# Patient Record
Sex: MALE | Race: WHITE | NOT HISPANIC OR LATINO | Employment: OTHER | URBAN - NONMETROPOLITAN AREA
[De-identification: names, ages, dates, MRNs, and addresses within clinical notes are randomized per-mention and may not be internally consistent; named-entity substitution may affect disease eponyms.]

---

## 2017-02-06 ENCOUNTER — COMMUNICATION - GICH (OUTPATIENT)
Dept: FAMILY MEDICINE | Facility: OTHER | Age: 67
End: 2017-02-06

## 2017-02-06 DIAGNOSIS — R32 URINARY INCONTINENCE: ICD-10-CM

## 2017-02-06 DIAGNOSIS — I10 ESSENTIAL (PRIMARY) HYPERTENSION: ICD-10-CM

## 2017-02-20 ENCOUNTER — AMBULATORY - GICH (OUTPATIENT)
Dept: LAB | Facility: OTHER | Age: 67
End: 2017-02-20

## 2017-02-20 ENCOUNTER — HISTORY (OUTPATIENT)
Dept: FAMILY MEDICINE | Facility: OTHER | Age: 67
End: 2017-02-20

## 2017-02-20 ENCOUNTER — OFFICE VISIT - GICH (OUTPATIENT)
Dept: FAMILY MEDICINE | Facility: OTHER | Age: 67
End: 2017-02-20

## 2017-02-20 DIAGNOSIS — R32 URINARY INCONTINENCE: ICD-10-CM

## 2017-02-20 DIAGNOSIS — A09 INFECTIOUS GASTROENTERITIS AND COLITIS: ICD-10-CM

## 2017-02-20 DIAGNOSIS — I10 ESSENTIAL (PRIMARY) HYPERTENSION: ICD-10-CM

## 2017-02-21 LAB
CAMPYLOBACTER EIA - HISTORICAL: NEGATIVE
SHIGA TOXIN 1 - HISTORICAL: NEGATIVE
SHIGA TOXIN 2 - HISTORICAL: NEGATIVE

## 2017-02-22 LAB — CULTURE - HISTORICAL: NORMAL

## 2017-05-20 ENCOUNTER — OFFICE VISIT - GICH (OUTPATIENT)
Dept: FAMILY MEDICINE | Facility: OTHER | Age: 67
End: 2017-05-20

## 2017-05-20 ENCOUNTER — HISTORY (OUTPATIENT)
Dept: FAMILY MEDICINE | Facility: OTHER | Age: 67
End: 2017-05-20

## 2017-05-20 DIAGNOSIS — B97.89 OTHER VIRAL AGENTS AS THE CAUSE OF DISEASES CLASSIFIED ELSEWHERE: ICD-10-CM

## 2017-05-20 DIAGNOSIS — J06.9 ACUTE UPPER RESPIRATORY INFECTION: ICD-10-CM

## 2017-05-20 DIAGNOSIS — J02.9 ACUTE PHARYNGITIS: ICD-10-CM

## 2017-05-20 LAB — STREP A ANTIGEN - HISTORICAL: NEGATIVE

## 2017-08-29 ENCOUNTER — HISTORY (OUTPATIENT)
Dept: EMERGENCY MEDICINE | Facility: OTHER | Age: 67
End: 2017-08-29

## 2017-08-29 ENCOUNTER — COMMUNICATION - GICH (OUTPATIENT)
Dept: FAMILY MEDICINE | Facility: OTHER | Age: 67
End: 2017-08-29

## 2017-09-07 ENCOUNTER — OFFICE VISIT - GICH (OUTPATIENT)
Dept: FAMILY MEDICINE | Facility: OTHER | Age: 67
End: 2017-09-07

## 2017-09-07 ENCOUNTER — HISTORY (OUTPATIENT)
Dept: FAMILY MEDICINE | Facility: OTHER | Age: 67
End: 2017-09-07

## 2017-09-07 DIAGNOSIS — R42 DIZZINESS AND GIDDINESS: ICD-10-CM

## 2017-09-07 ASSESSMENT — PATIENT HEALTH QUESTIONNAIRE - PHQ9: SUM OF ALL RESPONSES TO PHQ QUESTIONS 1-9: 0

## 2017-11-08 ENCOUNTER — AMBULATORY - GICH (OUTPATIENT)
Dept: FAMILY MEDICINE | Facility: OTHER | Age: 67
End: 2017-11-08

## 2017-11-08 DIAGNOSIS — Z23 ENCOUNTER FOR IMMUNIZATION: ICD-10-CM

## 2017-12-27 NOTE — PROGRESS NOTES
Patient Information     Patient Name MRN Sex     Jarad Purvis [id number] Male 1950      Progress Notes by Neal Enriquez MD at 2017 10:15 AM     Author:  Neal Enriquez MD Service:  (none) Author Type:  Physician     Filed:  2017  1:26 PM Encounter Date:  2017 Status:  Signed     :  Neal Enriquez MD (Physician)            SUBJECTIVE:    Jarad Purvis is a 67 y.o. male who presents for follow-up dizziness and burping    HPI Comments: Patient arrives here for follow-up dizziness and burping. He also been having gas E chest pain. States when he gets fairly humid he has shortness of breath but that only occurs with exercise during high humidity days. Otherwise he has no other complaints. He is doing fairly well. Recently seen in the ER and he had negative troponins. Dizziness was unclear. He's had no further episodes of the dizziness. No history of palpitations. No feelings of passing out. Just a lightheadedness.      Allergies     Allergen  Reactions     Phenobarbital Hives   ,   Family History       Problem   Relation Age of Onset     Cancer-breast  Mother      Heart Disease  Father 74     MI       Hypertension  Brother      Arthritis  Brother      Rheumatoid        Diabetes  No Family History     and   Current Outpatient Prescriptions on File Prior to Visit       Medication  Sig Dispense Refill     CPAP CPAP, heated humidifier, mask, headgear, filters and tubing. For home use. Pressure: ?  Length of Need: lifetime 1 unit 0     hydroCHLOROthiazide (HCTZ) 25 mg tablet Take 1 tablet by mouth once daily. 90 tablet 3     multivitamins with minerals (OCUVITE; OPTIGEN) tablet Take 1 tablet by mouth once daily.       POTASSIUM ORAL Take  by mouth.       propranolol (INDERAL) 40 mg tablet Take 1 tablet by mouth 2 times daily. 180 tablet 3     tamsulosin (FLOMAX) 0.4 mg capsule Take 1 capsule by mouth once daily after a meal. 90 capsule 3     No current facility-administered medications  on file prior to visit.        REVIEW OF SYSTEMS:  ROS    OBJECTIVE:  /84  Pulse 56  Wt 120.2 kg (265 lb)  BMI 35.94 kg/m2    EXAM:   Physical Exam   Constitutional: He is well-developed, well-nourished, and in no distress.   Obese   HENT:   Head: Normocephalic.   Right Ear: External ear normal.   Left Ear: External ear normal.   Mouth/Throat: No oropharyngeal exudate.   Eyes: Pupils are equal, round, and reactive to light.   Cardiovascular: Normal rate, regular rhythm and normal heart sounds.    No murmur heard.  Pulmonary/Chest: Effort normal and breath sounds normal. No respiratory distress.   Abdominal: Soft.   Musculoskeletal: Normal range of motion.   Neurological: He is alert.   Skin: Skin is warm.       ASSESSMENT/PLAN:    ICD-10-CM    1. Dizziness R42         Plan:  1 episode. Negative troponins. Not consistent with cardiac in nature. Recommended observation for now. If symptoms should worsen would consider cardiac echo.

## 2017-12-28 NOTE — TELEPHONE ENCOUNTER
"Patient Information     Patient Name MRN Jarad Barriga [id number] Male 1950      Telephone Encounter by Crystal Johnson RN at 2017  1:01 PM     Author:  Crystal Johnson RN Service:  (none) Author Type:  NURS- Registered Nurse     Filed:  2017  1:19 PM Encounter Date:  2017 Status:  Signed     :  Crystal Johnson RN (NURS- Registered Nurse)            Patient presented to [facility] with complaints of dizziness, shortness of breath and \"something with my arm.\" Patient was brought back for triage by this writer. He shares that these symptoms were accompanied by left-sided numbness/weakness, and blurred vision. Symptoms occurred when he was down for a nap after lunch. He waited for the symptoms to subside and then came to the clinic.  Due to complaints of one-sided weakness and numbness that have not totally resolved, patient was taken to the ED via wheelchair by this writer and clinic LPN.    Reason for Disposition    [1] Numbness (i.e., loss of sensation) of the face, arm / hand, or leg / foot on one side of the body AND [2] sudden onset AND [3] brief (now gone)    Answer Assessment - Initial Assessment Questions  1. SYMPTOM: \"What is the main symptom you are concerned about?\" (e.g., weakness, numbness)      Left-sided weakness/numbess  2. ONSET: \"When did this start?\" (minutes, hours, days; while sleeping)      While sleeping, 1-2 hours ago  3. LAST NORMAL: \"When was the last time you were normal (no symptoms)?\"      Has been sweating a lot the past few days, but otherwise normal unitl this episode  4. PATTERN \"Does this come and go, or has it been constant since it started?\"  \"Is it present now?\"      Has only happened once, mostly resolved at this time  5. CARDIAC SYMPTOMS: \"Have you had any of the following symptoms: chest pain, difficulty breathing, palpitations?\"      Difficulty breathing  6. NEUROLOGIC SYMPTOMS: \"Have you had any of the following symptoms: " "headache, dizziness, vision loss, double vision, changes in speech, unsteady on your feet?\"      Dizzy, blurred vision  7. OTHER SYMPTOMS: \"Do you have any other symptoms?\"      no  8. PREGNANCY: \"Is there any chance you are pregnant?\" \"When was your last menstrual period?\"      N/a male    Protocols used: ADULT NEUROLOGIC DEFICIT-A-AH            "

## 2017-12-30 NOTE — NURSING NOTE
Patient Information     Patient Name MRN Sex Jarad Love [id number] Male 1950      Nursing Note by Devi Vivar at 2017 10:15 AM     Author:  Devi Vivar Service:  (none) Author Type:  (none)     Filed:  2017 10:33 AM Encounter Date:  2017 Status:  Signed     :  Devi Vivar            Patient here for ED follow up for heart. He is feeling SOB with exercise and noticing belching and gassy. Devi Vivar LPN .......................2017  10:29 AM

## 2018-01-03 NOTE — PROGRESS NOTES
Patient Information     Patient Name MRN Sex Jarad Love [id number] Male 1950      Progress Notes by Anahy Contreras at 2017  2:55 PM     Author:  Anahy Contreras Service:  (none) Author Type:  (none)     Filed:  2017  3:24 PM Encounter Date:  2017 Status:  Signed     :  Anahy Contreras            Patient called back and was notified of the below information.  Anahy Contreras LPN  2017  3:24 PM

## 2018-01-03 NOTE — TELEPHONE ENCOUNTER
Patient Information     Patient Name MRN Sex Jarad Love [id number] Male 1950      Telephone Encounter by Crystal Johnson RN at 2017  2:31 PM     Author:  Crystal Johnson RN Service:  (none) Author Type:  NURS- Registered Nurse     Filed:  2017  2:35 PM Encounter Date:  2017 Status:  Signed     :  Crystal Johnson RN (NURS- Registered Nurse)            [facility]    Office visit in the past 12 months or per provider note.    Last visit with HARRY GALLARDO was on: 2016 in [facility]  Next visit with PAULINA HARRY SPARKS is on: 2017 in [facility]  Next visit with [facility] is on: 2017 in [facility]    Max refill for 12 months from last office visit or per provider note.    Diuretics (may be prescribed for edema)    Office visit in the past 12 months or per provider note.    Lab testing requirements:  Creatinine and Potassium annually, if ordering lab, order BMP.  CREATININE (mg/dL)    Date Value   2016 0.93     POTASSIUM (mmol/L)    Date Value   2016 3.8     BP Readings from Last 4 Encounters:    16 115/80   16 118/78   16 124/68   16 120/80         Review last provider visit note.  If BP reviewed and plan is noted, can refill.  Max refill for 12 months from last office visit or per provider note.    Prophylactic Therapy for Headache/Migraine    Office visit in the past 12 months or per provider note.    Max refill for 12 months from last office visit or per provider note.    Patient is due for medication management appointment. Limited refill provided at this time and noted upcoming appointment. Prescription refilled per RN Medication Refill Policy.................... Crystal Johnson RN ....................  2017   2:32 PM

## 2018-01-03 NOTE — NURSING NOTE
Patient Information     Patient Name MRN Sex Jarad Love [id number] Male 1950      Nursing Note by Devi Vivar at 2017  9:00 AM     Author:  Devi Vivar Service:  (none) Author Type:  (none)     Filed:  2017  9:07 AM Encounter Date:  2017 Status:  Signed     :  Devi Vivar            Patient here for refills on his medications, he returned [place] on Saturday and has a stomach upset going on, loose stools and yellow liquid. Devi Vivar LPN .......................2017  9:07 AM

## 2018-01-03 NOTE — PROGRESS NOTES
Patient Information     Patient Name MRN Sex     Jarad Purvis [id number] Male 1950      Progress Notes by Neal Enriquez MD at 2017  9:00 AM     Author:  Neal Enriquez MD Service:  (none) Author Type:  Physician     Filed:  2017 10:01 AM Encounter Date:  2017 Status:  Signed     :  Neal Enriquez MD (Physician)            SUBJECTIVE:    Jarad Purvis is a 66 y.o. male who presents for meducation refil loose stools. An abdominal cramping    HPI Comments: Patient arrives here for medication refill. He is in need of Flomax and his blood pressure medication. He is tolerating it well. He also reports cramping. He and his wife recently completed a cruise down in [place]. He developed 3-4 days of red to yellow diarrhea stools. Increasing bloating increasing gas. Denies any antibiotic use. Sounds like he had does have a history of C. difficile. He reports also cramping at times. No nausea or vomiting.      Allergies     Allergen  Reactions     Phenobarbital Hives   ,   Family History       Problem   Relation Age of Onset     Heart Disease  Father 74     MI       Cancer-breast  Mother      Hypertension  Brother      Arthritis  Brother      Rheumatoid        Diabetes  No Family History    ,   Current Outpatient Prescriptions on File Prior to Visit       Medication  Sig Dispense Refill     CPAP CPAP, heated humidifier, mask, headgear, filters and tubing. For home use. Pressure: ?  Length of Need: lifetime 1 unit 0     docusate (COLACE) 100 mg capsule Take 100 mg by mouth once daily.       multivitamins with minerals (OCUVITE; OPTIGEN) tablet Take 1 tablet by mouth once daily.       POTASSIUM ORAL Take  by mouth.       No current facility-administered medications on file prior to visit.    ,   Past Surgical History       Procedure   Laterality Date     Pr lap ventral incisional herniorraphy        with mesh       Pr fusion of wrist joint        Nissen fundoplication         Cholecystectomy        Cataract removal        rt       Esophagogastroduodenoscopy        1998       Bone marrow aspiration        Colonoscopy screening        2010     ,   Social History       Substance Use Topics         Smoking status:  Former Smoker      Quit date: 10/8/1985      Smokeless tobacco:  Former User      Alcohol use  4.2 oz/week     7 Standard drinks or equivalent per week     and   Social History       Substance Use Topics         Smoking status:  Former Smoker      Quit date: 10/8/1985      Smokeless tobacco:  Former User      Alcohol use  4.2 oz/week     7 Standard drinks or equivalent per week        REVIEW OF SYSTEMS:  ROS    OBJECTIVE:  /78  Pulse 60  Wt 122.1 kg (269 lb 3.2 oz)  BMI 36.51 kg/m2    EXAM:   Physical Exam   Constitutional: He is well-developed, well-nourished, and in no distress.   HENT:   Head: Normocephalic and atraumatic.   Right Ear: External ear normal.   Left Ear: External ear normal.   Eyes: Pupils are equal, round, and reactive to light.   Cardiovascular: Normal rate, regular rhythm and normal heart sounds.    No murmur heard.  Pulmonary/Chest: Effort normal and breath sounds normal. No respiratory distress.   Abdominal: Soft. He exhibits no distension and no mass. There is no tenderness. There is no rebound and no guarding.   Increased bowel sounds   Musculoskeletal: Normal range of motion.   Neurological: He is alert.   Skin: Skin is warm.       ASSESSMENT/PLAN:    ICD-10-CM    1. Essential hypertension I10 hydroCHLOROthiazide (HCTZ) 25 mg tablet      propranolol (INDERAL) 40 mg tablet      tamsulosin (FLOMAX) 0.4 mg capsule   2. Incontinence R32 tamsulosin (FLOMAX) 0.4 mg capsule   3. Diarrhea of presumed infectious origin A09 STOOL CULTURE PANEL      CLOSTRIDIUM DIFFICILE TOXIN PCR        Plan:  Blood pressure currently under good control.  With his recent cruise one worries about infectious diarrhea. We'll proceed with a stool culture and C. difficile. Flomax  is refilled for urinary incontinence. Also reports that's working well. Discussed with the patient if he should worsen especially developing fevers and chills he needs to return for reevaluation.

## 2018-01-05 NOTE — PATIENT INSTRUCTIONS
Patient Information     Patient Name MRN Sex Jarad Love [id number] Male 1950      Patient Instructions by Stella Diaz NP at 2017  9:00 AM     Author:  Stella Diaz NP  Service:  (none) Author Type:  PHYS- Nurse Practitioner     Filed:  2017  9:54 AM  Encounter Date:  2017 Status:  Addendum     :  Stella Diaz NP (PHYS- Nurse Practitioner)        Related Notes: Original Note by Stella Diaz NP (PHYS- Nurse Practitioner) filed at 2017  9:53 AM            Symptoms likely due to virus. No antibiotic is needed at this time.       Most coughs are caused by a viral infection.   Usually coughs can last 2 to 3 weeks. Sometimes the cough becomes loose (wet) for a few days, and your child coughs up a lot of phlegm (mucus). This is usually a sign that the end of the illness is near.    Most sore throats are caused by viruses and are part of a cold. About 10% of sore throats are caused by strep bacteria.    May use Mucinex as needed for congestion    Encouraged fluids and rest.    May use symptomatic care with tylenol or ibuprofen.     Using a humidifier works well to break up the congestion.     Elevate the mattress to 15 degrees in order to help with the congestion.    Frequent swallows of cool liquid.      Oatmeal or honey coats the throat and some patients find it soothes the pain.     Salt water gargles as needed    Return to clinic with change/worsening of symptoms or concerns.

## 2018-01-05 NOTE — NURSING NOTE
Patient Information     Patient Name MRN Sex Jarad Love [id number] Male 1950      Nursing Note by Catie Jackson at 2017  9:00 AM     Author:  Catie Jackson Service:  (none) Author Type:  (none)     Filed:  2017  9:22 AM Encounter Date:  2017 Status:  Signed     :  Catie Jackson            Patient presents to clinic with cough and sore throat. He has had a productive cough of brownish/vasquez phylum.  Catie Baltazar ....................  2017   9:15 AM

## 2018-01-05 NOTE — PROGRESS NOTES
Patient Information     Patient Name MRN Sex     Jarad Purvis [id number] Male 1950      Progress Notes by Stella Diaz NP at 2017  9:00 AM     Author:  Stella Diaz NP Service:  (none) Author Type:  PHYS- Nurse Practitioner     Filed:  2017 10:10 AM Encounter Date:  2017 Status:  Signed     :  Stella Diaz NP (PHYS- Nurse Practitioner)            HPI:    Jarad Purvis is a 66 y.o. male who presents to clinic today for URI.  Sore throat and stuffy nose for a couple of days.  Throat is irritated and sore but not severe.  Intermittent cough started yesterday.  Coughing up phlegm during the night.  No chest tightness.  Feeling winded at times.  No fevers.  Feeling warm.  No chills.  Headache this morning.  Body aches and weakness initially, resolved.  Decreased energy, low, fatigued.  Appetite normal, no change.  Taking Dayquil and Nyquil for a few days.            No past medical history on file.  Past Surgical History:      Procedure  Laterality Date     BONE MARROW ASPIRATION       CATARACT REMOVAL      rt       CHOLECYSTECTOMY       COLONOSCOPY SCREENING             ESOPHAGOGASTRODUODENOSCOPY             nissen fundoplication       MA FUSION OF WRIST JOINT       MA LAP VENTRAL INCISIONAL HERNIORRAPHY      with mesh       Social History       Substance Use Topics         Smoking status:  Former Smoker      Quit date: 10/8/1985      Smokeless tobacco:  Former User      Alcohol use  4.2 oz/week     7 Standard drinks or equivalent per week      Current Outpatient Prescriptions       Medication  Sig Dispense Refill     CPAP CPAP, heated humidifier, mask, headgear, filters and tubing. For home use. Pressure: ?  Length of Need: lifetime 1 unit 0     docusate (COLACE) 100 mg capsule Take 100 mg by mouth once daily.       hydroCHLOROthiazide (HCTZ) 25 mg tablet Take 1 tablet by mouth once daily. 90 tablet 3     multivitamins with minerals (OCUVITE; OPTIGEN) tablet  Take 1 tablet by mouth once daily.       POTASSIUM ORAL Take  by mouth.       propranolol (INDERAL) 40 mg tablet Take 1 tablet by mouth 2 times daily. 180 tablet 3     tamsulosin (FLOMAX) 0.4 mg capsule Take 1 capsule by mouth once daily after a meal. 90 capsule 3     No current facility-administered medications for this visit.      Medications have been reviewed by me and are current to the best of my knowledge and ability.    Allergies     Allergen  Reactions     Phenobarbital Hives       Past medical history, past surgical history, current medications and allergies reviewed and accurate to the best of my knowledge.        ROS:  Refer to HPI    /78  Pulse 80  Temp 97.5  F (36.4  C) (Tympanic)   Resp 20  Ht 1.829 m (6')  Wt 123 kg (271 lb 3.2 oz)  BMI 36.78 kg/m2    EXAM:  General Appearance: Well appearing adult male, appropriate appearance for age. No acute distress  Head: normocephalic, atraumatic  Ears: Left TM with bony landmarks appreciated, no erythema, no effusion, no bulging, no purulence.  Right TM with bony landmarks appreciated, no erythema, no effusion, no bulging, no purulence.   Left auditory canal clear.  Right auditory canal clear.  Normal external ears, non tender.  Eyes: conjunctivae normal, no drainage  Orophayrnx: moist mucous membranes, posterior pharynx with bright erythema, tonsils with 1-2+ hypertrophy, bright erythema, no exudates or petechiae, no post nasal drip seen.    Nose:  Bilateral nares with erythema and mild congestion   Neck: supple without adenopathy  Respiratory: normal chest wall and respirations.  Normal effort.  Clear to auscultation bilaterally, no wheezing, crackles or rhonchi.  No increased work of breathing.  Occasional mild cough appreciated  Cardiac: RRR with no murmurs  Psychological: normal affect, alert and pleasant      Labs:  Results for orders placed or performed in visit on 05/20/17      RAPID STREP WITH REFLEX CULTURE      Result  Value Ref Range     STREP A ANTIGEN           Negative Negative           ASSESSMENT/PLAN:    ICD-10-CM    1. Sore throat J02.9 RAPID STREP WITH REFLEX CULTURE      RAPID STREP WITH REFLEX CULTURE   2. Viral pharyngitis J02.9    3. Viral URI with cough J06.9      B97.89          Negative rapid strep test  Symptoms and exam consistent with viral illness.  No antibiotics indicated at this time.  Mucinex PRN.  Avoid cold medications.    Encouraged fluids  Symptomatic treatment - salt water gargles, honey, elevation, humidifier, sinus rinse/netti pot, lozenges, etc   Tylenol or ibuprofen PRN  Follow up if symptoms persist or worsen or concerns        Patient Instructions   Symptoms likely due to virus. No antibiotic is needed at this time.       Most coughs are caused by a viral infection.   Usually coughs can last 2 to 3 weeks. Sometimes the cough becomes loose (wet) for a few days, and your child coughs up a lot of phlegm (mucus). This is usually a sign that the end of the illness is near.    Most sore throats are caused by viruses and are part of a cold. About 10% of sore throats are caused by strep bacteria.    May use Mucinex as needed for congestion    Encouraged fluids and rest.    May use symptomatic care with tylenol or ibuprofen.     Using a humidifier works well to break up the congestion.     Elevate the mattress to 15 degrees in order to help with the congestion.    Frequent swallows of cool liquid.      Oatmeal or honey coats the throat and some patients find it soothes the pain.     Salt water gargles as needed    Return to clinic with change/worsening of symptoms or concerns.

## 2018-01-25 ENCOUNTER — DOCUMENTATION ONLY (OUTPATIENT)
Dept: FAMILY MEDICINE | Facility: OTHER | Age: 68
End: 2018-01-25

## 2018-01-25 PROBLEM — I10 HYPERTENSION: Status: ACTIVE | Noted: 2018-01-25

## 2018-01-25 PROBLEM — D69.49 PRIMARY THROMBOCYTOPENIA (H): Status: ACTIVE | Noted: 2018-01-25

## 2018-01-25 PROBLEM — G47.30 SLEEP APNEA: Status: ACTIVE | Noted: 2018-01-25

## 2018-01-25 PROBLEM — G80.9 CEREBRAL PALSY (H): Status: ACTIVE | Noted: 2018-01-25

## 2018-01-25 PROBLEM — K44.9 HIATAL HERNIA: Status: ACTIVE | Noted: 2018-01-25

## 2018-01-25 PROBLEM — K57.30 DIVERTICULAR DISEASE OF COLON: Status: ACTIVE | Noted: 2018-01-25

## 2018-01-25 PROBLEM — R56.9 CONVULSIONS (H): Status: ACTIVE | Noted: 2018-01-25

## 2018-01-25 RX ORDER — HYDROCHLOROTHIAZIDE 25 MG/1
25 TABLET ORAL DAILY
COMMUNITY
Start: 2017-02-20 | End: 2018-03-19

## 2018-01-25 RX ORDER — PROPRANOLOL HYDROCHLORIDE 40 MG/1
40 TABLET ORAL 2 TIMES DAILY
COMMUNITY
Start: 2017-02-20 | End: 2018-03-19

## 2018-01-25 RX ORDER — TAMSULOSIN HYDROCHLORIDE 0.4 MG/1
0.4 CAPSULE ORAL DAILY
COMMUNITY
Start: 2017-02-20 | End: 2018-03-19

## 2018-01-25 RX ORDER — MULTIVIT-MIN/IRON FUM/FOLIC AC 7.5 MG-4
1 TABLET ORAL DAILY
COMMUNITY
Start: 2016-05-19

## 2018-01-27 VITALS
DIASTOLIC BLOOD PRESSURE: 78 MMHG | RESPIRATION RATE: 20 BRPM | WEIGHT: 271.2 LBS | SYSTOLIC BLOOD PRESSURE: 128 MMHG | HEART RATE: 80 BPM | HEIGHT: 72 IN | BODY MASS INDEX: 36.73 KG/M2 | TEMPERATURE: 97.5 F

## 2018-01-27 VITALS
SYSTOLIC BLOOD PRESSURE: 124 MMHG | DIASTOLIC BLOOD PRESSURE: 78 MMHG | WEIGHT: 269.2 LBS | HEART RATE: 60 BPM | BODY MASS INDEX: 36.51 KG/M2

## 2018-01-27 VITALS
DIASTOLIC BLOOD PRESSURE: 84 MMHG | HEART RATE: 56 BPM | BODY MASS INDEX: 35.94 KG/M2 | WEIGHT: 265 LBS | SYSTOLIC BLOOD PRESSURE: 122 MMHG

## 2018-02-02 ASSESSMENT — PATIENT HEALTH QUESTIONNAIRE - PHQ9: SUM OF ALL RESPONSES TO PHQ QUESTIONS 1-9: 0

## 2018-03-19 ENCOUNTER — OFFICE VISIT (OUTPATIENT)
Dept: FAMILY MEDICINE | Facility: OTHER | Age: 68
End: 2018-03-19
Attending: FAMILY MEDICINE
Payer: COMMERCIAL

## 2018-03-19 VITALS
WEIGHT: 262.6 LBS | SYSTOLIC BLOOD PRESSURE: 134 MMHG | BODY MASS INDEX: 35.61 KG/M2 | HEART RATE: 52 BPM | DIASTOLIC BLOOD PRESSURE: 82 MMHG

## 2018-03-19 DIAGNOSIS — I10 ESSENTIAL HYPERTENSION: Primary | ICD-10-CM

## 2018-03-19 DIAGNOSIS — R73.9 ELEVATED BLOOD SUGAR: ICD-10-CM

## 2018-03-19 DIAGNOSIS — N39.490 OVERFLOW INCONTINENCE OF URINE: ICD-10-CM

## 2018-03-19 PROCEDURE — 99213 OFFICE O/P EST LOW 20 MIN: CPT | Performed by: FAMILY MEDICINE

## 2018-03-19 PROCEDURE — G0463 HOSPITAL OUTPT CLINIC VISIT: HCPCS

## 2018-03-19 RX ORDER — HYDROCHLOROTHIAZIDE 25 MG/1
25 TABLET ORAL DAILY
Qty: 90 TABLET | Refills: 3 | Status: SHIPPED | OUTPATIENT
Start: 2018-03-19 | End: 2019-03-08

## 2018-03-19 RX ORDER — TAMSULOSIN HYDROCHLORIDE 0.4 MG/1
0.4 CAPSULE ORAL DAILY
Qty: 90 CAPSULE | Refills: 3 | Status: SHIPPED | OUTPATIENT
Start: 2018-03-19 | End: 2019-03-08

## 2018-03-19 RX ORDER — RIBOFLAVIN (VITAMIN B2) 100 MG
1 TABLET ORAL DAILY
COMMUNITY
End: 2022-04-12

## 2018-03-19 RX ORDER — PROPRANOLOL HYDROCHLORIDE 40 MG/1
40 TABLET ORAL 2 TIMES DAILY
Qty: 180 TABLET | Refills: 3 | Status: SHIPPED | OUTPATIENT
Start: 2018-03-19 | End: 2019-03-08

## 2018-03-19 ASSESSMENT — ANXIETY QUESTIONNAIRES
3. WORRYING TOO MUCH ABOUT DIFFERENT THINGS: NOT AT ALL
5. BEING SO RESTLESS THAT IT IS HARD TO SIT STILL: NOT AT ALL
6. BECOMING EASILY ANNOYED OR IRRITABLE: NOT AT ALL
2. NOT BEING ABLE TO STOP OR CONTROL WORRYING: NOT AT ALL
1. FEELING NERVOUS, ANXIOUS, OR ON EDGE: NOT AT ALL
7. FEELING AFRAID AS IF SOMETHING AWFUL MIGHT HAPPEN: NOT AT ALL
GAD7 TOTAL SCORE: 0

## 2018-03-19 ASSESSMENT — PAIN SCALES - GENERAL: PAINLEVEL: NO PAIN (0)

## 2018-03-19 ASSESSMENT — PATIENT HEALTH QUESTIONNAIRE - PHQ9: 5. POOR APPETITE OR OVEREATING: NOT AT ALL

## 2018-03-19 NOTE — NURSING NOTE
Patient here for medication refills and he has concerns with bloating, gas and belching. Devi Vivar LPN .......................3/19/2018  10:22 AM

## 2018-03-19 NOTE — MR AVS SNAPSHOT
"              After Visit Summary   3/19/2018    Jarad Purvis    MRN: [id number]           Patient Information     Date Of Birth          1950        Visit Information        Provider Department      3/19/2018 10:30 AM Neal Erniquez MD [facility]        Today's Diagnoses     Essential hypertension    -  1    Elevated blood sugar        Overflow incontinence of urine           Follow-ups after your visit        Who to contact     If you have questions or need follow up information about today's clinic visit or your schedule please contact [facility] directly at [phone].  Normal or non-critical lab and imaging results will be communicated to you by Timelinerhart, letter or phone within 4 business days after the clinic has received the results. If you do not hear from us within 7 days, please contact the clinic through [facility] or phone. If you have a critical or abnormal lab result, we will notify you by phone as soon as possible.  Submit refill requests through [employer] or call your pharmacy and they will forward the refill request to us. Please allow 3 business days for your refill to be completed.          Additional Information About Your Visit        MyChart Information     [employer] lets you send messages to your doctor, view your test results, renew your prescriptions, schedule appointments and more. To sign up, go to [email or web address] . Click on \"Log in\" on the left side of the screen, which will take you to the Welcome page. Then click on \"Sign up Now\" on the right side of the page.     You will be asked to enter the access code listed below, as well as some personal information. Please follow the directions to create your username and password.     Your access code is: [email or web address]  Expires: 2018 11:02 AM     Your access code will  in 90 days. If you need help or a new code, please call your [facility] clinic or [phone].        Care " EveryWhere ID     This is your Care EveryWhere ID. This could be used by other organizations to access your [facility] medical records  [id number]        Your Vitals Were     Pulse BMI (Body Mass Index)                52 35.61 kg/m2           Blood Pressure from Last 3 Encounters:   03/19/18 134/82   09/07/17 122/84   05/20/17 128/78    Weight from Last 3 Encounters:   03/19/18 262 lb 9.6 oz (119.1 kg)   09/07/17 265 lb (120.2 kg)   05/20/17 271 lb 3.2 oz (123 kg)              Today, you had the following     No orders found for display         Where to get your medicines      These medications were sent to [facility], - [place] - [address]  [address] [place] [address]     Phone:  [phone]     hydrochlorothiazide 25 MG tablet    propranolol 40 MG tablet    tamsulosin 0.4 MG capsule          Primary Care Provider Office Phone # Fax #    Neal Enriquez [phone]       [address] [facility] [address]        Equal Access to Services     [facility]: Hadii jasbir ku hadasho Soomaali, waaxda luqadaha, qaybta kaalmada adelatesha, luci avila . So [facility] [phone].    ATENCIÓN: Si habla español, tiene a tesfaye disposición servicios gratuitos de asistencia lingüística. [facility] [phone].    We comply with applicable federal civil rights laws and [place] laws. We do not discriminate on the basis of race, color, national origin, age, disability, sex, sexual orientation, or gender identity.            Thank you!     Thank you for choosing [facility] AND [facility]  for your care. Our goal is always to provide you with excellent care. Hearing back from our patients is one way we can continue to improve our services. Please take a few minutes to complete the written survey that you may receive in the mail after your visit with us. Thank you!             Your Updated Medication List - Protect others  around you: Learn how to safely use, store and throw away your medicines at www.disposemymeds.org.          This list is accurate as of 3/19/18 11:22 AM.  Always use your most recent med list.                   Brand Name Dispense Instructions for use Diagnosis    hydrochlorothiazide 25 MG tablet    HYDRODIURIL    90 tablet    Take 1 tablet (25 mg) by mouth daily    Essential hypertension       MULTI-VITAMIN/MINERALS Tabs      Take 1 tablet by mouth daily        potassium 99 MG Tabs           propranolol 40 MG tablet    INDERAL    180 tablet    Take 1 tablet (40 mg) by mouth 2 times daily    Essential hypertension       tamsulosin 0.4 MG capsule    FLOMAX    90 capsule    Take 1 capsule (0.4 mg) by mouth daily    Overflow incontinence of urine       Vitamin C 100 MG Tabs      Take 1 tablet by mouth daily

## 2018-03-19 NOTE — PROGRESS NOTES
SUBJECTIVE:   Jarad Purvis is a 67 year old male who presents to clinic today for the following health issues: Medication refill follow-up bloating    HPI Comments: Patient arrives here for medication refill.  He was last seen for belching and bloating.  He reports not any worse possibly a little bit improving.  He is attempting to adjust his diet.  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   Past Surgical History:   Procedure Laterality Date     CHOLECYSTECTOMY      No Comments Provided     COLONOSCOPY      2010     ESOPHAGOSCOPY, GASTROSCOPY, DUODENOSCOPY (EGD), COMBINED      1998     EXTRACAPSULAR CATARACT EXTRATION WITH INTRAOCULAR LENS IMPLANT      rt     OTHER SURGICAL HISTORY      24960.1,SD LAP VENTRAL INCISIONAL HERNIORRAPHY,with mesh     OTHER SURGICAL HISTORY      39865.0,SD FUSION OF WRIST JOINT     OTHER SURGICAL HISTORY      284821,OTHER     OTHER SURGICAL HISTORY      078720,BONE MARROW ASPIRATION        Review of Systems     OBJECTIVE:     /82 (BP Location: Left arm)  Pulse 52  Wt 262 lb 9.6 oz (119.1 kg)  BMI 35.61 kg/m2  Body mass index is 35.61 kg/(m^2).  Physical Exam   Constitutional: He appears well-developed.   Cardiovascular: Normal rate, regular rhythm and normal heart sounds.    No murmur heard.  Pulmonary/Chest: Effort normal and breath sounds normal.       none     ASSESSMENT/PLAN:         1. Elevated blood sugar  Patient recently had a chemistry panel which was satisfactory.    2. Essential hypertension  Currently under good control medications refilled.  - hydrochlorothiazide (HYDRODIURIL) 25 MG tablet; Take 1 tablet (25 mg) by mouth daily  Dispense: 90 tablet; Refill: 3  - propranolol (INDERAL) 40 MG tablet; Take 1 tablet (40 mg) by mouth 2 times daily  Dispense: 180 tablet; Refill: 3    3. Overflow incontinence of urine  Refill  - tamsulosin (FLOMAX) 0.4 MG capsule; Take 1 capsule (0.4 mg) by mouth daily  Dispense: 90 capsule; Refill: 3 also discussed his belching.    He can try simethicone or Gas-X.  Follow-up if worsening      Neal Enriquez MD  [facility] AND \[facility]\""

## 2018-03-20 ASSESSMENT — ANXIETY QUESTIONNAIRES: GAD7 TOTAL SCORE: 0

## 2018-03-20 ASSESSMENT — PATIENT HEALTH QUESTIONNAIRE - PHQ9: SUM OF ALL RESPONSES TO PHQ QUESTIONS 1-9: 0

## 2018-07-25 ENCOUNTER — TRANSFERRED RECORDS (OUTPATIENT)
Dept: HEALTH INFORMATION MANAGEMENT | Facility: OTHER | Age: 68
End: 2018-07-25

## 2018-10-09 ENCOUNTER — TRANSFERRED RECORDS (OUTPATIENT)
Dept: HEALTH INFORMATION MANAGEMENT | Facility: OTHER | Age: 68
End: 2018-10-09

## 2018-10-17 ENCOUNTER — ALLIED HEALTH/NURSE VISIT (OUTPATIENT)
Dept: FAMILY MEDICINE | Facility: OTHER | Age: 68
End: 2018-10-17
Attending: FAMILY MEDICINE
Payer: MEDICARE

## 2018-10-17 DIAGNOSIS — Z23 NEED FOR PROPHYLACTIC VACCINATION AND INOCULATION AGAINST INFLUENZA: Primary | ICD-10-CM

## 2018-10-17 PROCEDURE — 99207 ZZC NO CHARGE NURSE ONLY: CPT

## 2018-10-17 PROCEDURE — 90662 IIV NO PRSV INCREASED AG IM: CPT

## 2018-10-17 PROCEDURE — G0008 ADMIN INFLUENZA VIRUS VAC: HCPCS

## 2018-10-17 NOTE — MR AVS SNAPSHOT
After Visit Summary   10/17/2018    Jarad Purvis    MRN: [id number]           Patient Information     Date Of Birth          1950        Visit Information        Provider Department      10/17/2018 3:20 PM [facility] FLU [facility]        Today's Diagnoses     Need for prophylactic vaccination and inoculation against influenza    -  1       Follow-ups after your visit        Who to contact     If you have questions or need follow up information about today's clinic visit or your schedule please contact [facility] directly at [phone].  Normal or non-critical lab and imaging results will be communicated to you by MyChart, letter or phone within 4 business days after the clinic has received the results. If you do not hear from us within 7 days, please contact the clinic through MyChart or phone. If you have a critical or abnormal lab result, we will notify you by phone as soon as possible.  Submit refill requests through [employer] or call your pharmacy and they will forward the refill request to us. Please allow 3 business days for your refill to be completed.          Additional Information About Your Visit        Care EveryWhere ID     This is your Care EveryWhere ID. This could be used by other organizations to access your [place] medical records  [id number]         Blood Pressure from Last 3 Encounters:   03/19/18 134/82   09/07/17 122/84   05/20/17 128/78    Weight from Last 3 Encounters:   03/19/18 262 lb 9.6 oz (119.1 kg)   09/07/17 265 lb (120.2 kg)   05/20/17 271 lb 3.2 oz (123 kg)              We Performed the Following     HC FLU VACCINE, INCREASED ANTIGEN, PRESV FREE     Vaccine Administration, Initial [32670]        Primary Care Provider Office Phone # Fax #    Neal Enriquez [phone]       [address] [facility] [address]        Equal Access to Services     TULIO WISE AH: rashida Stuart  vincent osbornaishwaryamarilin maldonadoluci charlton. So [facility] [phone].    ATENCIÓN: Si sotero mike, tiene a tesfaye disposición servicios gratuitos de asistencia lingüística. Llame al [phone].    We comply with applicable federal civil rights laws and [place] laws. We do not discriminate on the basis of race, color, national origin, age, disability, sex, sexual orientation, or gender identity.            Thank you!     Thank you for choosing [facility] AND [facility]  for your care. Our goal is always to provide you with excellent care. Hearing back from our patients is one way we can continue to improve our services. Please take a few minutes to complete the written survey that you may receive in the mail after your visit with us. Thank you!             Your Updated Medication List - Protect others around you: Learn how to safely use, store and throw away your medicines at www.disposemymeds.org.          This list is accurate as of 10/17/18  3:31 PM.  Always use your most recent med list.                   Brand Name Dispense Instructions for use Diagnosis    hydrochlorothiazide 25 MG tablet    HYDRODIURIL    90 tablet    Take 1 tablet (25 mg) by mouth daily    Essential hypertension       MULTI-VITAMIN/MINERALS Tabs      Take 1 tablet by mouth daily        potassium 99 MG Tabs           propranolol 40 MG tablet    INDERAL    180 tablet    Take 1 tablet (40 mg) by mouth 2 times daily    Essential hypertension       tamsulosin 0.4 MG capsule    FLOMAX    90 capsule    Take 1 capsule (0.4 mg) by mouth daily    Overflow incontinence of urine       Vitamin C 100 MG Tabs      Take 1 tablet by mouth daily

## 2018-10-17 NOTE — PROGRESS NOTES

## 2019-02-26 ENCOUNTER — TELEPHONE (OUTPATIENT)
Dept: FAMILY MEDICINE | Facility: OTHER | Age: 69
End: 2019-02-26

## 2019-02-28 ENCOUNTER — TELEPHONE (OUTPATIENT)
Dept: FAMILY MEDICINE | Facility: OTHER | Age: 69
End: 2019-02-28

## 2019-02-28 NOTE — TELEPHONE ENCOUNTER
After verifying pts name and date of birth with pt, pt notified of Dr. Enriquez's message below. Pt was then transferred to the appointment line to make an appointment.  Joanna Alanis

## 2019-03-08 ENCOUNTER — OFFICE VISIT (OUTPATIENT)
Dept: FAMILY MEDICINE | Facility: OTHER | Age: 69
End: 2019-03-08
Attending: FAMILY MEDICINE
Payer: MEDICARE

## 2019-03-08 VITALS
HEART RATE: 64 BPM | WEIGHT: 265 LBS | DIASTOLIC BLOOD PRESSURE: 68 MMHG | BODY MASS INDEX: 35.89 KG/M2 | SYSTOLIC BLOOD PRESSURE: 116 MMHG | TEMPERATURE: 97.6 F | HEIGHT: 72 IN | RESPIRATION RATE: 20 BRPM

## 2019-03-08 DIAGNOSIS — G47.33 OBSTRUCTIVE SLEEP APNEA SYNDROME: ICD-10-CM

## 2019-03-08 DIAGNOSIS — I10 ESSENTIAL HYPERTENSION: Primary | ICD-10-CM

## 2019-03-08 DIAGNOSIS — N39.490 OVERFLOW INCONTINENCE OF URINE: ICD-10-CM

## 2019-03-08 LAB
ANION GAP SERPL CALCULATED.3IONS-SCNC: 9 MMOL/L (ref 3–14)
BUN SERPL-MCNC: 18 MG/DL (ref 7–25)
CALCIUM SERPL-MCNC: 9.5 MG/DL (ref 8.6–10.3)
CHLORIDE SERPL-SCNC: 102 MMOL/L (ref 98–107)
CO2 SERPL-SCNC: 25 MMOL/L (ref 21–31)
CREAT SERPL-MCNC: 0.81 MG/DL (ref 0.7–1.3)
GFR SERPL CREATININE-BSD FRML MDRD: >90 ML/MIN/{1.73_M2}
GLUCOSE SERPL-MCNC: 107 MG/DL (ref 70–105)
POTASSIUM SERPL-SCNC: 4 MMOL/L (ref 3.5–5.1)
SODIUM SERPL-SCNC: 136 MMOL/L (ref 134–144)

## 2019-03-08 PROCEDURE — 80048 BASIC METABOLIC PNL TOTAL CA: CPT | Performed by: FAMILY MEDICINE

## 2019-03-08 PROCEDURE — G0463 HOSPITAL OUTPT CLINIC VISIT: HCPCS

## 2019-03-08 PROCEDURE — 99214 OFFICE O/P EST MOD 30 MIN: CPT | Performed by: FAMILY MEDICINE

## 2019-03-08 PROCEDURE — 36415 COLL VENOUS BLD VENIPUNCTURE: CPT | Performed by: FAMILY MEDICINE

## 2019-03-08 RX ORDER — SODIUM FLUORIDE 6.1 MG/ML
GEL, DENTIFRICE DENTAL
Refills: 5 | COMMUNITY
Start: 2018-03-21 | End: 2019-05-20

## 2019-03-08 RX ORDER — PROPRANOLOL HYDROCHLORIDE 40 MG/1
40 TABLET ORAL 2 TIMES DAILY
Qty: 180 TABLET | Refills: 3 | Status: SHIPPED | OUTPATIENT
Start: 2019-03-08 | End: 2020-03-30

## 2019-03-08 RX ORDER — TAMSULOSIN HYDROCHLORIDE 0.4 MG/1
0.4 CAPSULE ORAL DAILY
Qty: 90 CAPSULE | Refills: 3 | Status: SHIPPED | OUTPATIENT
Start: 2019-03-08 | End: 2020-03-30

## 2019-03-08 RX ORDER — HYDROCHLOROTHIAZIDE 25 MG/1
25 TABLET ORAL DAILY
Qty: 90 TABLET | Refills: 3 | Status: SHIPPED | OUTPATIENT
Start: 2019-03-08 | End: 2020-03-30

## 2019-03-08 ASSESSMENT — PATIENT HEALTH QUESTIONNAIRE - PHQ9: SUM OF ALL RESPONSES TO PHQ QUESTIONS 1-9: 0

## 2019-03-08 ASSESSMENT — MIFFLIN-ST. JEOR: SCORE: 2000.03

## 2019-03-08 ASSESSMENT — PAIN SCALES - GENERAL: PAINLEVEL: NO PAIN (0)

## 2019-03-08 NOTE — NURSING NOTE
Patient here for medication refills and would like a written script for CPAP supplies to try with Zackary Garcia. He was going through [employer] which he says was a nightmare, patent was extremely dissatisfied. Medication Reconciliation: complete.    Devi Vivar LPN  3/8/2019 10:39 AM

## 2019-03-08 NOTE — PROGRESS NOTES
SUBJECTIVE:   Jarad Purvis is a 68 year old male who presents to clinic today for the following health issues: Med refills cpap issues patient arrives here for medication renewal.      Patient also needs a prescription for his CPAP.  States is been going through [employer] with difficulty getting holding him.  I did receive a request and part of the criteria of refilling his CPAP was that he need to be seen within 6 months.  He continues to use his CPAP on a regular basis.  Gets extremely good relief.  He is looking to change his provider and go to [place].  Requesting a written prescription.  Otherwise i is in need of medications including Flomax.  His blood pressure medications.  States last time he got any supplies from his previous provider the mask was the wrong size.          Patient Active Problem List    Diagnosis Date Noted     Cerebral palsy (H) 01/25/2018     Priority: Medium     Overview:   right arm flexion contractures       Diverticular disease of colon 01/25/2018     Priority: Medium     Hiatal hernia 01/25/2018     Priority: Medium     Hypertension 01/25/2018     Priority: Medium     Primary thrombocytopenia (H) 01/25/2018     Priority: Medium     Convulsions (H) 01/25/2018     Priority: Medium     Overview:   last seizure age 14  no longer on medications       Sleep apnea 01/25/2018     Priority: Medium     Elevated blood sugar 09/03/2015     Priority: Medium     Hemorrhoids 01/16/2015     Priority: Medium     Incontinence 09/30/2013     Priority: Medium     H/O colonoscopy 10/08/2012     Priority: Medium     Overview:   2008       Corns and callosities 12/12/2011     Priority: Medium     Personal history of other mental disorder 06/03/2010     Priority: Medium     History reviewed. No pertinent past medical history.   Past Surgical History:   Procedure Laterality Date     CHOLECYSTECTOMY      No Comments Provided     COLONOSCOPY  06/01/2010     ESOPHAGOSCOPY, GASTROSCOPY, DUODENOSCOPY (EGD),  COMBINED      1998     EXTRACAPSULAR CATARACT EXTRATION WITH INTRAOCULAR LENS IMPLANT      rt     OTHER SURGICAL HISTORY      35731.1,CA LAP VENTRAL INCISIONAL HERNIORRAPHY,with mesh     OTHER SURGICAL HISTORY      15177.0,CA FUSION OF WRIST JOINT     OTHER SURGICAL HISTORY      432135,OTHER     OTHER SURGICAL HISTORY      366560,BONE MARROW ASPIRATION     Current Outpatient Medications   Medication Sig Dispense Refill     Ascorbic Acid (VITAMIN C) 100 MG TABS Take 1 tablet by mouth daily       hydrochlorothiazide (HYDRODIURIL) 25 MG tablet Take 1 tablet (25 mg) by mouth daily 90 tablet 3     Misc Natural Products (MENS PROSTATE HEALTH FORMULA PO) Take 1 capsule by mouth daily       Multiple Vitamins-Minerals (MULTI-VITAMIN/MINERALS) TABS Take 1 tablet by mouth daily       order for DME Equipment being ordered: CPAP and supplies 1 Units 0     potassium 99 MG TABS        PREVIDENT 5000 DRY MOUTH 1.1 % GEL topical gel AS DIRECTED by dentist.  5     propranolol (INDERAL) 40 MG tablet Take 1 tablet (40 mg) by mouth 2 times daily 180 tablet 3     Sharps Container (SHARPS-A-GATOR LOCKING BRACKET) MISC CPAP, heated humidifier, mask, headgear, filters and tubing. For home use. Pressure: ?  Length of Need: lifetime       tamsulosin (FLOMAX) 0.4 MG capsule Take 1 capsule (0.4 mg) by mouth daily 90 capsule 3     Allergies   Allergen Reactions     Phenobarbital Hives       Review of Systems     OBJECTIVE:     /68 (BP Location: Left arm, Patient Position: Sitting)   Pulse 64   Temp 97.6  F (36.4  C)   Resp 20   Ht 1.829 m (6')   Wt 120.2 kg (265 lb)   BMI 35.94 kg/m    Body mass index is 35.94 kg/m .  Physical Exam   Constitutional: He appears well-developed.   HENT:   Head: Normocephalic.   Eyes: Pupils are equal, round, and reactive to light.   Pulmonary/Chest: Effort normal.   Neurological: He is alert.   Skin: Skin is warm.       Diagnostic Test Results:  Results for orders placed or performed in visit on  03/08/19 (from the past 24 hour(s))   Basic Metabolic Panel   Result Value Ref Range    Sodium 136 134 - 144 mmol/L    Potassium 4.0 3.5 - 5.1 mmol/L    Chloride 102 98 - 107 mmol/L    Carbon Dioxide 25 21 - 31 mmol/L    Anion Gap 9 3 - 14 mmol/L    Glucose 107 (H) 70 - 105 mg/dL    Urea Nitrogen 18 7 - 25 mg/dL    Creatinine 0.81 0.70 - 1.30 mg/dL    GFR Estimate >90 >60 mL/min/[1.73_m2]    GFR Estimate If Black >90 >60 mL/min/[1.73_m2]    Calcium 9.5 8.6 - 10.3 mg/dL       ASSESSMENT/PLAN:         1. Overflow incontinence of urine  Refill continues to have symptoms without the medication.   - tamsulosin (FLOMAX) 0.4 MG capsule; Take 1 capsule (0.4 mg) by mouth daily  Dispense: 90 capsule; Refill: 3    2. Essential hypertension  Currently under good control.  His blood work is satisfactory.  - hydrochlorothiazide (HYDRODIURIL) 25 MG tablet; Take 1 tablet (25 mg) by mouth daily  Dispense: 90 tablet; Refill: 3  - propranolol (INDERAL) 40 MG tablet; Take 1 tablet (40 mg) by mouth 2 times daily  Dispense: 180 tablet; Refill: 3  - Basic Metabolic Panel; Future  - Basic Metabolic Panel    3. Obstructive sleep apnea syndrome  DME is filled out.  Patient medically continues to qualify for CPAP.  - order for DME; Equipment being ordered: CPAP and supplies  Dispense: 1 Units; Refill: 0  25-30 minutes spent with the patient reviewing medical records reviewing forms that have arrived previously.    Neal Enriquez MD  [facility]

## 2019-03-25 ENCOUNTER — OFFICE VISIT (OUTPATIENT)
Dept: FAMILY MEDICINE | Facility: OTHER | Age: 69
End: 2019-03-25
Attending: FAMILY MEDICINE
Payer: COMMERCIAL

## 2019-03-25 VITALS
HEART RATE: 68 BPM | WEIGHT: 266.2 LBS | HEIGHT: 72 IN | DIASTOLIC BLOOD PRESSURE: 88 MMHG | BODY MASS INDEX: 36.06 KG/M2 | RESPIRATION RATE: 20 BRPM | TEMPERATURE: 97.1 F | SYSTOLIC BLOOD PRESSURE: 136 MMHG

## 2019-03-25 DIAGNOSIS — J06.9 VIRAL UPPER RESPIRATORY TRACT INFECTION: ICD-10-CM

## 2019-03-25 DIAGNOSIS — G47.33 OBSTRUCTIVE SLEEP APNEA SYNDROME: Primary | ICD-10-CM

## 2019-03-25 PROCEDURE — 99213 OFFICE O/P EST LOW 20 MIN: CPT | Performed by: FAMILY MEDICINE

## 2019-03-25 PROCEDURE — G0463 HOSPITAL OUTPT CLINIC VISIT: HCPCS

## 2019-03-25 ASSESSMENT — PAIN SCALES - GENERAL: PAINLEVEL: NO PAIN (0)

## 2019-03-25 ASSESSMENT — MIFFLIN-ST. JEOR: SCORE: 2005.48

## 2019-03-25 ASSESSMENT — PATIENT HEALTH QUESTIONNAIRE - PHQ9: SUM OF ALL RESPONSES TO PHQ QUESTIONS 1-9: 0

## 2019-03-25 NOTE — NURSING NOTE
Patient here for sinus problems for the past 3-4 days. He says there is an odor to drainage that is green in color. He is having trouble getting his new CPAP machine and supplies and he feels that the infection is form this. Medication Reconciliation: complete.    Devi Vivar LPN  3/25/2019 8:58 AM

## 2019-03-25 NOTE — PROGRESS NOTES
SUBJECTIVE:   Jarad Purvis is a 68 year old male who presents to clinic today for the following health issues: Possible sinus infection    Patient arrives here for possible sinus infection.  He reports greenish discharge that is very smelly.  He reports is being green in order us.  He initially had a cold 1/2 weeks ago.  But over the last 2-3 days he has developed this discharge.  He is used NyQuil and DayQuil without any improvement.  He has been also having trouble getting CPAP.  He has been advised he may need to do a sleep study again.  He will try to get his sleep study done years ago.          Patient Active Problem List    Diagnosis Date Noted     Cerebral palsy (H) 01/25/2018     Priority: Medium     Overview:   right arm flexion contractures       Diverticular disease of colon 01/25/2018     Priority: Medium     Hiatal hernia 01/25/2018     Priority: Medium     Hypertension 01/25/2018     Priority: Medium     Primary thrombocytopenia (H) 01/25/2018     Priority: Medium     Convulsions (H) 01/25/2018     Priority: Medium     Overview:   last seizure age 14  no longer on medications       Sleep apnea 01/25/2018     Priority: Medium     Elevated blood sugar 09/03/2015     Priority: Medium     Hemorrhoids 01/16/2015     Priority: Medium     Incontinence 09/30/2013     Priority: Medium     H/O colonoscopy 10/08/2012     Priority: Medium     Overview:   2008       Corns and callosities 12/12/2011     Priority: Medium     Personal history of other mental disorder 06/03/2010     Priority: Medium     History reviewed. No pertinent past medical history.     Review of Systems     OBJECTIVE:     /88 (BP Location: Left arm, Patient Position: Sitting)   Pulse 68   Temp 97.1  F (36.2  C)   Resp 20   Ht 1.829 m (6')   Wt 120.7 kg (266 lb 3.2 oz)   BMI 36.10 kg/m    Body mass index is 36.1 kg/m .  Physical Exam   Constitutional: He appears well-developed and well-nourished.   HENT:   Head: Normocephalic.    Eyes: Pupils are equal, round, and reactive to light.   Cardiovascular: Normal rate and regular rhythm.   Pulmonary/Chest: Effort normal and breath sounds normal.       none     ASSESSMENT/PLAN:         1. Obstructive sleep apnea syndrome  Sleep study if needed to continue his CPAP reassurance is given.      2. Viral upper respiratory tract infection  Trial of Flonase.  If no improvement call later in the week for for antibiotic therapy        Neal Enriquez MD  [facility] AND [facility]

## 2019-04-08 ENCOUNTER — MEDICAL CORRESPONDENCE (OUTPATIENT)
Dept: HEALTH INFORMATION MANAGEMENT | Facility: OTHER | Age: 69
End: 2019-04-08

## 2019-04-09 ENCOUNTER — TELEPHONE (OUTPATIENT)
Dept: FAMILY MEDICINE | Facility: OTHER | Age: 69
End: 2019-04-09

## 2019-04-09 DIAGNOSIS — G47.33 OBSTRUCTIVE SLEEP APNEA SYNDROME: Primary | ICD-10-CM

## 2019-04-09 NOTE — TELEPHONE ENCOUNTER
Patient states that [facility] needs him to have a sleep study. It has been 25 years since his last one. Please place referral.  Catie Baltazar ....................  4/9/2019   10:49 AM

## 2019-04-09 NOTE — TELEPHONE ENCOUNTER
MBL- Patient would like to get something set up for a sleep study.  He would like to talk to a nurse regarding this and what steps he should take to get this set up.    Lisa Justice on 4/9/2019 at 10:38 AM

## 2019-05-14 ENCOUNTER — THERAPY VISIT (OUTPATIENT)
Dept: SLEEP MEDICINE | Facility: OTHER | Age: 69
End: 2019-05-14
Attending: FAMILY MEDICINE
Payer: MEDICARE

## 2019-05-14 DIAGNOSIS — R06.83 HABITUAL SNORING: Primary | ICD-10-CM

## 2019-05-14 DIAGNOSIS — G47.33 OBSTRUCTIVE SLEEP APNEA SYNDROME: ICD-10-CM

## 2019-05-14 PROCEDURE — 95811 POLYSOM 6/>YRS CPAP 4/> PARM: CPT | Mod: TC

## 2019-05-14 PROCEDURE — 95810 POLYSOM 6/> YRS 4/> PARAM: CPT

## 2019-05-20 ENCOUNTER — OFFICE VISIT (OUTPATIENT)
Dept: PULMONOLOGY | Facility: OTHER | Age: 69
End: 2019-05-20
Attending: INTERNAL MEDICINE
Payer: MEDICARE

## 2019-05-20 VITALS
SYSTOLIC BLOOD PRESSURE: 138 MMHG | RESPIRATION RATE: 20 BRPM | HEIGHT: 72 IN | WEIGHT: 264.2 LBS | DIASTOLIC BLOOD PRESSURE: 86 MMHG | BODY MASS INDEX: 35.78 KG/M2 | HEART RATE: 64 BPM

## 2019-05-20 DIAGNOSIS — G47.33 OSA ON CPAP: Primary | ICD-10-CM

## 2019-05-20 PROCEDURE — G0463 HOSPITAL OUTPT CLINIC VISIT: HCPCS

## 2019-05-20 ASSESSMENT — MIFFLIN-ST. JEOR: SCORE: 1996.4

## 2019-05-20 NOTE — NURSING NOTE
Chief Complaint   Patient presents with     Sleep Study     follow up     Pt present to clinic today for a sleep study follow up. He needs new supplies.  Initial /86 (BP Location: Right arm, Patient Position: Sitting, Cuff Size: Adult Large)   Pulse 64   Resp 20   Ht 6' (1.829 m)   Wt 264 lb 3.2 oz (119.8 kg)   BMI 35.83 kg/m   Estimated body mass index is 35.83 kg/m  as calculated from the following:    Height as of this encounter: 6' (1.829 m).    Weight as of this encounter: 264 lb 3.2 oz (119.8 kg).  Medication Reconciliation: complete    Birgit Erwin LPN

## 2019-05-20 NOTE — PROGRESS NOTES
Sleep Medicine Note  Jarad Purvis  May 20, 2019  [id number]    Chief Complaint: sleep apnea    History of Present Illness: Jarad Purvis is a 68 year old male presenting for above complaint.  He has a history of sleep apnea with polysomnography performed 20 to 25 years ago.  That sleep study is no longer available. The study was done at the [facility].  A new sleep study was performed to document that he still has sleep apnea.  He slept poorly on the night of the sleep study for two reasons.  He slept poor without CPAP but then also slept poor during the titration study because his arthritis symptoms were flaring in his left ankle.  His sleep study confirms sleep apnea with an apnea hypopnea index of 115.4/h.  He was started on CPAP at 5 cm water pressure and still having events at 9 cm water pressure during the titration.  An effective pressure was not identified.  He has an old Respironics System one machine.  The knob is broken on it he can still use it but would like to replace it.  He also needs an order for new mask tubing and supplies.  He will be establishing with [employer] [facility].      Past medical history is significant for cerebral palsy, sleep apnea, diverticular disease of the colon, elevated blood sugars, hypertension and primary thrombocytopenia    Medications:  Current Outpatient Medications   Medication     Ascorbic Acid (VITAMIN C) 100 MG TABS     hydrochlorothiazide (HYDRODIURIL) 25 MG tablet     Misc Natural Products (MENS PROSTATE HEALTH FORMULA PO)     Multiple Vitamins-Minerals (MULTI-VITAMIN/MINERALS) TABS     order for DME     potassium 99 MG TABS     propranolol (INDERAL) 40 MG tablet     Sharps Container (SHARPS-A-GATOR LOCKING BRACKET) MISC     tamsulosin (FLOMAX) 0.4 MG capsule     No current facility-administered medications for this visit.        Physical Exam:  /86 (BP Location: Right arm, Patient Position: Sitting, Cuff Size: Adult Large)    Pulse 64   Resp 20   Ht 6' (1.829 m)   Wt 264 lb 3.2 oz (119.8 kg)   BMI 35.83 kg/m    Neck circumference 19-3/4 inches, pharynx is without erythema, dentition is intact, posterior crowding, malampatti class III.  No significant tonsillar tissue.  Lungs are clear no wheeze, rhonchi, crackles, or focal dullness.  Cardiovascular exam S1-S2, regular rhythm and rate, no murmur, rub, or gallop.    Assessment and Plan:  68 year old male presenting for severe obstructive sleep apnea.  The plan is to continue CPAP.  He did not bring his CPAP machine and were not sure what pressure he is on. I will have him use auto titrate mode 8 to 18 cm water pressure.  When he gets his new CPAP machine at his DME appointment, I told him to bring his old machine so we can download that machine to see what pressure he was on and how effective it was.  We discussed a follow-up visit after he has been on CPAP to assess efficacy, tolerance, benefit, and compliance with his new CPAP machine..    CC: Dr. Neal Enriquez  CC: [facility] [facility] [employer] [facility]

## 2019-07-08 ENCOUNTER — OFFICE VISIT (OUTPATIENT)
Dept: FAMILY MEDICINE | Facility: OTHER | Age: 69
End: 2019-07-08
Attending: FAMILY MEDICINE
Payer: COMMERCIAL

## 2019-07-08 VITALS
OXYGEN SATURATION: 97 % | TEMPERATURE: 97.9 F | WEIGHT: 260.2 LBS | BODY MASS INDEX: 35.24 KG/M2 | HEART RATE: 80 BPM | HEIGHT: 72 IN | DIASTOLIC BLOOD PRESSURE: 84 MMHG | RESPIRATION RATE: 16 BRPM | SYSTOLIC BLOOD PRESSURE: 136 MMHG

## 2019-07-08 DIAGNOSIS — G47.33 OBSTRUCTIVE SLEEP APNEA SYNDROME: Primary | ICD-10-CM

## 2019-07-08 PROCEDURE — G0463 HOSPITAL OUTPT CLINIC VISIT: HCPCS

## 2019-07-08 PROCEDURE — 99213 OFFICE O/P EST LOW 20 MIN: CPT | Performed by: FAMILY MEDICINE

## 2019-07-08 ASSESSMENT — PAIN SCALES - GENERAL: PAINLEVEL: NO PAIN (0)

## 2019-07-08 ASSESSMENT — MIFFLIN-ST. JEOR: SCORE: 1973.26

## 2019-07-08 NOTE — NURSING NOTE
Patient presents to the clinic today for follow up CPAP.  Margy Singh LPN 7/8/2019   10:23 AM    Chief Complaint   Patient presents with     RECHECK     CPAP       Initial /84 (BP Location: Left arm, Patient Position: Sitting, Cuff Size: Adult Regular)   Pulse 80   Temp 97.9  F (36.6  C) (Tympanic)   Resp 16   Ht 1.829 m (6')   Wt 118 kg (260 lb 3.2 oz)   SpO2 97%   BMI 35.29 kg/m   Estimated body mass index is 35.29 kg/m  as calculated from the following:    Height as of this encounter: 1.829 m (6').    Weight as of this encounter: 118 kg (260 lb 3.2 oz).  Medication Reconciliation: complete

## 2019-07-08 NOTE — PROGRESS NOTES
SUBJECTIVE:   Jarad Purvis is a 69 year old male who presents to clinic today for the following health issues: Follow-up CPAP    Patient arrives here for follow-up CPAP.  He recently did get a new machine and it was set at 8-18 auto titrate.  And he reports he is doing very well.  Is been using it nightly.  He was advised to follow-up here for a visit by home medical.        Patient Active Problem List    Diagnosis Date Noted     Cerebral palsy (H) 01/25/2018     Priority: Medium     Overview:   right arm flexion contractures       Diverticular disease of colon 01/25/2018     Priority: Medium     Hiatal hernia 01/25/2018     Priority: Medium     Hypertension 01/25/2018     Priority: Medium     Primary thrombocytopenia (H) 01/25/2018     Priority: Medium     Convulsions (H) 01/25/2018     Priority: Medium     Overview:   last seizure age 14  no longer on medications       Sleep apnea 01/25/2018     Priority: Medium     Elevated blood sugar 09/03/2015     Priority: Medium     Hemorrhoids 01/16/2015     Priority: Medium     Incontinence 09/30/2013     Priority: Medium     H/O colonoscopy 10/08/2012     Priority: Medium     Overview:   2008       Corns and callosities 12/12/2011     Priority: Medium     Personal history of other mental disorder 06/03/2010     Priority: Medium     History reviewed. No pertinent past medical history.   Past Surgical History:   Procedure Laterality Date     CHOLECYSTECTOMY      No Comments Provided     COLONOSCOPY  06/01/2010     ESOPHAGOSCOPY, GASTROSCOPY, DUODENOSCOPY (EGD), COMBINED      1998     EXTRACAPSULAR CATARACT EXTRATION WITH INTRAOCULAR LENS IMPLANT      rt     OTHER SURGICAL HISTORY      54984.1,VT LAP VENTRAL INCISIONAL HERNIORRAPHY,with mesh     OTHER SURGICAL HISTORY      85845.0,VT FUSION OF WRIST JOINT     OTHER SURGICAL HISTORY      798005,OTHER     OTHER SURGICAL HISTORY      797202,BONE MARROW ASPIRATION     Allergies   Allergen Reactions     Phenobarbital Hives        Review of Systems     OBJECTIVE:     /84 (BP Location: Left arm, Patient Position: Sitting, Cuff Size: Adult Regular)   Pulse 80   Temp 97.9  F (36.6  C) (Tympanic)   Resp 16   Ht 1.829 m (6')   Wt 118 kg (260 lb 3.2 oz)   SpO2 97%   BMI 35.29 kg/m    Body mass index is 35.29 kg/m .  Physical Exam   Constitutional: He appears well-developed.   Cardiovascular: Normal rate.   Neurological: He is alert.   Skin: Skin is warm.       Diagnostic Test Results:  none     ASSESSMENT/PLAN:       1. Obstructive sleep apnea syndrome  Using it nightly.  Tolerating the machine very well and is very happy with it.  He did need a repeat sleep study that confirmed so severe sleep apnea.  Sleep study clinic note reviewed.        Neal Enriquez MD  [facility] AND [facility]

## 2019-10-17 ENCOUNTER — ALLIED HEALTH/NURSE VISIT (OUTPATIENT)
Dept: FAMILY MEDICINE | Facility: OTHER | Age: 69
End: 2019-10-17
Attending: FAMILY MEDICINE
Payer: MEDICARE

## 2019-10-17 DIAGNOSIS — Z23 NEED FOR PROPHYLACTIC VACCINATION AND INOCULATION AGAINST INFLUENZA: Primary | ICD-10-CM

## 2019-10-17 PROCEDURE — G0008 ADMIN INFLUENZA VIRUS VAC: HCPCS

## 2019-10-17 PROCEDURE — 90662 IIV NO PRSV INCREASED AG IM: CPT

## 2020-03-30 DIAGNOSIS — N39.490 OVERFLOW INCONTINENCE OF URINE: ICD-10-CM

## 2020-03-30 DIAGNOSIS — I10 ESSENTIAL HYPERTENSION: ICD-10-CM

## 2020-03-30 RX ORDER — HYDROCHLOROTHIAZIDE 25 MG/1
TABLET ORAL
Qty: 90 TABLET | Refills: 0 | Status: SHIPPED | OUTPATIENT
Start: 2020-03-30 | End: 2020-07-03

## 2020-03-30 RX ORDER — TAMSULOSIN HYDROCHLORIDE 0.4 MG/1
0.4 CAPSULE ORAL DAILY
Qty: 90 CAPSULE | Refills: 1 | Status: SHIPPED | OUTPATIENT
Start: 2020-03-30 | End: 2020-07-14

## 2020-03-30 RX ORDER — PROPRANOLOL HYDROCHLORIDE 40 MG/1
TABLET ORAL
Qty: 180 TABLET | Refills: 1 | Status: SHIPPED | OUTPATIENT
Start: 2020-03-30 | End: 2020-07-14

## 2020-03-30 NOTE — TELEPHONE ENCOUNTER
"[facility] sent Rx request for the following:      tamsulosin 0.4 mg capsule   Sig: Take 1 capsule (0.4 mg) by mouth daily      Last Prescription Date:   3/8/2019  Last Fill Qty/Refills:         90, R-3    Last Office Visit:              7/8/2019   Future Office visit:           none       propranolol 40 mg tablet   Sig: TAKE 1 TABLET BY MOUTH 2 TIMES DAILY    Last Prescription Date:   3/8/2019  Last Fill Qty/Refills:         180, R-3         hydrochlorothiazide 25 mg tablet   Sig: TAKE 1 TABLET BY MOUTH ONCE DAILY     Last Prescription Date:   3/8/2019  Last Fill Qty/Refills:         90, R-3          hydrochlorothiazide (HYDRODIURIL) 25 MG tablet [Pharmacy Med Name: hydrochlorothiazide 25 mg tablet] 90 tablet 3     Sig: TAKE 1 TABLET BY MOUTH ONCE DAILY       Diuretics (Including Combos) Protocol Failed - 3/30/2020  8:29 AM        Failed - Normal serum creatinine on file in past 12 months     Recent Labs   Lab Test 03/08/19  1117   CR 0.81              Failed - Normal serum potassium on file in past 12 months     Recent Labs   Lab Test 03/08/19  1117   POTASSIUM 4.0                    Failed - Normal serum sodium on file in past 12 months     Recent Labs   Lab Test 03/08/19  1117                 Passed - Blood pressure under 140/90 in past 12 months     BP Readings from Last 3 Encounters:   07/08/19 136/84   05/20/19 138/86   03/25/19 136/88                 Passed - Recent (12 mo) or future (30 days) visit within the authorizing provider's specialty     Patient has had an office visit with the authorizing provider or a provider within the authorizing providers department within the previous 12 mos or has a future within next 30 days. See \"Patient Info\" tab in inbasket, or \"Choose Columns\" in Meds & Orders section of the refill encounter.              Passed - Medication is active on med list        Passed - Patient is age 18 or older             Unable to complete prescription refill per RN Medication Refill " Policy.................... Sathya Holguin RN ....................  3/30/2020   3:50 PM

## 2020-04-29 ENCOUNTER — DOCUMENTATION ONLY (OUTPATIENT)
Dept: OTHER | Facility: CLINIC | Age: 70
End: 2020-04-29

## 2020-07-02 DIAGNOSIS — I10 ESSENTIAL HYPERTENSION: ICD-10-CM

## 2020-07-02 NOTE — LETTER
July 3, 2020      Jarad Purvis  [address] [address]        Dear Jarad,       A refill of hydrochlorothiazide (HYDRODIURIL) 25 MG table has been requested by your pharmacy and we noticed that you are overdue for an annual exam and labs.  A limited supply of your medication was approved and sent to your pharmacy.      Your health is very important to us.  Please call the clinic at [phone] to schedule your appointment.    Thank you for choosing [facility] for your health care needs.    Sincerely,    Refill RN  [facility]

## 2020-07-02 NOTE — LETTER
July 3, 2020      Jarad Purvis  [address] [address]        Dear Jarad,     A refill of hydrochlorothiazide (HYDRODIURIL) 25 MG table has been requested by your pharmacy and we noticed that you are overdue for an annual exam.  A limited supply was filled at this time.     Your health is very important to us.  Please call the clinic at [phone] to schedule your appointment.    Thank you for choosing [facility] and [facility] for your health care needs.    Sincerely,    Refill RN  [facility]

## 2020-07-03 RX ORDER — HYDROCHLOROTHIAZIDE 25 MG/1
TABLET ORAL
Qty: 30 TABLET | Refills: 0 | Status: SHIPPED | OUTPATIENT
Start: 2020-07-03 | End: 2020-07-14

## 2020-07-03 NOTE — TELEPHONE ENCOUNTER
[facility] sent Rx request for the following:   hydrochlorothiazide (HYDRODIURIL) 25 MG table  Sig:  TAKE 1 TABLET BY MOUTH ONCE DAILY   Last Prescription Date:   3/30/2020  Last Fill Qty/Refills:         90, R-0   Last Office Visit:              7/8/2019   Future Office visit:           None      Patient is due for medication management appointment. Limited refill provided at this time. Letter sent for reminder to patient. Prescription refilled per RN Medication Refill Policy.................... Stefania Garibay RN ....................  7/3/2020   12:11 PM

## 2020-07-14 ENCOUNTER — MEDICAL CORRESPONDENCE (OUTPATIENT)
Dept: HEALTH INFORMATION MANAGEMENT | Facility: OTHER | Age: 70
End: 2020-07-14

## 2020-07-14 ENCOUNTER — OFFICE VISIT (OUTPATIENT)
Dept: FAMILY MEDICINE | Facility: OTHER | Age: 70
End: 2020-07-14
Attending: FAMILY MEDICINE
Payer: COMMERCIAL

## 2020-07-14 VITALS
RESPIRATION RATE: 16 BRPM | SYSTOLIC BLOOD PRESSURE: 124 MMHG | TEMPERATURE: 97.6 F | DIASTOLIC BLOOD PRESSURE: 72 MMHG | HEART RATE: 76 BPM | WEIGHT: 241 LBS | BODY MASS INDEX: 32.69 KG/M2

## 2020-07-14 DIAGNOSIS — N39.490 OVERFLOW INCONTINENCE OF URINE: ICD-10-CM

## 2020-07-14 DIAGNOSIS — I10 ESSENTIAL HYPERTENSION: Primary | ICD-10-CM

## 2020-07-14 DIAGNOSIS — Z12.11 SPECIAL SCREENING FOR MALIGNANT NEOPLASMS, COLON: ICD-10-CM

## 2020-07-14 DIAGNOSIS — Z87.891 HISTORY OF TOBACCO ABUSE: ICD-10-CM

## 2020-07-14 LAB
ANION GAP SERPL CALCULATED.3IONS-SCNC: 9 MMOL/L (ref 3–14)
BUN SERPL-MCNC: 20 MG/DL (ref 7–25)
CALCIUM SERPL-MCNC: 9.3 MG/DL (ref 8.6–10.3)
CHLORIDE SERPL-SCNC: 103 MMOL/L (ref 98–107)
CHOLEST SERPL-MCNC: 141 MG/DL
CO2 SERPL-SCNC: 25 MMOL/L (ref 21–31)
CREAT SERPL-MCNC: 1.07 MG/DL (ref 0.7–1.3)
GFR SERPL CREATININE-BSD FRML MDRD: 68 ML/MIN/{1.73_M2}
GLUCOSE SERPL-MCNC: 103 MG/DL (ref 70–105)
HDLC SERPL-MCNC: 42 MG/DL (ref 23–92)
LDLC SERPL CALC-MCNC: 66 MG/DL
NONHDLC SERPL-MCNC: 99 MG/DL
POTASSIUM SERPL-SCNC: 4.1 MMOL/L (ref 3.5–5.1)
SODIUM SERPL-SCNC: 137 MMOL/L (ref 134–144)
TRIGL SERPL-MCNC: 164 MG/DL

## 2020-07-14 PROCEDURE — 99214 OFFICE O/P EST MOD 30 MIN: CPT | Performed by: FAMILY MEDICINE

## 2020-07-14 PROCEDURE — 36415 COLL VENOUS BLD VENIPUNCTURE: CPT | Mod: ZL | Performed by: FAMILY MEDICINE

## 2020-07-14 PROCEDURE — 80048 BASIC METABOLIC PNL TOTAL CA: CPT | Mod: ZL | Performed by: FAMILY MEDICINE

## 2020-07-14 PROCEDURE — 80061 LIPID PANEL: CPT | Mod: ZL | Performed by: FAMILY MEDICINE

## 2020-07-14 PROCEDURE — G0463 HOSPITAL OUTPT CLINIC VISIT: HCPCS

## 2020-07-14 RX ORDER — HYDROCHLOROTHIAZIDE 25 MG/1
25 TABLET ORAL DAILY
Qty: 30 TABLET | Refills: 4 | Status: SHIPPED | OUTPATIENT
Start: 2020-07-14 | End: 2020-12-23

## 2020-07-14 RX ORDER — PROPRANOLOL HYDROCHLORIDE 40 MG/1
40 TABLET ORAL 2 TIMES DAILY
Qty: 180 TABLET | Refills: 4 | Status: SHIPPED | OUTPATIENT
Start: 2020-07-14 | End: 2021-10-11

## 2020-07-14 RX ORDER — TAMSULOSIN HYDROCHLORIDE 0.4 MG/1
0.4 CAPSULE ORAL DAILY
Qty: 90 CAPSULE | Refills: 4 | Status: SHIPPED | OUTPATIENT
Start: 2020-07-14 | End: 2021-10-11

## 2020-07-14 ASSESSMENT — PATIENT HEALTH QUESTIONNAIRE - PHQ9
SUM OF ALL RESPONSES TO PHQ QUESTIONS 1-9: 0
5. POOR APPETITE OR OVEREATING: NOT AT ALL

## 2020-07-14 ASSESSMENT — ANXIETY QUESTIONNAIRES
2. NOT BEING ABLE TO STOP OR CONTROL WORRYING: NOT AT ALL
3. WORRYING TOO MUCH ABOUT DIFFERENT THINGS: NOT AT ALL
7. FEELING AFRAID AS IF SOMETHING AWFUL MIGHT HAPPEN: NOT AT ALL
5. BEING SO RESTLESS THAT IT IS HARD TO SIT STILL: NOT AT ALL
6. BECOMING EASILY ANNOYED OR IRRITABLE: NOT AT ALL
GAD7 TOTAL SCORE: 0
1. FEELING NERVOUS, ANXIOUS, OR ON EDGE: NOT AT ALL

## 2020-07-14 ASSESSMENT — PAIN SCALES - GENERAL: PAINLEVEL: NO PAIN (0)

## 2020-07-14 NOTE — LETTER
August 6, 2020      Jarad Purvis  [address] [address] [address]        Dear ,    We are writing to inform you of your test results.    Enclosed are your results and as you can see your Cologuard test did come back negative.    Resulted Orders   Lipid Panel   Result Value Ref Range    Cholesterol 141 <200 mg/dL    Triglycerides 164 (H) <150 mg/dL      Comment:      Borderline high:  150-199 mg/dl  High:             200-499 mg/dl  Very high:       >499 mg/dl      HDL Cholesterol 42 23 - 92 mg/dL    LDL Cholesterol Calculated 66 <100 mg/dL      Comment:      Desirable:       <100 mg/dl    Non HDL Cholesterol 99 <130 mg/dL   Basic Metabolic Panel   Result Value Ref Range    Sodium 137 134 - 144 mmol/L    Potassium 4.1 3.5 - 5.1 mmol/L    Chloride 103 98 - 107 mmol/L    Carbon Dioxide 25 21 - 31 mmol/L    Anion Gap 9 3 - 14 mmol/L    Glucose 103 70 - 105 mg/dL    Urea Nitrogen 20 7 - 25 mg/dL    Creatinine 1.07 0.70 - 1.30 mg/dL    GFR Estimate 68 >60 mL/min/[1.73_m2]    GFR Estimate If Black 82 >60 mL/min/[1.73_m2]    Calcium 9.3 8.6 - 10.3 mg/dL   ABSTRACT COLOGUARD-NO CHARGE   Result Value Ref Range    COLOGUARD-ABSTRACT Negative        If you have any questions or concerns, please call the clinic at the number listed above.       Sincerely,        Neal Enriquez MD

## 2020-07-14 NOTE — NURSING NOTE
Patient here for medication refills, check a healed up tick bite and order colonoscopy. Medication Reconciliation: complete.    Devi Vivar LPN  7/14/2020 1:08 PM

## 2020-07-14 NOTE — LETTER
July 16, 2020      Jarad Purvis  [address] [address] [address]        Dear ,    We are writing to inform you of your test results.    Your test results fall within the expected range(s) or remain unchanged from previous results.  Please continue with current treatment plan.    Resulted Orders   Lipid Panel   Result Value Ref Range    Cholesterol 141 <200 mg/dL    Triglycerides 164 (H) <150 mg/dL      Comment:      Borderline high:  150-199 mg/dl  High:             200-499 mg/dl  Very high:       >499 mg/dl      HDL Cholesterol 42 23 - 92 mg/dL    LDL Cholesterol Calculated 66 <100 mg/dL      Comment:      Desirable:       <100 mg/dl    Non HDL Cholesterol 99 <130 mg/dL   Basic Metabolic Panel   Result Value Ref Range    Sodium 137 134 - 144 mmol/L    Potassium 4.1 3.5 - 5.1 mmol/L    Chloride 103 98 - 107 mmol/L    Carbon Dioxide 25 21 - 31 mmol/L    Anion Gap 9 3 - 14 mmol/L    Glucose 103 70 - 105 mg/dL    Urea Nitrogen 20 7 - 25 mg/dL    Creatinine 1.07 0.70 - 1.30 mg/dL    GFR Estimate 68 >60 mL/min/[1.73_m2]    GFR Estimate If Black 82 >60 mL/min/[1.73_m2]    Calcium 9.3 8.6 - 10.3 mg/dL       If you have any questions or concerns, please call the clinic at the number listed above.       Sincerely,        Neal Enriquez MD

## 2020-07-15 ASSESSMENT — ENCOUNTER SYMPTOMS
FEVER: 0
CHILLS: 0
RESPIRATORY NEGATIVE: 1
EYES NEGATIVE: 1
PSYCHIATRIC NEGATIVE: 1
DIZZINESS: 0

## 2020-07-15 ASSESSMENT — ANXIETY QUESTIONNAIRES: GAD7 TOTAL SCORE: 0

## 2020-07-15 NOTE — PROGRESS NOTES
SUBJECTIVE:   Jarad Purvis is a 70 year old male who presents to clinic today for the following health issues: Medication renewal.  Tick bite.  And discussed colonoscopy    Patient arrives here for medication renewal.  Patient is in need of medication for urinary incontinence and hypertension.  Review of the chart shows he also is in need of a AAA.  He would also like to discuss colonoscopy he is in need of colonoscopy.  But states he is more interested in Cologuard.Last colonoscopy was 2010 which was normal.  No family history of colon cancer that is healed up.  He denies any fevers chills.  No other complaints.  Tolerating the Flomax well.  With improvement in his urinary.  Blood pressure has been under good control..  Finally he is also had a tick bite on his left arm.        Patient Active Problem List    Diagnosis Date Noted     Cerebral palsy (H) 01/25/2018     Priority: Medium     Overview:   right arm flexion contractures       Diverticular disease of colon 01/25/2018     Priority: Medium     Hiatal hernia 01/25/2018     Priority: Medium     Hypertension 01/25/2018     Priority: Medium     Primary thrombocytopenia (H) 01/25/2018     Priority: Medium     Convulsions (H) 01/25/2018     Priority: Medium     Overview:   last seizure age 14  no longer on medications       Sleep apnea 01/25/2018     Priority: Medium     Elevated blood sugar 09/03/2015     Priority: Medium     Hemorrhoids 01/16/2015     Priority: Medium     Incontinence 09/30/2013     Priority: Medium     H/O colonoscopy 10/08/2012     Priority: Medium     Overview:   2008       Corns and callosities 12/12/2011     Priority: Medium     Personal history of other mental disorder 06/03/2010     Priority: Medium     History reviewed. No pertinent past medical history.   Past Surgical History:   Procedure Laterality Date     CHOLECYSTECTOMY      No Comments Provided     COLONOSCOPY  06/01/2010     ESOPHAGOSCOPY, GASTROSCOPY, DUODENOSCOPY (EGD),  COMBINED      1998     EXTRACAPSULAR CATARACT EXTRATION WITH INTRAOCULAR LENS IMPLANT      rt     OTHER SURGICAL HISTORY      96722.1,VA LAP VENTRAL INCISIONAL HERNIORRAPHY,with mesh     OTHER SURGICAL HISTORY      46263.0,VA FUSION OF WRIST JOINT     OTHER SURGICAL HISTORY      031998,OTHER     OTHER SURGICAL HISTORY      989133,BONE MARROW ASPIRATION     Allergies   Allergen Reactions     Phenobarbital Hives       Review of Systems   Constitutional: Negative for chills and fever.   Eyes: Negative.    Respiratory: Negative.    Cardiovascular: Negative for chest pain.   Genitourinary: Negative.    Neurological: Negative for dizziness.   Psychiatric/Behavioral: Negative.         OBJECTIVE:     /72   Pulse 76   Temp 97.6  F (36.4  C)   Resp 16   Wt 109.3 kg (241 lb)   BMI 32.69 kg/m    Body mass index is 32.69 kg/m .  Physical Exam  Constitutional:       Appearance: Normal appearance.   HENT:      Nose: Nose normal.      Mouth/Throat:      Mouth: Mucous membranes are moist.   Cardiovascular:      Rate and Rhythm: Normal rate and regular rhythm.   Pulmonary:      Effort: Pulmonary effort is normal.      Breath sounds: Normal breath sounds.   Neurological:      Mental Status: He is alert.   Psychiatric:         Mood and Affect: Mood normal.         Diagnostic Test Results:  Results for orders placed or performed in visit on 07/14/20   Lipid Panel     Status: Abnormal   Result Value Ref Range    Cholesterol 141 <200 mg/dL    Triglycerides 164 (H) <150 mg/dL    HDL Cholesterol 42 23 - 92 mg/dL    LDL Cholesterol Calculated 66 <100 mg/dL    Non HDL Cholesterol 99 <130 mg/dL   Basic Metabolic Panel     Status: None   Result Value Ref Range    Sodium 137 134 - 144 mmol/L    Potassium 4.1 3.5 - 5.1 mmol/L    Chloride 103 98 - 107 mmol/L    Carbon Dioxide 25 21 - 31 mmol/L    Anion Gap 9 3 - 14 mmol/L    Glucose 103 70 - 105 mg/dL    Urea Nitrogen 20 7 - 25 mg/dL    Creatinine 1.07 0.70 - 1.30 mg/dL    GFR Estimate  68 >60 mL/min/[1.73_m2]    GFR Estimate If Black 82 >60 mL/min/[1.73_m2]    Calcium 9.3 8.6 - 10.3 mg/dL       ASSESSMENT/PLAN:         1. Overflow incontinence of urine  Patient is in need of medication for urinary incontinence.  Hypertension.  - tamsulosin (FLOMAX) 0.4 MG capsule; Take 1 capsule (0.4 mg) by mouth daily  Dispense: 90 capsule; Refill: 4    2. Essential hypertension  Satisfactory lab.  Medication refilled.  - hydrochlorothiazide (HYDRODIURIL) 25 MG tablet; Take 1 tablet (25 mg) by mouth daily  Dispense: 30 tablet; Refill: 4  - propranolol (INDERAL) 40 MG tablet; Take 1 tablet (40 mg) by mouth 2 times daily  Dispense: 180 tablet; Refill: 4  - Basic Metabolic Panel; Future  - Lipid Panel; Future  - Lipid Panel  - Basic Metabolic Panel    3. History of tobacco abuse  AAA ordered.  -  [place] Medicare AAA Screening; Future  Examination of the arm did not really reveal any ECM rash.  Also proceed with a Cologuard paperwork submitted.  Order has been signed I did discuss them with him the results of doing colonoscopy versus Cologuard.          Neal Enriquez MD  [facility] AND [facility]

## 2020-07-21 ENCOUNTER — HOSPITAL ENCOUNTER (OUTPATIENT)
Dept: ULTRASOUND IMAGING | Facility: OTHER | Age: 70
Discharge: HOME | End: 2020-07-21
Attending: FAMILY MEDICINE | Admitting: FAMILY MEDICINE
Payer: MEDICARE

## 2020-07-21 DIAGNOSIS — Z87.891 HISTORY OF TOBACCO ABUSE: ICD-10-CM

## 2020-07-21 PROCEDURE — 76706 US ABDL AORTA SCREEN AAA: CPT

## 2020-07-24 LAB — COLOGUARD-ABSTRACT: NEGATIVE

## 2020-10-30 ENCOUNTER — ALLIED HEALTH/NURSE VISIT (OUTPATIENT)
Dept: FAMILY MEDICINE | Facility: OTHER | Age: 70
End: 2020-10-30
Payer: MEDICARE

## 2020-10-30 DIAGNOSIS — Z23 NEED FOR PROPHYLACTIC VACCINATION AND INOCULATION AGAINST INFLUENZA: Primary | ICD-10-CM

## 2020-10-30 PROCEDURE — 90662 IIV NO PRSV INCREASED AG IM: CPT

## 2020-12-23 DIAGNOSIS — I10 ESSENTIAL HYPERTENSION: ICD-10-CM

## 2020-12-23 RX ORDER — HYDROCHLOROTHIAZIDE 25 MG/1
25 TABLET ORAL DAILY
Qty: 90 TABLET | Refills: 1 | Status: SHIPPED | OUTPATIENT
Start: 2020-12-23 | End: 2021-07-08

## 2020-12-23 NOTE — TELEPHONE ENCOUNTER
[facility] sent Rx request for the following:   hydrochlorothiazide (HYDRODIURIL) 25 MG tablet   Sig: Take 1 tablet (25 mg) by mouth daily    Last Prescription Date:   07/14/2020  Last Fill Qty/Refills:         30, R-4    Last Office Visit:              07/14/2020 (Zoe)   Future Office visit:           None noted   Diuretics (Including Combos) Protocol Passed - 12/23/2020  4:29 PM     Prescription approved per [facility] Refill Protocol.  Aislinn Barahona RN ....................  12/23/2020   4:38 PM

## 2021-03-05 ENCOUNTER — IMMUNIZATION (OUTPATIENT)
Dept: FAMILY MEDICINE | Facility: OTHER | Age: 71
End: 2021-03-05
Attending: FAMILY MEDICINE
Payer: MEDICARE

## 2021-03-05 PROCEDURE — 91300 PR COVID VAC PFIZER DIL RECON 30 MCG/0.3 ML IM: CPT

## 2021-03-11 ENCOUNTER — OFFICE VISIT (OUTPATIENT)
Dept: FAMILY MEDICINE | Facility: OTHER | Age: 71
End: 2021-03-11
Attending: FAMILY MEDICINE
Payer: COMMERCIAL

## 2021-03-11 VITALS
OXYGEN SATURATION: 96 % | RESPIRATION RATE: 16 BRPM | HEART RATE: 72 BPM | SYSTOLIC BLOOD PRESSURE: 120 MMHG | TEMPERATURE: 97.7 F | WEIGHT: 251.8 LBS | DIASTOLIC BLOOD PRESSURE: 68 MMHG | BODY MASS INDEX: 34.15 KG/M2

## 2021-03-11 DIAGNOSIS — L60.0 INGROWN TOENAIL: Primary | ICD-10-CM

## 2021-03-11 PROCEDURE — 99213 OFFICE O/P EST LOW 20 MIN: CPT | Performed by: FAMILY MEDICINE

## 2021-03-11 PROCEDURE — G0463 HOSPITAL OUTPT CLINIC VISIT: HCPCS

## 2021-03-11 RX ORDER — CEPHALEXIN 500 MG/1
500 CAPSULE ORAL 2 TIMES DAILY
Qty: 20 CAPSULE | Refills: 0 | Status: SHIPPED | OUTPATIENT
Start: 2021-03-11 | End: 2021-03-21

## 2021-03-11 ASSESSMENT — PAIN SCALES - GENERAL: PAINLEVEL: NO PAIN (1)

## 2021-03-11 NOTE — PROGRESS NOTES
SUBJECTIVE:   Jarad Purvis is a 70 year old male who presents to clinic today for the following health issues: Ingrown toenail    Patient arrives here for an ingrown toenail.  He reports is been going on for last 2 weeks.  Started having some discharge.  Increasing pain.  He is also wondering what he can use for gas.  He does have a CPAP.        Patient Active Problem List    Diagnosis Date Noted     Cerebral palsy (H) 01/25/2018     Priority: Medium     Overview:   right arm flexion contractures       Diverticular disease of colon 01/25/2018     Priority: Medium     Hiatal hernia 01/25/2018     Priority: Medium     Hypertension 01/25/2018     Priority: Medium     Primary thrombocytopenia (H) 01/25/2018     Priority: Medium     Convulsions (H) 01/25/2018     Priority: Medium     Overview:   last seizure age 14  no longer on medications       Sleep apnea 01/25/2018     Priority: Medium     Elevated blood sugar 09/03/2015     Priority: Medium     Hemorrhoids 01/16/2015     Priority: Medium     Incontinence 09/30/2013     Priority: Medium     H/O colonoscopy 10/08/2012     Priority: Medium     Overview:   2008       Corns and callosities 12/12/2011     Priority: Medium     Personal history of other mental disorder 06/03/2010     Priority: Medium     No past medical history on file.     Review of Systems     OBJECTIVE:     /68   Pulse 72   Temp 97.7  F (36.5  C)   Resp 16   Wt 114.2 kg (251 lb 12.8 oz)   SpO2 96%   BMI 34.15 kg/m    Body mass index is 34.15 kg/m .  Physical Exam  Constitutional:       Appearance: He is obese.   Musculoskeletal:      Comments: Patient has ingrown toenail on his left side.  Laterally he has some swelling.  Some redness.   Neurological:      Mental Status: He is alert.         Diagnostic Test Results:  none     ASSESSMENT/PLAN:         1. Ingrown toenail with secondary infection  Offered antibiotics versus removal.  Patient would like to wait on the removal.  We will  proceed with antibiotics at this time.  Follow-up if not improving  - cephALEXin (KEFLEX) 500 MG capsule; Take 1 capsule (500 mg) by mouth 2 times daily for 10 days  Dispense: 20 capsule; Refill: 0      Neal Enriquez MD  [facility] AND [facility]

## 2021-03-26 ENCOUNTER — IMMUNIZATION (OUTPATIENT)
Dept: FAMILY MEDICINE | Facility: OTHER | Age: 71
End: 2021-03-26
Attending: FAMILY MEDICINE
Payer: MEDICARE

## 2021-03-26 PROCEDURE — 91300 PR COVID VAC PFIZER DIL RECON 30 MCG/0.3 ML IM: CPT

## 2021-04-25 ENCOUNTER — HEALTH MAINTENANCE LETTER (OUTPATIENT)
Age: 71
End: 2021-04-25

## 2021-05-20 ENCOUNTER — TELEPHONE (OUTPATIENT)
Dept: FAMILY MEDICINE | Facility: OTHER | Age: 71
End: 2021-05-20

## 2021-05-20 DIAGNOSIS — G80.9 CEREBRAL PALSY, UNSPECIFIED TYPE (H): Primary | ICD-10-CM

## 2021-05-20 NOTE — TELEPHONE ENCOUNTER
Needs a prescription faxed [phone] to [facility] for his shoes that go over brace-No call back unless needed

## 2021-05-20 NOTE — TELEPHONE ENCOUNTER
Order needs to say orthopedic shoes, no need for a foot exam per [facility].        Order pending.      Sheeba Leung LPN 5/20/2021 11:46 AM

## 2021-06-13 ENCOUNTER — APPOINTMENT (OUTPATIENT)
Dept: GENERAL RADIOLOGY | Facility: OTHER | Age: 71
End: 2021-06-13
Attending: PHYSICIAN ASSISTANT
Payer: MEDICARE

## 2021-06-13 ENCOUNTER — APPOINTMENT (OUTPATIENT)
Dept: ULTRASOUND IMAGING | Facility: OTHER | Age: 71
End: 2021-06-13
Attending: PHYSICIAN ASSISTANT
Payer: MEDICARE

## 2021-06-13 ENCOUNTER — HOSPITAL ENCOUNTER (EMERGENCY)
Facility: OTHER | Age: 71
Discharge: HOME | End: 2021-06-13
Attending: PHYSICIAN ASSISTANT | Admitting: PHYSICIAN ASSISTANT
Payer: MEDICARE

## 2021-06-13 VITALS
WEIGHT: 251 LBS | OXYGEN SATURATION: 97 % | TEMPERATURE: 96.7 F | RESPIRATION RATE: 18 BRPM | HEIGHT: 72 IN | HEART RATE: 67 BPM | BODY MASS INDEX: 34 KG/M2 | SYSTOLIC BLOOD PRESSURE: 109 MMHG | DIASTOLIC BLOOD PRESSURE: 88 MMHG

## 2021-06-13 DIAGNOSIS — A69.20 ERYTHEMA MIGRANS (LYME DISEASE): ICD-10-CM

## 2021-06-13 DIAGNOSIS — M79.89 SWELLING OF LEFT LOWER EXTREMITY: ICD-10-CM

## 2021-06-13 PROCEDURE — 99283 EMERGENCY DEPT VISIT LOW MDM: CPT | Performed by: PHYSICIAN ASSISTANT

## 2021-06-13 PROCEDURE — 36415 COLL VENOUS BLD VENIPUNCTURE: CPT | Performed by: PHYSICIAN ASSISTANT

## 2021-06-13 PROCEDURE — 99284 EMERGENCY DEPT VISIT MOD MDM: CPT | Mod: 25 | Performed by: PHYSICIAN ASSISTANT

## 2021-06-13 PROCEDURE — 73630 X-RAY EXAM OF FOOT: CPT | Mod: TC,LT

## 2021-06-13 PROCEDURE — 250N000013 HC RX MED GY IP 250 OP 250 PS 637: Performed by: PHYSICIAN ASSISTANT

## 2021-06-13 PROCEDURE — 86618 LYME DISEASE ANTIBODY: CPT | Performed by: PHYSICIAN ASSISTANT

## 2021-06-13 PROCEDURE — 93971 EXTREMITY STUDY: CPT | Mod: TC,LT

## 2021-06-13 PROCEDURE — 73610 X-RAY EXAM OF ANKLE: CPT | Mod: TC,LT

## 2021-06-13 RX ORDER — DOXYCYCLINE 100 MG/1
100 CAPSULE ORAL ONCE
Status: COMPLETED | OUTPATIENT
Start: 2021-06-13 | End: 2021-06-13

## 2021-06-13 RX ORDER — DOXYCYCLINE 100 MG/1
100 CAPSULE ORAL 2 TIMES DAILY
Qty: 28 CAPSULE | Refills: 0 | Status: SHIPPED | OUTPATIENT
Start: 2021-06-13 | End: 2021-06-27

## 2021-06-13 RX ADMIN — DOXYCYCLINE 100 MG: 100 CAPSULE ORAL at 22:51

## 2021-06-13 RX ADMIN — IBUPROFEN 600 MG: 400 TABLET ORAL at 22:51

## 2021-06-13 ASSESSMENT — MIFFLIN-ST. JEOR: SCORE: 1921.53

## 2021-06-14 LAB — B BURGDOR IGG+IGM SER QL: 0.06 (ref 0–0.89)

## 2021-06-14 ASSESSMENT — ENCOUNTER SYMPTOMS
CHILLS: 0
BACK PAIN: 0
WOUND: 0
SHORTNESS OF BREATH: 0
CHEST TIGHTNESS: 0
ADENOPATHY: 0
ABDOMINAL PAIN: 0
FEVER: 0
CONFUSION: 0
BRUISES/BLEEDS EASILY: 0
HEMATURIA: 0

## 2021-06-14 NOTE — ED PROVIDER NOTES
History     Chief Complaint   Patient presents with     Ankle Pain     Foot Pain     HPI  Jarad Purvis is a 71 year old male who presents to the ED for evaluation of left ankle/foot pain.  He reports having some increased swelling to his left lower extremity from time to time and says this is not much different just a little more painful. Patient twisted his left foot when walking and is now having pain and some swelling. It is painful at rest and to walk on. Patient notes he also possibly has a tick bite on his upper thigh on that same leg he would like to have checked out.     Allergies:  Allergies   Allergen Reactions     Phenobarbital Hives       Problem List:    Patient Active Problem List    Diagnosis Date Noted     Cerebral palsy (H) 01/25/2018     Priority: Medium     Overview:   right arm flexion contractures       Diverticular disease of colon 01/25/2018     Priority: Medium     Hiatal hernia 01/25/2018     Priority: Medium     Hypertension 01/25/2018     Priority: Medium     Primary thrombocytopenia (H) 01/25/2018     Priority: Medium     Convulsions (H) 01/25/2018     Priority: Medium     Overview:   last seizure age 14  no longer on medications       Sleep apnea 01/25/2018     Priority: Medium     Elevated blood sugar 09/03/2015     Priority: Medium     Hemorrhoids 01/16/2015     Priority: Medium     Incontinence 09/30/2013     Priority: Medium     H/O colonoscopy 10/08/2012     Priority: Medium     Overview:   2008       Corns and callosities 12/12/2011     Priority: Medium     Personal history of other mental disorder 06/03/2010     Priority: Medium        Past Medical History:    History reviewed. No pertinent past medical history.    Past Surgical History:    Past Surgical History:   Procedure Laterality Date     CHOLECYSTECTOMY      No Comments Provided     COLONOSCOPY  06/01/2010     ESOPHAGOSCOPY, GASTROSCOPY, DUODENOSCOPY (EGD), COMBINED      1998     EXTRACAPSULAR CATARACT EXTRATION WITH  INTRAOCULAR LENS IMPLANT      rt     OTHER SURGICAL HISTORY      02859.1,AL LAP VENTRAL INCISIONAL HERNIORRAPHY,with mesh     OTHER SURGICAL HISTORY      11899.0,AL FUSION OF WRIST JOINT     OTHER SURGICAL HISTORY      400463,OTHER     OTHER SURGICAL HISTORY      042402,BONE MARROW ASPIRATION       Family History:    Family History   Problem Relation Age of Onset     Breast Cancer Mother         Cancer-breast     Heart Disease Father 74        Heart Disease,MI     Hypertension Brother         Hypertension     Arthritis Brother         Arthritis,Rheumatoid     Diabetes No family hx of         Diabetes       Social History:  Marital Status:   [2]  Social History     Tobacco Use     Smoking status: Former Smoker     Quit date: 10/8/1985     Years since quittin.7     Smokeless tobacco: Former User   Substance Use Topics     Alcohol use: Yes     Alcohol/week: 2.0 standard drinks     Comment: occasional     Drug use: No        Medications:    Ascorbic Acid (VITAMIN C) 100 MG TABS  doxycycline hyclate (VIBRAMYCIN) 100 MG capsule  hydrochlorothiazide (HYDRODIURIL) 25 MG tablet  Misc Natural Products (MENS PROSTATE HEALTH FORMULA PO)  Multiple Vitamins-Minerals (MULTI-VITAMIN/MINERALS) TABS  potassium 99 MG TABS  propranolol (INDERAL) 40 MG tablet  tamsulosin (FLOMAX) 0.4 MG capsule  order for DME  Sharps Container (SHARPS-A-GATOR LOCKING BRACKET) [facility]          Review of Systems   Constitutional: Negative for chills and fever.   HENT: Negative for congestion.    Eyes: Negative for visual disturbance.   Respiratory: Negative for chest tightness and shortness of breath.    Cardiovascular: Negative for chest pain.   Gastrointestinal: Negative for abdominal pain.   Genitourinary: Negative for hematuria.   Musculoskeletal: Negative for back pain.        Left ankle/foot pain with some swelling.   Skin: Positive for rash. Negative for wound.   Neurological: Negative for syncope.   Hematological: Negative for adenopathy.  Does not bruise/bleed easily.   Psychiatric/Behavioral: Negative for confusion.       Physical Exam   BP: (!) 148/76  Pulse: 72  Temp: 96.7  F (35.9  C)  Resp: 16  Height: 182.9 cm (6')  Weight: 113.9 kg (251 lb)  SpO2: 95 %      Physical Exam  Constitutional:       General: He is not in acute distress.     Appearance: He is well-developed. He is not diaphoretic.   HENT:      Head: Normocephalic and atraumatic.   Eyes:      General: No scleral icterus.     Conjunctiva/sclera: Conjunctivae normal.   Neck:      Musculoskeletal: Neck supple.   Cardiovascular:      Rate and Rhythm: Normal rate and regular rhythm.   Pulmonary:      Effort: Pulmonary effort is normal.      Breath sounds: Normal breath sounds.   Abdominal:      Palpations: Abdomen is soft.      Tenderness: There is no abdominal tenderness.   Musculoskeletal:         General: No deformity.      Comments: Slight increased swelling to left foot/ankle area, no obvious areas of erythema. No pain with passive ROM ankle or foot. Good distal CMS.    Lymphadenopathy:      Cervical: No cervical adenopathy.   Skin:     General: Skin is warm and dry.      Findings: No rash.      Comments: Target like rash on left inner thigh area   Neurological:      Mental Status: He is alert and oriented to person, place, and time. Mental status is at baseline.   Psychiatric:         Mood and Affect: Mood normal.         Behavior: Behavior normal.         Thought Content: Thought content normal.         Judgment: Judgment normal.         ED Course        Procedures               Critical Care time:  none               Results for orders placed or performed during the hospital encounter of 06/13/21 (from the past 24 hour(s))   XR Ankle Left G/E 3 Views    Narrative    PROCEDURE INFORMATION:   Exam: XR Left Ankle   Exam date and time: 6/13/2021 8:45 PM   Age: 71 years old   Clinical indication: Pain; Ankle; Left; Additional info: Pain and swelling     TECHNIQUE:   Imaging protocol: XR  Left ankle.   Views: 3 or more views.     COMPARISON:   No relevant prior studies available.     FINDINGS:   Bones/joints: No evidence of fracture. Small plantar and Achilles calcaneal   spurs.   Soft tissues: Mild soft tissue swelling about the left ankle. There are   multiple soft tissue calcifications overlying the plantar aspect of the left   foot.       Impression    IMPRESSION:   Mild soft tissue swelling about the left ankle.  No evidence of fracture.     THIS DOCUMENT HAS BEEN ELECTRONICALLY SIGNED BY MAGUE BURROUGHS MD   XR Foot Left G/E 3 Views    Narrative    PROCEDURE INFORMATION:   Exam: XR Left Foot   Exam date and time: 6/13/2021 8:45 PM   Age: 71 years old   Clinical indication: Pain; Foot; Left; Additional info: Pain and swelling     TECHNIQUE:   Imaging protocol: XR Left foot.   Views: 3 or more views.     COMPARISON:   No relevant prior studies available.     FINDINGS:   Bones/joints: Mild degenerative arthritis in the left 1st MTP joint. Small   plantar and Achilles calcaneal spurs.   Soft tissues: Multiple soft tissue calcifications overlying the plantar aspect   of the foot. Mild soft tissue swelling about the left forefoot.       Impression    IMPRESSION:   Mild soft tissue swelling about the left forefoot.  No evidence fracture.    THIS DOCUMENT HAS BEEN ELECTRONICALLY SIGNED BY MAGUE BURROUGHS MD   US Lower Extremity Venous Duplex Left    Narrative    PROCEDURE INFORMATION:   Exam: US Duplex Left Lower Extremity Veins, Limited   Exam date and time: 6/13/2021 10:48 PM   Age: 71 years old   Clinical indication: Pain; Foot; Left; Additional info: Left leg swelling, dvt?     TECHNIQUE:   Imaging protocol: Real-time Duplex ultrasound of the Left Lower Extremity with   2-D gray scale, color Doppler flow and spectral waveform analysis with image   documentation. Limited exam focused on the left lower extremity veins.     COMPARISON:   No relevant prior studies available.     FINDINGS:   Left deep veins:  Unremarkable. The common femoral, femoral, proximal profunda   femoral and popliteal veins are patent without thrombus. Normal Doppler   waveforms. Normal compressibility and/or augmentation response.    Left superficial veins: Unremarkable. Saphenofemoral junction is patent without   thrombus.     Soft tissues: Unremarkable.       Impression    IMPRESSION:   No evidence of deep vein thrombosis.     THIS DOCUMENT HAS BEEN ELECTRONICALLY SIGNED BY MAGUE UBRROUGHS MD       Medications   doxycycline hyclate (VIBRAMYCIN) capsule 100 mg (100 mg Oral Given 6/13/21 2251)   ibuprofen (ADVIL/MOTRIN) tablet 600 mg (600 mg Oral Given 6/13/21 2251)       Assessments & Plan (with Medical Decision Making)   Pt nontoxic in NAD. Heart, lung, bowel sounds normal, abd soft, nontender to palpation, nondistended. VSS, afebrile.     He does have Slight increased swelling to left foot/ankle area, no obvious areas of erythema. No pain with passive ROM ankle or foot. Good distal CMS. He also has Target like rash on left inner thigh area.     xrays of ankle and foot read as no fx's, just increase in soft tissue swelling.     US read as no DVT.     Lyme's test pending.     I consider possible gout, septic joint, venous insufficiency, fracture or DVT.  With his reassuring studies and physical exam I have low suspicion for septic joint or DVT.  We will treat him with conservative treatment including rice protocol and Tylenol and ibuprofen.  We will also treat him with doxycycline for possible Lyme's disease.  Referral was placed for him to follow-up with PCP for reassessment.    Strict return precautions are given to the pt, they will return if symptoms are worsening or concerning. The pt understands and agrees with the plan and they are discharged.     Rafael Johnson PA-C    I have reviewed the nursing notes.    I have reviewed the findings, diagnosis, plan and need for follow up with the patient.       Discharge Medication List as of  6/13/2021 11:45 PM      START taking these medications    Details   doxycycline hyclate (VIBRAMYCIN) 100 MG capsule Take 1 capsule (100 mg) by mouth 2 times daily for 14 days, Disp-28 capsule, R-0, E-Prescribe             Final diagnoses:   Swelling of left lower extremity   Erythema migrans (Lyme disease)       6/13/2021   [facility]  06/14/21 1145

## 2021-06-14 NOTE — ED TRIAGE NOTES
ED Nursing Triage Note (General)   ________________________________    Jarad Purvis is a 71 year old Male that presents to triage private car  With history of  Ankle/foot pain reported by patient   Significant symptoms had onset 4 hour(s) ago.  BP (!) 148/76   Pulse 72   Temp 96.7  F (35.9  C) (Tympanic)   Resp 16   Ht 1.829 m (6')   Wt 113.9 kg (251 lb)   SpO2 95%   BMI 34.04 kg/m  t  Patient appears alert , in no acute distress., and cooperative and pleasant behavior.    GCS 15  Airway: intact  Breathing noted as Normal  Circulation Normal  Skin:  Normal  Action taken:  Triage to critical care immediately      PRE HOSPITAL PRIOR LIVING SITUATION Spouse    Patient twisted his right foot when walking and is now having pain and some swelling. It is painful at rest and to walk on. Patient notes he also possibly has a tick bite on his upper thigh on that same leg he would like to have checked out.

## 2021-06-14 NOTE — DISCHARGE INSTRUCTIONS
Get plenty of fluids and rest.  As we discussed there are no fractures found in your leg, no blood clots.  Is difficult to say what is causing the swelling at this time.   I have a low suspicion for infection in your joints.  I would recommend to continue with rest, elevation, compression with an Ace bandage.  You can also use Tylenol and ibuprofen to help with pain and swelling control.  The rashes you are having does seem consistent with possible Lyme's disease.  We will treat you with doxycycline.  Referral was placed for you to follow-up with PCP for reassessment and to go over your blood results.  Return if there are worsening or concerning symptoms.

## 2021-06-15 NOTE — RESULT ENCOUNTER NOTE
Final result for Lyme Disease Ailyn with reflex to WB Serum is NEGATIVE.    No change in treatment per [facility] ED Lab Result Lyme Disease protocol.

## 2021-06-17 ENCOUNTER — OFFICE VISIT (OUTPATIENT)
Dept: FAMILY MEDICINE | Facility: OTHER | Age: 71
End: 2021-06-17
Attending: PHYSICIAN ASSISTANT
Payer: COMMERCIAL

## 2021-06-17 VITALS
BODY MASS INDEX: 34.32 KG/M2 | RESPIRATION RATE: 20 BRPM | WEIGHT: 253.4 LBS | SYSTOLIC BLOOD PRESSURE: 122 MMHG | HEART RATE: 84 BPM | HEIGHT: 72 IN | OXYGEN SATURATION: 95 % | DIASTOLIC BLOOD PRESSURE: 76 MMHG | TEMPERATURE: 97 F

## 2021-06-17 DIAGNOSIS — A69.20 ERYTHEMA MIGRANS (LYME DISEASE): ICD-10-CM

## 2021-06-17 DIAGNOSIS — M79.89 SWELLING OF LEFT LOWER EXTREMITY: ICD-10-CM

## 2021-06-17 PROCEDURE — G0463 HOSPITAL OUTPT CLINIC VISIT: HCPCS

## 2021-06-17 PROCEDURE — 99213 OFFICE O/P EST LOW 20 MIN: CPT | Performed by: FAMILY MEDICINE

## 2021-06-17 ASSESSMENT — MIFFLIN-ST. JEOR: SCORE: 1932.41

## 2021-06-17 ASSESSMENT — PAIN SCALES - GENERAL: PAINLEVEL: NO PAIN (0)

## 2021-06-17 NOTE — NURSING NOTE
Patient here for follow up ED on 06/13/2021 for left lower extremity swelling. He says that the swelling is gone, denies any pain. He is on doxy for Lyme. Medication Reconciliation: complete.    Devi Vivar LPN  6/17/2021 3:23 PM

## 2021-06-18 NOTE — PROGRESS NOTES
SUBJECTIVE:   Jarad Purvis is a 71 year old male who presents to clinic today for the following health issues: Follow-up ER    Patient arrives here for follow-up ER.  He was recently seen in the ER for swelling.  And also Lyme disease.  He is being treated for Lyme disease and reports that edema is markedly improved.  Patient was diagnosed with erythema migrans.  And started on a 2-week course of doxycycline.        Patient Active Problem List    Diagnosis Date Noted     Cerebral palsy (H) 01/25/2018     Priority: Medium     Overview:   right arm flexion contractures       Diverticular disease of colon 01/25/2018     Priority: Medium     Hiatal hernia 01/25/2018     Priority: Medium     Hypertension 01/25/2018     Priority: Medium     Primary thrombocytopenia (H) 01/25/2018     Priority: Medium     Convulsions (H) 01/25/2018     Priority: Medium     Overview:   last seizure age 14  no longer on medications       Sleep apnea 01/25/2018     Priority: Medium     Elevated blood sugar 09/03/2015     Priority: Medium     Hemorrhoids 01/16/2015     Priority: Medium     Incontinence 09/30/2013     Priority: Medium     H/O colonoscopy 10/08/2012     Priority: Medium     Overview:   2008       Corns and callosities 12/12/2011     Priority: Medium     Personal history of other mental disorder 06/03/2010     Priority: Medium       Review of Systems     OBJECTIVE:     /76   Pulse 84   Temp 97  F (36.1  C)   Resp 20   Ht 1.829 m (6')   Wt 114.9 kg (253 lb 6.4 oz)   SpO2 95%   BMI 34.37 kg/m    Body mass index is 34.37 kg/m .  Physical Exam  Constitutional:       Appearance: Normal appearance.   Cardiovascular:      Rate and Rhythm: Normal rate.   Musculoskeletal:      Comments: Trace edema lower extremities   Neurological:      Mental Status: He is alert.         Diagnostic Test Results:  none     ASSESSMENT/PLAN:         1. Swelling of left lower extremity  Improving.  I did caution him on sun exposure and  doxycycline.    2. Erythema migrans (Lyme disease)    - FAMILY PRACTICE REFERRAL      Neal Enriquez MD  [facility] AND [facility] [place]

## 2021-07-08 DIAGNOSIS — I10 ESSENTIAL HYPERTENSION: ICD-10-CM

## 2021-07-08 RX ORDER — HYDROCHLOROTHIAZIDE 25 MG/1
25 TABLET ORAL DAILY
Qty: 90 TABLET | Refills: 1 | Status: SHIPPED | OUTPATIENT
Start: 2021-07-08 | End: 2022-01-07

## 2021-07-08 NOTE — TELEPHONE ENCOUNTER
Prescription refilled per RN Medication Refill Policy..................Celia Collazo RN 7/8/2021 3:05 PM

## 2021-10-08 DIAGNOSIS — N39.490 OVERFLOW INCONTINENCE OF URINE: ICD-10-CM

## 2021-10-08 DIAGNOSIS — I10 ESSENTIAL HYPERTENSION: ICD-10-CM

## 2021-10-11 RX ORDER — PROPRANOLOL HYDROCHLORIDE 40 MG/1
40 TABLET ORAL 2 TIMES DAILY
Qty: 180 TABLET | Refills: 1 | Status: SHIPPED | OUTPATIENT
Start: 2021-10-11 | End: 2022-04-04

## 2021-10-11 RX ORDER — TAMSULOSIN HYDROCHLORIDE 0.4 MG/1
0.4 CAPSULE ORAL DAILY
Qty: 90 CAPSULE | Refills: 1 | Status: SHIPPED | OUTPATIENT
Start: 2021-10-11 | End: 2022-04-04

## 2021-10-11 NOTE — TELEPHONE ENCOUNTER
[facility] sent Rx request for the following:      Requested Prescriptions   Pending Prescriptions Disp Refills     tamsulosin (FLOMAX) 0.4 MG capsule [Pharmacy Med Name: tamsulosin 0.4 mg capsule] 90 capsule 4     Sig: Take 1 capsule (0.4 mg) by mouth daily   Last Prescription Date:   7/14/20  Last Fill Qty/Refills:         90, R-4         propranolol (INDERAL) 40 MG tablet [Pharmacy Med Name: propranolol 40 mg tablet] 180 tablet 4     Sig: Take 1 tablet (40 mg) by mouth 2 times daily   Last Prescription Date:   7/14/20  Last Fill Qty/Refills:         180, R-4      Last Office Visit:              6/17/21  Future Office visit:           None    Unable to complete prescription refill per RN Medication Refill Policy. Caitie Ca RN .............. 10/11/2021  4:10 PM

## 2021-10-12 ENCOUNTER — IMMUNIZATION (OUTPATIENT)
Dept: FAMILY MEDICINE | Facility: OTHER | Age: 71
End: 2021-10-12
Attending: FAMILY MEDICINE
Payer: MEDICARE

## 2021-10-12 PROCEDURE — 91300 PR COVID VAC PFIZER DIL RECON 30 MCG/0.3 ML IM: CPT

## 2022-01-05 DIAGNOSIS — I10 ESSENTIAL HYPERTENSION: ICD-10-CM

## 2022-01-07 RX ORDER — HYDROCHLOROTHIAZIDE 25 MG/1
25 TABLET ORAL DAILY
Qty: 90 TABLET | Refills: 0 | Status: SHIPPED | OUTPATIENT
Start: 2022-01-07 | End: 2022-04-04

## 2022-01-07 NOTE — TELEPHONE ENCOUNTER
[facility] sent Rx request for the following:      Requested Prescriptions   Pending Prescriptions Disp Refills     hydrochlorothiazide (HYDRODIURIL) 25 MG tablet [Pharmacy Med Name: hydrochlorothiazide 25 mg tablet] 90 tablet 1     Sig: Take 1 tablet (25 mg) by mouth daily       Diuretics (Including Combos) Protocol Failed - 1/7/2022  8:58 AM        Failed - Normal serum creatinine on file in past 12 months     Recent Labs   Lab Test 07/14/20  1334   CR 1.07             Last Prescription Date:   7/8/2021  Last Fill Qty/Refills:         90, R-1  Last Office Visit:              6/17/2021   Future Office visit:           None    In clinical absence of patient's primary, Neal Enriquez, patient is requesting that this message be sent to the covering provider for consideration please.    Unable to complete prescription refill per RN Medication Refill Policy.   Kianna Nieves, RN on 1/7/2022 at 9:14 AM

## 2022-03-01 ENCOUNTER — OFFICE VISIT (OUTPATIENT)
Dept: FAMILY MEDICINE | Facility: OTHER | Age: 72
End: 2022-03-01
Attending: FAMILY MEDICINE
Payer: COMMERCIAL

## 2022-03-01 VITALS
RESPIRATION RATE: 20 BRPM | SYSTOLIC BLOOD PRESSURE: 136 MMHG | TEMPERATURE: 98.6 F | HEART RATE: 60 BPM | OXYGEN SATURATION: 97 % | DIASTOLIC BLOOD PRESSURE: 60 MMHG | WEIGHT: 248.6 LBS | BODY MASS INDEX: 33.72 KG/M2

## 2022-03-01 DIAGNOSIS — Z12.10 ENCOUNTER FOR SCREENING FOR MALIGNANT NEOPLASM OF INTESTINAL TRACT, UNSPECIFIED: ICD-10-CM

## 2022-03-01 DIAGNOSIS — K92.1 BLACK STOOLS: Primary | ICD-10-CM

## 2022-03-01 DIAGNOSIS — Z12.11 ENCOUNTER FOR SCREENING FOR MALIGNANT NEOPLASM OF COLON: ICD-10-CM

## 2022-03-01 LAB
BASOPHILS # BLD AUTO: 0 10E3/UL (ref 0–0.2)
BASOPHILS NFR BLD AUTO: 0 %
EOSINOPHIL # BLD AUTO: 0.2 10E3/UL (ref 0–0.7)
EOSINOPHIL NFR BLD AUTO: 3 %
ERYTHROCYTE [DISTWIDTH] IN BLOOD BY AUTOMATED COUNT: 13.1 % (ref 10–15)
HCT VFR BLD AUTO: 45.9 % (ref 40–53)
HEMOCCULT STL QL: NEGATIVE
HGB BLD-MCNC: 15.6 G/DL (ref 13.3–17.7)
IMM GRANULOCYTES # BLD: 0 10E3/UL
IMM GRANULOCYTES NFR BLD: 0 %
LYMPHOCYTES # BLD AUTO: 1.6 10E3/UL (ref 0.8–5.3)
LYMPHOCYTES NFR BLD AUTO: 23 %
MCH RBC QN AUTO: 30.8 PG (ref 26.5–33)
MCHC RBC AUTO-ENTMCNC: 34 G/DL (ref 31.5–36.5)
MCV RBC AUTO: 91 FL (ref 78–100)
MONOCYTES # BLD AUTO: 0.9 10E3/UL (ref 0–1.3)
MONOCYTES NFR BLD AUTO: 14 %
NEUTROPHILS # BLD AUTO: 4.2 10E3/UL (ref 1.6–8.3)
NEUTROPHILS NFR BLD AUTO: 60 %
NRBC # BLD AUTO: 0 10E3/UL
NRBC BLD AUTO-RTO: 0 /100
PLATELET # BLD AUTO: 285 10E3/UL (ref 150–450)
RBC # BLD AUTO: 5.07 10E6/UL (ref 4.4–5.9)
WBC # BLD AUTO: 6.9 10E3/UL (ref 4–11)

## 2022-03-01 PROCEDURE — 85025 COMPLETE CBC W/AUTO DIFF WBC: CPT | Mod: ZL | Performed by: FAMILY MEDICINE

## 2022-03-01 PROCEDURE — 36415 COLL VENOUS BLD VENIPUNCTURE: CPT | Mod: ZL | Performed by: FAMILY MEDICINE

## 2022-03-01 PROCEDURE — 82272 OCCULT BLD FECES 1-3 TESTS: CPT | Mod: ZL | Performed by: FAMILY MEDICINE

## 2022-03-01 PROCEDURE — 99214 OFFICE O/P EST MOD 30 MIN: CPT | Performed by: FAMILY MEDICINE

## 2022-03-01 PROCEDURE — G0463 HOSPITAL OUTPT CLINIC VISIT: HCPCS

## 2022-03-01 ASSESSMENT — PAIN SCALES - GENERAL: PAINLEVEL: NO PAIN (0)

## 2022-03-01 NOTE — PROGRESS NOTES
Assessment & Plan     Black stools    - CBC and Differential; Future  - Occult blood stool; Future  - CBC and Differential  - Occult blood stool  - OBLD3; Future    Encounter for screening for malignant neoplasm of colon     We will check Hemoccult x3.  If positive consider upper and lower scoping.  Discussed plan with patient.  Encounter for screening for malignant neoplasm of intestinal tract, unspecified     - OBLD3; Future             BMI:   Estimated body mass index is 33.72 kg/m  as calculated from the following:    Height as of 6/17/21: 1.829 m (6').    Weight as of this encounter: 112.8 kg (248 lb 9.6 oz).           No follow-ups on file.    Neal Enriquez MD  [facility]    Subjective   Ang is a 71 year old who presents for the following health issues blood in stool for 4 months.     Patient arrives here for black stools on and off for 4 months.  He is change his diet.  Without any improvement.  Seems to come and go.  He does have a hiatal hernia reports a lot of gas.  Not used any excessive medications except for Rolaids.  No Mylanta or milk of magnesia.  No abdominal pain.  Is not tarry but dark.  Usually occurs in the morning.  He is up-to-date on his colon screening.  Recently did have a Cologuard    History of Present Illness     Reason for visit:  Black stools    He eats 2-3 servings of fruits and vegetables daily.He consumes 0 sweetened beverage(s) daily.He exercises with enough effort to increase his heart rate 10 to 19 minutes per day.  He exercises with enough effort to increase his heart rate 3 or less days per week.   He is taking medications regularly.             Review of Systems         Objective    /60   Pulse 60   Temp 98.6  F (37  C)   Resp 20   Wt 112.8 kg (248 lb 9.6 oz)   SpO2 97%   BMI 33.72 kg/m    Body mass index is 33.72 kg/m .  Physical Exam  Constitutional:       Appearance: Normal appearance.   Cardiovascular:      Rate and Rhythm: Normal  rate.   Pulmonary:      Effort: Pulmonary effort is normal.   Abdominal:      General: There is no distension.      Palpations: There is no mass.      Tenderness: There is no abdominal tenderness. There is no guarding.   Neurological:      Mental Status: He is alert.   Psychiatric:         Mood and Affect: Mood normal.        Results for orders placed or performed in visit on 03/01/22   Occult blood stool     Status: Normal   Result Value Ref Range    Occult Blood Negative Negative   CBC with platelets and differential     Status: None   Result Value Ref Range    WBC Count 6.9 4.0 - 11.0 10e3/uL    RBC Count 5.07 4.40 - 5.90 10e6/uL    Hemoglobin 15.6 13.3 - 17.7 g/dL    Hematocrit 45.9 40.0 - 53.0 %    MCV 91 78 - 100 fL    MCH 30.8 26.5 - 33.0 pg    MCHC 34.0 31.5 - 36.5 g/dL    RDW 13.1 10.0 - 15.0 %    Platelet Count 285 150 - 450 10e3/uL    % Neutrophils 60 %    % Lymphocytes 23 %    % Monocytes 14 %    % Eosinophils 3 %    % Basophils 0 %    % Immature Granulocytes 0 %    NRBCs per 100 WBC 0 <1 /100    Absolute Neutrophils 4.2 1.6 - 8.3 10e3/uL    Absolute Lymphocytes 1.6 0.8 - 5.3 10e3/uL    Absolute Monocytes 0.9 0.0 - 1.3 10e3/uL    Absolute Eosinophils 0.2 0.0 - 0.7 10e3/uL    Absolute Basophils 0.0 0.0 - 0.2 10e3/uL    Absolute Immature Granulocytes 0.0 <=0.4 10e3/uL    Absolute NRBCs 0.0 10e3/uL   CBC and Differential     Status: None    Narrative    The following orders were created for panel order CBC and Differential.  Procedure                               Abnormality         Status                     ---------                               -----------         ------                     CBC with platelets and d...[[id number]]                      Final result                 Please view results for these tests on the individual orders.     Well

## 2022-03-01 NOTE — NURSING NOTE
Patient here for black stools on and off for the past 4 months. He says it goes away with diet change. Medication Reconciliation: complete.    Devi Vivar LPN  3/1/2022 11:20 AM

## 2022-03-11 ENCOUNTER — LAB (OUTPATIENT)
Dept: LAB | Facility: OTHER | Age: 72
End: 2022-03-11
Attending: FAMILY MEDICINE
Payer: MEDICARE

## 2022-03-11 DIAGNOSIS — K92.1 BLACK STOOLS: ICD-10-CM

## 2022-03-11 DIAGNOSIS — Z12.10 ENCOUNTER FOR SCREENING FOR MALIGNANT NEOPLASM OF INTESTINAL TRACT, UNSPECIFIED: ICD-10-CM

## 2022-03-11 LAB
HEMOCCULT SP1 STL QL: NEGATIVE
HEMOCCULT SP2 STL QL: NEGATIVE
HEMOCCULT SP3 STL QL: NEGATIVE

## 2022-03-11 PROCEDURE — 82270 OCCULT BLOOD FECES: CPT | Mod: ZL

## 2022-04-01 DIAGNOSIS — I10 ESSENTIAL HYPERTENSION: Primary | ICD-10-CM

## 2022-04-01 DIAGNOSIS — N39.490 OVERFLOW INCONTINENCE OF URINE: ICD-10-CM

## 2022-04-04 RX ORDER — TAMSULOSIN HYDROCHLORIDE 0.4 MG/1
0.4 CAPSULE ORAL DAILY
Qty: 90 CAPSULE | Refills: 3 | Status: SHIPPED | OUTPATIENT
Start: 2022-04-04 | End: 2023-04-05

## 2022-04-04 RX ORDER — PROPRANOLOL HYDROCHLORIDE 40 MG/1
40 TABLET ORAL 2 TIMES DAILY
Qty: 180 TABLET | Refills: 3 | Status: SHIPPED | OUTPATIENT
Start: 2022-04-04 | End: 2022-08-29

## 2022-04-04 RX ORDER — HYDROCHLOROTHIAZIDE 25 MG/1
25 TABLET ORAL DAILY
Qty: 90 TABLET | Refills: 3 | Status: SHIPPED | OUTPATIENT
Start: 2022-04-04 | End: 2023-04-05

## 2022-04-04 NOTE — TELEPHONE ENCOUNTER
[facility] sent Rx request for the following:      Requested Prescriptions   Pending Prescriptions Disp Refills     propranolol (INDERAL) 40 MG tablet [Pharmacy Med Name: propranolol 40 mg tablet] 180 tablet 1     Sig: Take 1 tablet (40 mg) by mouth 2 times daily   Last Prescription Date:   10/11/21  Last Fill Qty/Refills:         180, R-1         tamsulosin (FLOMAX) 0.4 MG capsule [Pharmacy Med Name: tamsulosin 0.4 mg capsule] 90 capsule 1     Sig: Take 1 capsule (0.4 mg) by mouth daily   Last Prescription Date:   10/11/21  Last Fill Qty/Refills:         90, R-1         hydrochlorothiazide (HYDRODIURIL) 25 MG tablet [Pharmacy Med Name: hydrochlorothiazide 25 mg tablet] 90 tablet 0     Sig: Take 1 tablet (25 mg) by mouth daily   Last Prescription Date:   1/7/22  Last Fill Qty/Refills:         90, R-0      Diuretics (Including Combos) Protocol Failed - 4/4/2022 10:04 AM        Failed - Normal serum creatinine on file in past 12 months     Recent Labs   Lab Test 07/14/20  1334   CR 1.07           Failed - Normal serum potassium on file in past 12 months     Recent Labs   Lab Test 07/14/20  1334   POTASSIUM 4.1           Failed - Normal serum sodium on file in past 12 months     Recent Labs   Lab Test 07/14/20  1334           Last Office Visit:              3/1/22   Future Office visit:           None    This encounter is also being routed to the Outreach Appointment Request pool to contact the patient upon provider approval of lab orders.     Caitie Ca RN .............. 4/4/2022  10:07 AM

## 2022-04-06 NOTE — TELEPHONE ENCOUNTER
[facility]    Yessy Valdez on 4/6/2022 at 2:20 PM

## 2022-04-12 ENCOUNTER — OFFICE VISIT (OUTPATIENT)
Dept: FAMILY MEDICINE | Facility: OTHER | Age: 72
End: 2022-04-12
Attending: FAMILY MEDICINE
Payer: COMMERCIAL

## 2022-04-12 VITALS
BODY MASS INDEX: 33.86 KG/M2 | HEART RATE: 58 BPM | TEMPERATURE: 97.3 F | WEIGHT: 250 LBS | OXYGEN SATURATION: 96 % | SYSTOLIC BLOOD PRESSURE: 132 MMHG | RESPIRATION RATE: 20 BRPM | DIASTOLIC BLOOD PRESSURE: 64 MMHG | HEIGHT: 72 IN

## 2022-04-12 DIAGNOSIS — Z00.00 ENCOUNTER FOR MEDICARE ANNUAL WELLNESS EXAM: Primary | ICD-10-CM

## 2022-04-12 DIAGNOSIS — Z23 ENCOUNTER FOR IMMUNIZATION: ICD-10-CM

## 2022-04-12 DIAGNOSIS — I10 PRIMARY HYPERTENSION: ICD-10-CM

## 2022-04-12 LAB
ALBUMIN SERPL-MCNC: 4 G/DL (ref 3.5–5.7)
ALP SERPL-CCNC: 58 U/L (ref 34–104)
ALT SERPL W P-5'-P-CCNC: 18 U/L (ref 7–52)
ANION GAP SERPL CALCULATED.3IONS-SCNC: 5 MMOL/L (ref 3–14)
AST SERPL W P-5'-P-CCNC: 20 U/L (ref 13–39)
BILIRUB SERPL-MCNC: 0.4 MG/DL (ref 0.3–1)
BUN SERPL-MCNC: 14 MG/DL (ref 7–25)
CALCIUM SERPL-MCNC: 9.8 MG/DL (ref 8.6–10.3)
CHLORIDE BLD-SCNC: 100 MMOL/L (ref 98–107)
CO2 SERPL-SCNC: 32 MMOL/L (ref 21–31)
CREAT SERPL-MCNC: 0.83 MG/DL (ref 0.7–1.3)
GFR SERPL CREATININE-BSD FRML MDRD: >90 ML/MIN/1.73M2
GLUCOSE BLD-MCNC: 79 MG/DL (ref 70–105)
POTASSIUM BLD-SCNC: 4 MMOL/L (ref 3.5–5.1)
PROT SERPL-MCNC: 7.5 G/DL (ref 6.4–8.9)
SODIUM SERPL-SCNC: 137 MMOL/L (ref 134–144)

## 2022-04-12 PROCEDURE — 90732 PPSV23 VACC 2 YRS+ SUBQ/IM: CPT

## 2022-04-12 PROCEDURE — 80053 COMPREHEN METABOLIC PANEL: CPT | Mod: ZL | Performed by: FAMILY MEDICINE

## 2022-04-12 PROCEDURE — 36415 COLL VENOUS BLD VENIPUNCTURE: CPT | Mod: ZL | Performed by: FAMILY MEDICINE

## 2022-04-12 PROCEDURE — G0439 PPPS, SUBSEQ VISIT: HCPCS | Performed by: FAMILY MEDICINE

## 2022-04-12 ASSESSMENT — PAIN SCALES - GENERAL: PAINLEVEL: NO PAIN (0)

## 2022-04-12 NOTE — PROGRESS NOTES
"SUBJECTIVE:   Jarad Purvis is a 71 year old male who presents for Preventive Visit.      Patient has been advised of split billing requirements and indicates understanding: Yes  Are you in the first 12 months of your Medicare Part B coverage?  No    Physical Health:    In general, how would you rate your overall physical health? good    Outside of work, how many days during the week do you exercise? 6-7 days/week    Outside of work, approximately how many minutes a day do you exercise?less than 15 minutes    If you drink alcohol do you typically have >3 drinks per day or >7 drinks per week? No    Do you usually eat at least 4 servings of fruit and vegetables a day, include whole grains & fiber and avoid regularly eating high fat or \"junk\" foods? Yes    Do you have any problems taking medications regularly?  No    Do you have any side effects from medications? none    Needs assistance for the following daily activities: no assistance needed    Which of the following safety concerns are present in your home?  lack of grab bars in the bathroom     Hearing impairment: Yes, Difficulty following a conversation in a noisy restaurant or crowded room.    In the past 6 months, have you been bothered by leaking of urine? yes    Mental Health:    In general, how would you rate your overall mental or emotional health? good  PHQ-2 Score: 0    Do you feel safe in your environment? Yes    Have you ever done Advance Care Planning? (For example, a Health Directive, POLST, or a discussion with a medical provider or your loved ones about your wishes): Yes, patient states has an Advance Care Planning document and will bring a copy to the clinic.    Additional concerns to address?      Fall risk:  Fallen 2 or more times in the past year?: No  Any fall with injury in the past year?: No    Cognitive Screenin) Repeat 3 items (Leader, Season, Table)    2) Clock draw: NORMAL  3) 3 item recall: Recalls 1 object   Results: ABNORMAL " clock, 1-2 items recalled: PROBABLE COGNITIVE IMPAIRMENT, **INFORM PROVIDER**    Mini-CogTM Copyright LESLEY Humphreys. Licensed by the author for use in [facility]; reprinted with permission ([email or web address]). All rights reserved.      Do you have sleep apnea, excessive snoring or daytime drowsiness?: no            Reviewed and updated as needed this visit by clinical staff   Tobacco  Allergies  Meds                Reviewed and updated as needed this visit by Provider                   Social History     Tobacco Use     Smoking status: Former Smoker     Quit date: 10/8/1985     Years since quittin.5     Smokeless tobacco: Former User   Substance Use Topics     Alcohol use: Yes     Alcohol/week: 2.0 standard drinks     Comment: occasional                           Current providers sharing in care for this patient include:   Patient Care Team:  Neal Enriquez MD as PCP - General (Family Practice)  Neal Enriquez MD as Assigned PCP    The following health maintenance items are reviewed in Epic and correct as of today:  Health Maintenance   Topic Date Due     HEPATITIS C SCREENING  Never done     Pneumococcal Vaccine: 65+ Years (1 of 1 - PPSV23) Never done     INFLUENZA VACCINE (1) 2021     MEDICARE ANNUAL WELLNESS VISIT  2023     FALL RISK ASSESSMENT  2023     COLORECTAL CANCER SCREENING  2023     ADVANCE CARE PLANNING  2025     LIPID  2025     DTAP/TDAP/TD IMMUNIZATION (2 - Td or Tdap) 2030     PHQ-2 (once per calendar year)  Completed     ZOSTER IMMUNIZATION  Completed     AORTIC ANEURYSM SCREENING (SYSTEM ASSIGNED)  Completed     COVID-19 Vaccine  Completed     IPV IMMUNIZATION  Aged Out     MENINGITIS IMMUNIZATION  Aged Out     HEPATITIS B IMMUNIZATION  Aged Out     Lab work is in process  Pneumonia Vaccine:Adults age 65+ who received Pneumovax (PPSV23) at 65 years or older: Should be given PCV13 > 1 year after their most recent  PPSV23    ROS:      OBJECTIVE:   /64   Pulse 58   Temp 97.3  F (36.3  C)   Resp 20   Ht 1.829 m (6')   Wt 113.4 kg (250 lb)   SpO2 96%   BMI 33.91 kg/m   Estimated body mass index is 33.91 kg/m  as calculated from the following:    Height as of this encounter: 1.829 m (6').    Weight as of this encounter: 113.4 kg (250 lb).  EXAM:       Diagnostic Test Results:  Labs reviewed in Epic    ASSESSMENT / PLAN:   (Z00.00) Encounter for Medicare annual wellness exam  (primary encounter diagnosis)  Comment:   Plan: GH IMM-  PNEUMOCOCCAL VACCINE,ADULT,SQ OR IM            (Z23) Encounter for immunization   Comment: UpdatedPlan: GH IMM-  PNEUMOCOCCAL VACCINE,ADULT,SQ OR IM            (I10) Primary hypertension  Comment: Currently under good control  Plan: Comprehensive Metabolic Panel              Patient has been advised of split billing requirements and indicates understanding: Yes    COUNSELING:  Reviewed preventive health counseling, as reflected in patient instructions       Immunizations    Vaccinated for: Pneumococcal             Consider lung cancer screening for ages 55-80 years (77 for Medicare) and 20 pack-year smoking history   not indicated greater than 15 years since he quit.    Estimated body mass index is 33.91 kg/m  as calculated from the following:    Height as of this encounter: 1.829 m (6').    Weight as of this encounter: 113.4 kg (250 lb).        He reports that he quit smoking about 36 years ago. He has quit using smokeless tobacco.    Appropriate preventive services were discussed with this patient, including applicable screening as appropriate for cardiovascular disease, diabetes, osteopenia/osteoporosis, and glaucoma.  As appropriate for age/gender, discussed screening for colorectal cancer, prostate cancer, breast cancer, and cervical cancer. Checklist reviewing preventive services available has been given to the patient.    Reviewed patients plan of care and provided an AVS. The Basic  Care Plan (routine screening as documented in Health Maintenance) for Jarad meets the Care Plan requirement. This Care Plan has been established and reviewed with the Patient.    Counseling Resources:  ATP IV Guidelines  Pooled Cohorts Equation Calculator  Breast Cancer Risk Calculator  BRCA-Related Cancer Risk Assessment: FHS-7 Tool  FRAX Risk Assessment  ICSI Preventive Guidelines  Dietary Guidelines for Americans, 2010  USDA's MyPlate  ASA Prophylaxis  Lung CA Screening    Neal Enriquez MD  [facility] AND \[facility] [place] [facility]\""

## 2022-04-12 NOTE — PATIENT INSTRUCTIONS
Patient Education   Personalized Prevention Plan  You are due for the preventive services outlined below.  Your care team is available to assist you in scheduling these services.  If you have already completed any of these items, please share that information with your care team to update in your medical record.  Health Maintenance Due   Topic Date Due     Hepatitis C Screening  Never done     Pneumococcal Vaccine (1 of 1 - PPSV23) Never done     Flu Vaccine (1) 09/01/2021

## 2022-04-15 NOTE — PROGRESS NOTES
Review current opioid prescriptions    For a patient with a current opioid prescription:    Reviewed potential Opioid Use Disorder (OUD) risk factors: No patient not on opiates    Evaluate their pain severity and current treatment plan:     Provide information on non-opioid treatment options:    Refer to a specialist, as appropriate:    Get more information on pain management in the [facility] Pain Management Best Practices Inter-agency Task Force Report    Screen for potential Substance Use Disorders (SUDs)    Reviewed the patient s potential risk factors for SUDs: No patient not on opiates    Refer to treatment or specialist, as appropriate:     A screening tool isn t required but you may use one:  Find more information in the National [place] on Drug Abuse Screening and Assessment Tools Chart

## 2022-05-11 ENCOUNTER — OFFICE VISIT (OUTPATIENT)
Dept: FAMILY MEDICINE | Facility: OTHER | Age: 72
End: 2022-05-11
Attending: NURSE PRACTITIONER
Payer: COMMERCIAL

## 2022-05-11 VITALS
OXYGEN SATURATION: 94 % | DIASTOLIC BLOOD PRESSURE: 88 MMHG | HEIGHT: 72 IN | HEART RATE: 80 BPM | SYSTOLIC BLOOD PRESSURE: 150 MMHG | TEMPERATURE: 99.5 F | WEIGHT: 244.9 LBS | RESPIRATION RATE: 20 BRPM | BODY MASS INDEX: 33.17 KG/M2

## 2022-05-11 DIAGNOSIS — J06.9 VIRAL URI WITH COUGH: Primary | ICD-10-CM

## 2022-05-11 PROCEDURE — 99213 OFFICE O/P EST LOW 20 MIN: CPT | Performed by: NURSE PRACTITIONER

## 2022-05-11 PROCEDURE — G0463 HOSPITAL OUTPT CLINIC VISIT: HCPCS

## 2022-05-11 ASSESSMENT — PAIN SCALES - GENERAL: PAINLEVEL: NO PAIN (0)

## 2022-05-11 NOTE — NURSING NOTE
Chief Complaint   Patient presents with     Cough     Fever sneezing   started yesterday       Medication reconciliation completed.    FOOD SECURITY SCREENING QUESTIONS:    The next two questions are to help us understand your food security.  If you are feeling you need any assistance in this area, we have resources available to support you today.    Hunger Vital Signs:  Within the past 12 months we worried whether our food would run out before we got money to buy more. Never  Within the past 12 months the food we bought just didn't last and we didn't have money to get more. Never    Initial BP (!) 150/88 (BP Location: Left arm, Patient Position: Sitting, Cuff Size: Adult Regular)   Pulse 80   Temp 99.5  F (37.5  C) (Tympanic)   Resp 20   Ht 1.829 m (6')   Wt 111.1 kg (244 lb 14.4 oz)   SpO2 94%   BMI 33.21 kg/m   Estimated body mass index is 33.21 kg/m  as calculated from the following:    Height as of this encounter: 1.829 m (6').    Weight as of this encounter: 111.1 kg (244 lb 14.4 oz).       Joanna Viera LPN .......  5/11/2022  11:04 AM

## 2022-05-11 NOTE — PROGRESS NOTES
ASSESSMENT/PLAN:     I have reviewed the nursing notes.  I have reviewed the findings, diagnosis, plan and need for follow up with the patient.    1. Viral URI with cough    Symptoms started yesterday, consistent with URI.  Discussed with patient that symptoms and exam are consistent with viral illness.    No clinical indications for antibiotic treatment at this time.    Patient declines Covid and Influenza testing.  Patient declines antiviral treatment.    Symptomatic treatment - Encouraged fluids, salt water gargles, honey, elevation, humidifier, saline nasal spray, sinus rinse/netti pot, lozenges, tea, soup, smoothies, popsicles, topical vapor rub, rest, etc   May use over the counter cough/cold medication PRN  May use over-the-counter Tylenol or ibuprofen PRN    Discussed warning signs/symptoms indicative of need to f/u  Follow up if symptoms persist or worsen or concerns      I explained my diagnostic considerations and recommendations to the patient, who voiced understanding and agreement with the treatment plan. All questions were answered. We discussed potential side effects of any prescribed or recommended therapies, as well as expectations for response to treatments.    Stella Diaz NP  [facility]      SUBJECTIVE:   Jarad Purvis is a 71 year old male who presents to clinic today for the following health issues:  Cough    HPI  Cough and, nasal congestion started yesterday.  No chest tightness or heaviness.  Occasionally able to cough up phlegm.  Shortness of breath with exertion. No fever yesterday.  Today low grade of 99.5.   No chills or sweats.  Mild hoarse voice.  Sneezing.  No sinus pressure.    No headaches.  No body aches.  Appetite at baseline, still eating every meal.  No nausea or vomiting.  Mild loose stools.  Energy decreased.    States he treats his allergies with Vicks, nasal spray, and hot compresses.    Taking Nyquil and Dayquil yesterday, none today.        No  past medical history on file.  Past Surgical History:   Procedure Laterality Date     CHOLECYSTECTOMY      No Comments Provided     COLONOSCOPY  2010     ESOPHAGOSCOPY, GASTROSCOPY, DUODENOSCOPY (EGD), COMBINED           EXTRACAPSULAR CATARACT EXTRATION WITH INTRAOCULAR LENS IMPLANT      rt     OTHER SURGICAL HISTORY      29994.1,[place] LAP VENTRAL INCISIONAL HERNIORRAPHY,with mesh     OTHER SURGICAL HISTORY      49379.0,[place] FUSION OF WRIST JOINT     OTHER SURGICAL HISTORY      [id number],OTHER     OTHER SURGICAL HISTORY      [id number],BONE MARROW ASPIRATION     Social History     Tobacco Use     Smoking status: Former Smoker     Quit date: 10/8/1985     Years since quittin.6     Smokeless tobacco: Former User   Substance Use Topics     Alcohol use: Yes     Alcohol/week: 2.0 standard drinks     Comment: occasional     Current Outpatient Medications   Medication Sig Dispense Refill     hydrochlorothiazide (HYDRODIURIL) 25 MG tablet Take 1 tablet (25 mg) by mouth daily 90 tablet 3     Misc Natural Products (MENS PROSTATE HEALTH FORMULA PO) Take 1 capsule by mouth daily       Multiple Vitamins-Minerals (MULTI-VITAMIN/MINERALS) TABS Take 1 tablet by mouth daily       order for DME Equipment being ordered: CPAP and supplies 1 Units 0     potassium 99 MG TABS        propranolol (INDERAL) 40 MG tablet Take 1 tablet (40 mg) by mouth 2 times daily 180 tablet 3     Sharps Container (SHARPS-A-GATOR LOCKING BRACKET) MISC CPAP, heated humidifier, mask, headgear, filters and tubing. For home use. Pressure: ?  Length of Need: lifetime       tamsulosin (FLOMAX) 0.4 MG capsule Take 1 capsule (0.4 mg) by mouth daily 90 capsule 3     Allergies   Allergen Reactions     Phenobarbital Hives         Past medical history, past surgical history, current medications and allergies reviewed and accurate to the best of my knowledge.        OBJECTIVE:     BP (!) 150/88 (BP Location: Left arm, Patient Position: Sitting, Cuff Size: Adult  Regular)   Pulse 80   Temp 99.5  F (37.5  C) (Tympanic)   Resp 20   Ht 1.829 m (6')   Wt 111.1 kg (244 lb 14.4 oz)   SpO2 94%   BMI 33.21 kg/m    Body mass index is 33.21 kg/m .     Physical Exam  General Appearance: Well appearing young elderly male, appropriate appearance for age. No acute distress  Orophayrnx: moist mucous membranes, pharynx without erythema, tonsils with 2+ hypertrophy, tonsils without erythema, no tonsillar exudates, no oral lesions, no palate petechiae, no post nasal drip seen, no trismus, voice clear.    Sinuses:  No sinus tenderness upon palpation of the frontal or maxillary sinuses  Nose:  Drainage and congestion present   Neck: supple without adenopathy  Respiratory: normal chest wall and respirations.  Normal effort.  Clear to auscultation bilaterally, no wheezing, crackles or rhonchi.  No increased work of breathing.  Mild congested cough appreciated.  Cardiac: RRR with no murmurs  Musculoskeletal:  Equal movement of bilateral upper extremities.  Equal movement of bilateral lower extremities.  Normal gait.    Psychological: normal affect, alert, oriented, and pleasant.

## 2022-08-10 ENCOUNTER — PATIENT OUTREACH (OUTPATIENT)
Dept: FAMILY MEDICINE | Facility: OTHER | Age: 72
End: 2022-08-10

## 2022-08-10 NOTE — TELEPHONE ENCOUNTER
Patient Quality Outreach    Patient is due for the following:   Hypertension -  BP check and Hypertension follow-up visit    Next Steps:   Schedule a nurse only visit for blood pressure check and office visit for blood pressure follow-up    Type of outreach:    Sent letter.      Questions for provider review:    None     Saige Joseph

## 2022-08-10 NOTE — LETTER
[facility]  [address]  [place] [id number] [phone]       August 10, 2022    Jarad Purvis  [address]  [place] [address]    Dear Ang,    We care about your health and have reviewed your health plan and are making recommendations based on this review, to optimize your health.    You are in particular need of attention regarding:  -High Blood Pressure    We are recommending that you:  -schedule a NURSE-ONLY BLOOD PRESSURE APPOINTMENT within the next 1-4 weeks.    -schedule a FOLLOWUP OFFICE APPOINTMENT with me.        In addition, here is a list of due or overdue Health Maintenance reminders.    Health Maintenance Due   Topic Date Due     Hepatitis C Screening  Never done     COVID-19 Vaccine (4 - Booster for Pfizer series) 02/12/2022       To address the above recommendations, we encourage you to contact us at [phone] or via [employer]. They will assist you with finding the most convenient time and location.    Thank you for trusting [facility] and we appreciate the opportunity to serve you.  We look forward to supporting your healthcare needs in the future.    Healthy Regards,    [facility] Team

## 2022-08-15 ENCOUNTER — ALLIED HEALTH/NURSE VISIT (OUTPATIENT)
Dept: FAMILY MEDICINE | Facility: OTHER | Age: 72
End: 2022-08-15
Attending: FAMILY MEDICINE
Payer: MEDICARE

## 2022-08-15 VITALS — DIASTOLIC BLOOD PRESSURE: 95 MMHG | SYSTOLIC BLOOD PRESSURE: 141 MMHG

## 2022-08-15 DIAGNOSIS — Z01.30 BP CHECK: Primary | ICD-10-CM

## 2022-08-15 NOTE — PROGRESS NOTES
I met with Jarad Purvis at the request of DR. ENRIQUEZ to recheck his blood pressure.  Blood pressure medications on the med list were reviewed with patient.    Patient has taken all medications as per usual regimen: Yes  Patient reports tolerating them without any issues or concerns: Yes    Vitals:    08/15/22 1446 08/15/22 1450   BP: (!) 151/90 (!) 141/95   BP Location: Left arm Left arm   Patient Position: Sitting Sitting   Cuff Size: Adult Large Adult Large       After 5 minutes, the patient's blood pressure remained greater than or equal to 140/90.    Is the patient currently having any chest pain? No  Does the patient currently have a headache? No  Does the patient currently have any vision changes? No  Does the patient currently have any nausea? No  Does the patient currently have any abdominal pain? No    Patient was instructed to make follow up visit with PCP, per Dr. Enriquez. Pt advised to make appointment today and was shown to the check out window     Kianna Nieves RN on 8/15/2022 at 2:54 PM

## 2022-08-29 ENCOUNTER — OFFICE VISIT (OUTPATIENT)
Dept: FAMILY MEDICINE | Facility: OTHER | Age: 72
End: 2022-08-29
Attending: FAMILY MEDICINE
Payer: MEDICARE

## 2022-08-29 VITALS
HEIGHT: 69 IN | OXYGEN SATURATION: 99 % | RESPIRATION RATE: 18 BRPM | SYSTOLIC BLOOD PRESSURE: 120 MMHG | WEIGHT: 244 LBS | DIASTOLIC BLOOD PRESSURE: 90 MMHG | BODY MASS INDEX: 36.14 KG/M2 | TEMPERATURE: 97 F | HEART RATE: 66 BPM

## 2022-08-29 DIAGNOSIS — Z13.9 SCREENING FOR CONDITION: ICD-10-CM

## 2022-08-29 DIAGNOSIS — E66.01 MORBID OBESITY (H): ICD-10-CM

## 2022-08-29 DIAGNOSIS — I10 ESSENTIAL HYPERTENSION: Primary | ICD-10-CM

## 2022-08-29 DIAGNOSIS — Z23 HIGH PRIORITY FOR 2019-NCOV VACCINE: ICD-10-CM

## 2022-08-29 PROCEDURE — G0463 HOSPITAL OUTPT CLINIC VISIT: HCPCS | Mod: 25

## 2022-08-29 PROCEDURE — 0054A COVID-19,PF,PFIZER (12+ YRS): CPT

## 2022-08-29 PROCEDURE — 99213 OFFICE O/P EST LOW 20 MIN: CPT | Performed by: FAMILY MEDICINE

## 2022-08-29 PROCEDURE — 36415 COLL VENOUS BLD VENIPUNCTURE: CPT | Mod: ZL | Performed by: FAMILY MEDICINE

## 2022-08-29 PROCEDURE — G0463 HOSPITAL OUTPT CLINIC VISIT: HCPCS

## 2022-08-29 PROCEDURE — 86803 HEPATITIS C AB TEST: CPT | Mod: ZL | Performed by: FAMILY MEDICINE

## 2022-08-29 RX ORDER — LORATADINE 10 MG/1
10 TABLET ORAL DAILY
COMMUNITY

## 2022-08-29 RX ORDER — VIT C/E/ZN/COPPR/LUTEIN/ZEAXAN 60 MG-6 MG
1 CAPSULE ORAL DAILY
COMMUNITY

## 2022-08-29 RX ORDER — AMLODIPINE BESYLATE 5 MG/1
5 TABLET ORAL DAILY
Qty: 90 TABLET | Refills: 4 | Status: SHIPPED | OUTPATIENT
Start: 2022-08-29 | End: 2023-08-28

## 2022-08-29 ASSESSMENT — PAIN SCALES - GENERAL: PAINLEVEL: NO PAIN (0)

## 2022-08-29 NOTE — NURSING NOTE
"Chief Complaint   Patient presents with     RECHECK     Here to follow up on his BP   He is here to follow up on his BP as it has been elevate the past 2 times he was seen in the clinic.  Shawna Park LPN..................8/29/2022   1:23 PM      Initial BP (!) 144/96   Pulse 66   Temp 97  F (36.1  C) (Temporal)   Resp 18   Ht 1.759 m (5' 9.25\")   Wt 110.7 kg (244 lb)   SpO2 99%   BMI 35.77 kg/m   Estimated body mass index is 35.77 kg/m  as calculated from the following:    Height as of this encounter: 1.759 m (5' 9.25\").    Weight as of this encounter: 110.7 kg (244 lb).  Medication Reconciliation: complete    FOOD SECURITY SCREENING QUESTIONS  Hunger Vital Signs:  Within the past 12 months we worried whether our food would run out before we got money to buy more. Never  Within the past 12 months the food we bought just didn't last and we didn't have money to get more. Never        Advance care directive on file? yes      Shawna Park LPN  "

## 2022-08-29 NOTE — PROGRESS NOTES
"  Assessment & Plan     Essential hypertension  Add amlodipine  - amLODIPine (NORVASC) 5 MG tablet; Take 1 tablet (5 mg) by mouth daily    Morbid obesity (H)  Discussed his blood pressure can improve with weight loss    Screening for condition  Gap care is updated  - Hepatitis C Screen Reflex to HCV RNA Quant and Genotype; Future  - Hepatitis C Screen Reflex to HCV RNA Quant and Genotype    High priority for 2019-nCoV vaccine    - COVID-19,PF,PFIZER (12+ Yrs GRAY LABEL)             BMI:   Estimated body mass index is 35.77 kg/m  as calculated from the following:    Height as of this encounter: 1.759 m (5' 9.25\").    Weight as of this encounter: 110.7 kg (244 lb).           No follow-ups on file.    Neal Enriquez MD  [facility]    Subjective   Ang is a 72 year old accompanied by his spouse, presenting for the following health issues:  RECHECK (Here to follow up on his BP)      Patient arrives here for follow-up blood pressure up.  He also has a number of other questions.  His blood pressure at home systolics have been normal diastolics in the low 90s.    History of Present Illness       Hypertension: He presents for follow up of hypertension.  He does not check blood pressure  regularly outside of the clinic. Outpatient blood pressures have not been over 140/90. He does not follow a low salt diet.               Review of Systems         Objective    BP (!) 144/96   Pulse 66   Temp 97  F (36.1  C) (Temporal)   Resp 18   Ht 1.759 m (5' 9.25\")   Wt 110.7 kg (244 lb)   SpO2 99%   BMI 35.77 kg/m    Body mass index is 35.77 kg/m .  Physical Exam  Constitutional:       Appearance: Normal appearance.   Pulmonary:      Effort: Pulmonary effort is normal.      Breath sounds: Normal breath sounds.   Neurological:      Mental Status: He is alert.   Psychiatric:         Mood and Affect: Mood normal.                            .  ..  "

## 2022-08-30 LAB — HCV AB SERPL QL IA: NONREACTIVE

## 2022-09-03 ENCOUNTER — HOSPITAL ENCOUNTER (EMERGENCY)
Facility: OTHER | Age: 72
Discharge: HOME | End: 2022-09-03
Attending: FAMILY MEDICINE | Admitting: FAMILY MEDICINE
Payer: MEDICARE

## 2022-09-03 VITALS
DIASTOLIC BLOOD PRESSURE: 85 MMHG | WEIGHT: 244 LBS | HEIGHT: 72 IN | OXYGEN SATURATION: 95 % | RESPIRATION RATE: 20 BRPM | BODY MASS INDEX: 33.05 KG/M2 | HEART RATE: 67 BPM | TEMPERATURE: 97.7 F | SYSTOLIC BLOOD PRESSURE: 131 MMHG

## 2022-09-03 DIAGNOSIS — M62.838 NECK MUSCLE SPASM: ICD-10-CM

## 2022-09-03 PROCEDURE — 99283 EMERGENCY DEPT VISIT LOW MDM: CPT | Performed by: FAMILY MEDICINE

## 2022-09-03 PROCEDURE — 99284 EMERGENCY DEPT VISIT MOD MDM: CPT | Performed by: FAMILY MEDICINE

## 2022-09-03 PROCEDURE — 96372 THER/PROPH/DIAG INJ SC/IM: CPT | Performed by: FAMILY MEDICINE

## 2022-09-03 PROCEDURE — 250N000011 HC RX IP 250 OP 636: Performed by: FAMILY MEDICINE

## 2022-09-03 RX ORDER — KETOROLAC TROMETHAMINE 15 MG/ML
15 INJECTION, SOLUTION INTRAMUSCULAR; INTRAVENOUS ONCE
Status: COMPLETED | OUTPATIENT
Start: 2022-09-03 | End: 2022-09-03

## 2022-09-03 RX ADMIN — KETOROLAC TROMETHAMINE 15 MG: 15 INJECTION, SOLUTION INTRAMUSCULAR; INTRAVENOUS at 05:04

## 2022-09-03 ASSESSMENT — ACTIVITIES OF DAILY LIVING (ADL): ADLS_ACUITY_SCORE: 35

## 2022-09-03 ASSESSMENT — ENCOUNTER SYMPTOMS: NECK PAIN: 1

## 2022-09-03 NOTE — ED PROVIDER NOTES
History     Chief Complaint   Patient presents with     Neck Pain     The history is provided by the patient and the spouse.     Jarad Purvis is a 72 year old male here with a left sided neck spasm. He went to bed with some neck pain and took ibuprofen. He woke up at about 0200 with worsening neck pain and took some Tylenol. Now the pain is worse and he has pain 7 or 8 of 10 on the left side of his neck. He says this is different. He is worried he is having a stroke. He has been to a chiropractor in the past and they would massage his neck, which usually helped.    He has a history of CP with right hand palsy and a history of neck spasm.     Allergies:  Allergies   Allergen Reactions     Phenobarbital Hives       Problem List:    Patient Active Problem List    Diagnosis Date Noted     Morbid obesity (H) 08/29/2022     Priority: Medium     Cerebral palsy (H) 01/25/2018     Priority: Medium     Overview:   right arm flexion contractures       Diverticular disease of colon 01/25/2018     Priority: Medium     Hiatal hernia 01/25/2018     Priority: Medium     Hypertension 01/25/2018     Priority: Medium     Primary thrombocytopenia (H) 01/25/2018     Priority: Medium     Convulsions (H) 01/25/2018     Priority: Medium     Overview:   last seizure age 14  no longer on medications       Sleep apnea 01/25/2018     Priority: Medium     Elevated blood sugar 09/03/2015     Priority: Medium     Hemorrhoids 01/16/2015     Priority: Medium     Incontinence 09/30/2013     Priority: Medium     H/O colonoscopy 10/08/2012     Priority: Medium     Overview:   2008       Corns and callosities 12/12/2011     Priority: Medium     Personal history of other mental disorder 06/03/2010     Priority: Medium        Past Medical History:    History reviewed. No pertinent past medical history.    Past Surgical History:    Past Surgical History:   Procedure Laterality Date     CHOLECYSTECTOMY      No Comments Provided     COLONOSCOPY   2010     ESOPHAGOSCOPY, GASTROSCOPY, DUODENOSCOPY (EGD), COMBINED           EXTRACAPSULAR CATARACT EXTRATION WITH INTRAOCULAR LENS IMPLANT      rt     OTHER SURGICAL HISTORY      88605.1,MS LAP VENTRAL INCISIONAL HERNIORRAPHY,with mesh     OTHER SURGICAL HISTORY      00658.0,MS FUSION OF WRIST JOINT     OTHER SURGICAL HISTORY      [id number],OTHER     OTHER SURGICAL HISTORY      886718,BONE MARROW ASPIRATION       Family History:    Family History   Problem Relation Age of Onset     Breast Cancer Mother         Cancer-breast     Heart Disease Father 74        Heart Disease,MI     Hypertension Brother         Hypertension     Arthritis Brother         Arthritis,Rheumatoid     Diabetes No family hx of         Diabetes       Social History:  Marital Status:   [2]  Social History     Tobacco Use     Smoking status: Former Smoker     Quit date: 10/8/1985     Years since quittin.9     Smokeless tobacco: Former User   Vaping Use     Vaping Use: Never used   Substance Use Topics     Alcohol use: Yes     Alcohol/week: 2.0 standard drinks     Comment: occasional     Drug use: No        Medications:    amLODIPine (NORVASC) 5 MG tablet  hydrochlorothiazide (HYDRODIURIL) 25 MG tablet  loratadine (CLARITIN) 10 MG tablet  Misc Natural Products (MENS PROSTATE HEALTH FORMULA PO)  Multiple Vitamins-Minerals (MULTI-VITAMIN/MINERALS) TABS  multivitamin  with lutein (OCUVITE WITH LUTEIN) CAPS per capsule  order for DME  potassium 99 MG TABS  Sharps Container (SHARPS-A-GATOR LOCKING BRACKET) MISC  tamsulosin (FLOMAX) 0.4 MG capsule          Review of Systems   Musculoskeletal: Positive for neck pain.       Physical Exam   BP: (!) 147/102  Pulse: 88  Temp: 97.7  F (36.5  C)  Resp: 20  Height: 182.9 cm (6')  Weight: 110.7 kg (244 lb)  SpO2: 98 %      Physical Exam  Vitals and nursing note reviewed.   Constitutional:       General: He is in acute distress.      Appearance: He is not ill-appearing, toxic-appearing or  diaphoretic.   HENT:      Head: Normocephalic and atraumatic.      Right Ear: External ear normal.      Left Ear: External ear normal.      Nose: Nose normal.   Eyes:      Extraocular Movements: Extraocular movements intact.      Conjunctiva/sclera: Conjunctivae normal.      Pupils: Pupils are equal, round, and reactive to light.      Comments: Normal peripheral vision, no conjugate gaze   Neck:      Comments: He has a muscle spasm along the left side of his neck.  Cardiovascular:      Rate and Rhythm: Normal rate.      Pulses: Normal pulses.   Pulmonary:      Effort: Pulmonary effort is normal. No respiratory distress.   Musculoskeletal:      Cervical back: Normal range of motion. Rigidity and tenderness present.   Lymphadenopathy:      Cervical: No cervical adenopathy.   Skin:     General: Skin is warm and dry.   Neurological:      General: No focal deficit present.      Mental Status: He is alert and oriented to person, place, and time.      Cranial Nerves: No cranial nerve deficit.      Sensory: No sensory deficit.      Motor: No weakness.      Coordination: Coordination normal.      Gait: Gait normal.         Medications   ketorolac (TORADOL) injection 15 mg (15 mg Intramuscular Given 9/3/22 0504)       Assessments & Plan (with Medical Decision Making)  Jarad Purvis is a 72 year old male here with a left sided neck spasm. He went to bed with some neck pain and took ibuprofen. He woke up at about 0200 with worsening neck pain and took some Tylenol. Now the pain is worse and he has pain 7 or 8 of 10 on the left side of his neck. He says this is different. He is worried he is having a stroke. He has been to a chiropractor in the past and they would massage his neck, which usually helped.  He has a history of CP with right hand palsy and a history of neck spasm.  VS in the ED BP (!) 143/95   Pulse 70   Temp 97.7  F (36.5  C) (Temporal)   Resp 20   Ht 1.829 m (6')   Wt 110.7 kg (244 lb)   SpO2 95%   BMI  33.09 kg/m    Exam shows left sided neck tension. I was able to massage this and applied heat and he did get an IM shot of Toradol.  He felt a bit better after that.      I have reviewed the nursing notes.    I have reviewed the findings, diagnosis, plan and need for follow up with the patient.    Final diagnoses:   Neck muscle spasm - left SCM muscle       9/3/2022   [facility] AND [facility], George Bee MD  09/03/22 9808

## 2022-09-03 NOTE — ED TRIAGE NOTES
Pt woke from sleep with neck pain.  Stated he has had neck pain in the past, but this is worse.  Pt denies any weakness or numbness.  No vision changes.        Triage Assessment     Row Name 09/03/22 0455       Triage Assessment (Adult)    Airway WDL WDL       Respiratory WDL    Respiratory WDL WDL       Skin Circulation/Temperature WDL    Skin Circulation/Temperature WDL WDL       Cardiac WDL    Cardiac WDL WDL       Peripheral/Neurovascular WDL    Peripheral Neurovascular WDL WDL

## 2022-09-17 ENCOUNTER — HEALTH MAINTENANCE LETTER (OUTPATIENT)
Age: 72
End: 2022-09-17

## 2022-10-16 ENCOUNTER — APPOINTMENT (OUTPATIENT)
Dept: GENERAL RADIOLOGY | Facility: OTHER | Age: 72
End: 2022-10-16
Attending: FAMILY MEDICINE
Payer: MEDICARE

## 2022-10-16 ENCOUNTER — HOSPITAL ENCOUNTER (EMERGENCY)
Facility: OTHER | Age: 72
Discharge: HOME | End: 2022-10-16
Attending: FAMILY MEDICINE | Admitting: FAMILY MEDICINE
Payer: MEDICARE

## 2022-10-16 VITALS
RESPIRATION RATE: 18 BRPM | TEMPERATURE: 97.5 F | DIASTOLIC BLOOD PRESSURE: 84 MMHG | HEIGHT: 70 IN | BODY MASS INDEX: 35.07 KG/M2 | WEIGHT: 245 LBS | HEART RATE: 93 BPM | SYSTOLIC BLOOD PRESSURE: 140 MMHG | OXYGEN SATURATION: 95 %

## 2022-10-16 DIAGNOSIS — M25.562 LEFT KNEE PAIN, UNSPECIFIED CHRONICITY: ICD-10-CM

## 2022-10-16 LAB
BASOPHILS # BLD AUTO: 0 10E3/UL (ref 0–0.2)
BASOPHILS NFR BLD AUTO: 0 %
CRP SERPL-MCNC: 21.5 MG/L
EOSINOPHIL # BLD AUTO: 0.2 10E3/UL (ref 0–0.7)
EOSINOPHIL NFR BLD AUTO: 2 %
ERYTHROCYTE [DISTWIDTH] IN BLOOD BY AUTOMATED COUNT: 13.3 % (ref 10–15)
HCT VFR BLD AUTO: 41 % (ref 40–53)
HGB BLD-MCNC: 14 G/DL (ref 13.3–17.7)
HOLD SPECIMEN: NORMAL
IMM GRANULOCYTES # BLD: 0 10E3/UL
IMM GRANULOCYTES NFR BLD: 0 %
LYMPHOCYTES # BLD AUTO: 1.5 10E3/UL (ref 0.8–5.3)
LYMPHOCYTES NFR BLD AUTO: 16 %
MCH RBC QN AUTO: 30.7 PG (ref 26.5–33)
MCHC RBC AUTO-ENTMCNC: 34.1 G/DL (ref 31.5–36.5)
MCV RBC AUTO: 90 FL (ref 78–100)
MONOCYTES # BLD AUTO: 1 10E3/UL (ref 0–1.3)
MONOCYTES NFR BLD AUTO: 11 %
NEUTROPHILS # BLD AUTO: 6.6 10E3/UL (ref 1.6–8.3)
NEUTROPHILS NFR BLD AUTO: 71 %
NRBC # BLD AUTO: 0 10E3/UL
NRBC BLD AUTO-RTO: 0 /100
PLATELET # BLD AUTO: 248 10E3/UL (ref 150–450)
RBC # BLD AUTO: 4.56 10E6/UL (ref 4.4–5.9)
WBC # BLD AUTO: 9.3 10E3/UL (ref 4–11)

## 2022-10-16 PROCEDURE — 36415 COLL VENOUS BLD VENIPUNCTURE: CPT | Performed by: FAMILY MEDICINE

## 2022-10-16 PROCEDURE — 250N000009 HC RX 250: Performed by: FAMILY MEDICINE

## 2022-10-16 PROCEDURE — 73562 X-RAY EXAM OF KNEE 3: CPT | Mod: LT

## 2022-10-16 PROCEDURE — 85025 COMPLETE CBC W/AUTO DIFF WBC: CPT | Performed by: FAMILY MEDICINE

## 2022-10-16 PROCEDURE — 86140 C-REACTIVE PROTEIN: CPT | Performed by: FAMILY MEDICINE

## 2022-10-16 PROCEDURE — 99284 EMERGENCY DEPT VISIT MOD MDM: CPT | Performed by: FAMILY MEDICINE

## 2022-10-16 RX ORDER — DEXAMETHASONE SODIUM PHOSPHATE 4 MG/ML
10 VIAL (ML) INJECTION ONCE
Status: COMPLETED | OUTPATIENT
Start: 2022-10-16 | End: 2022-10-16

## 2022-10-16 RX ADMIN — DEXAMETHASONE SODIUM PHOSPHATE 10 MG: 4 INJECTION, SOLUTION INTRAMUSCULAR; INTRAVENOUS at 10:49

## 2022-10-16 ASSESSMENT — ENCOUNTER SYMPTOMS
JOINT SWELLING: 1
WOUND: 0
COLOR CHANGE: 0

## 2022-10-16 NOTE — ED TRIAGE NOTES
Pt comes into the ER today from home with left knee pain that started yesterday. He thinks it went out when he was going down the stairs maybe late morning. No trauma, he did not fall. History arthritis. Pain with bending the knee. Some improvement with ibuprofen, last this am, has been using cane. Knee is swollen, no redness. This has happened before about a year ago. Worse this time.      Triage Assessment     Row Name 10/16/22 0927       Triage Assessment (Adult)    Airway WDL WDL       Respiratory WDL    Respiratory WDL WDL       Skin Circulation/Temperature WDL    Skin Circulation/Temperature WDL WDL       Cardiac WDL    Cardiac WDL WDL       Peripheral/Neurovascular WDL    Peripheral Neurovascular WDL WDL       Cognitive/Neuro/Behavioral WDL    Cognitive/Neuro/Behavioral WDL WDL

## 2022-10-16 NOTE — DISCHARGE INSTRUCTIONS
Jarad    I think you have some arthritis in the left knee. The xray supports this. I hope that the decadron will help it to calm down.  You can take ibuprofen as well.     Thank you for choosing our Emergency Department for your care.     You may receive a phone call or letter for a survey about your care in our ED.  Please complete this as this is how we improve care for our patients.     If you have any questions after leaving the ED you can call or text me on my cell phone at [phone] and I will get back to you at some point. This does not mean that I am on call and if you are not doing well please return to the ED.     Sincerely,    Dr Eddi Spain M.D.

## 2022-10-16 NOTE — ED PROVIDER NOTES
History     Chief Complaint   Patient presents with     Knee Pain     The history is provided by the patient and medical records.     Jarad Purvis is a 72 year old male here with left knee pain. This started yesterday and seems worse with bending the knee. He feels better now lying in bed and he had some ibuprofen about 45 minutes ago. No injury or fall.     He has a history of left knee pain about 1 or 2 years ago, also no injury or trauma.  He has arthritis in the right ankle and wears a brace most of the time.  He has a history of CP, obesity and arthritis.  No history of surgery on this joint.     Allergies:  Allergies   Allergen Reactions     Phenobarbital Hives       Problem List:    Patient Active Problem List    Diagnosis Date Noted     Morbid obesity (H) 08/29/2022     Priority: Medium     Cerebral palsy (H) 01/25/2018     Priority: Medium     Overview:   right arm flexion contractures       Diverticular disease of colon 01/25/2018     Priority: Medium     Hiatal hernia 01/25/2018     Priority: Medium     Hypertension 01/25/2018     Priority: Medium     Primary thrombocytopenia (H) 01/25/2018     Priority: Medium     Convulsions (H) 01/25/2018     Priority: Medium     Overview:   last seizure age 14  no longer on medications       Sleep apnea 01/25/2018     Priority: Medium     Elevated blood sugar 09/03/2015     Priority: Medium     Hemorrhoids 01/16/2015     Priority: Medium     Incontinence 09/30/2013     Priority: Medium     H/O colonoscopy 10/08/2012     Priority: Medium     Overview:   2008       Corns and callosities 12/12/2011     Priority: Medium     Personal history of other mental disorder 06/03/2010     Priority: Medium        Past Medical History:    No past medical history on file.    Past Surgical History:    Past Surgical History:   Procedure Laterality Date     CHOLECYSTECTOMY      No Comments Provided     COLONOSCOPY  06/01/2010     ESOPHAGOSCOPY, GASTROSCOPY, DUODENOSCOPY (EGD),  "COMBINED           EXTRACAPSULAR CATARACT EXTRATION WITH INTRAOCULAR LENS IMPLANT      rt     OTHER SURGICAL HISTORY      75911.1,HI LAP VENTRAL INCISIONAL HERNIORRAPHY,with mesh     OTHER SURGICAL HISTORY      94419.0,HI FUSION OF WRIST JOINT     OTHER SURGICAL HISTORY      [id number],OTHER     OTHER SURGICAL HISTORY      [id number],BONE MARROW ASPIRATION       Family History:    Family History   Problem Relation Age of Onset     Breast Cancer Mother         Cancer-breast     Heart Disease Father 74        Heart Disease,MI     Hypertension Brother         Hypertension     Arthritis Brother         Arthritis,Rheumatoid     Diabetes No family hx of         Diabetes       Social History:  Marital Status:   [2]  Social History     Tobacco Use     Smoking status: Former     Types: Cigarettes     Quit date: 10/8/1985     Years since quittin.0     Smokeless tobacco: Former   Vaping Use     Vaping Use: Never used   Substance Use Topics     Alcohol use: Yes     Alcohol/week: 2.0 standard drinks     Comment: occasional     Drug use: No        Medications:    amLODIPine (NORVASC) 5 MG tablet  hydrochlorothiazide (HYDRODIURIL) 25 MG tablet  loratadine (CLARITIN) 10 MG tablet  Multiple Vitamins-Minerals (MULTI-VITAMIN/MINERALS) TABS  potassium 99 MG TABS  tamsulosin (FLOMAX) 0.4 MG capsule  Misc Natural Products (MENS PROSTATE HEALTH FORMULA PO)  multivitamin  with lutein (OCUVITE WITH LUTEIN) CAPS per capsule  order for DME  Sharps Container (SHARPS-A-GATOR LOCKING BRACKET) MISC          Review of Systems   Musculoskeletal: Positive for gait problem and joint swelling.   Skin: Negative for color change, rash and wound.   All other systems reviewed and are negative.      Physical Exam   BP: (!) 140/84  Pulse: 93  Temp: 97.5  F (36.4  C)  Resp: 18  Height: 177.8 cm (5' 10\")  Weight: 111.1 kg (245 lb)  SpO2: 95 %      Physical Exam  Vitals and nursing note reviewed.   Constitutional:       General: He is not in acute " distress.     Appearance: Normal appearance. He is not ill-appearing, toxic-appearing or diaphoretic.   Cardiovascular:      Rate and Rhythm: Normal rate.      Pulses: Normal pulses.   Pulmonary:      Effort: Pulmonary effort is normal. No respiratory distress.   Musculoskeletal:         General: Swelling and tenderness present. No deformity or signs of injury.      Comments: He has swelling, excessively warm to touch, and tenderness of the left knee. No tenderness proximal or distal to the knee, and no tenderness behind the knee. No swelling of the lower leg.   Skin:     General: Skin is warm and dry.      Findings: No bruising, erythema, lesion or rash.   Neurological:      General: No focal deficit present.      Mental Status: He is alert and oriented to person, place, and time.      Sensory: No sensory deficit.   Psychiatric:         Mood and Affect: Mood normal.         Behavior: Behavior normal.         Results for orders placed or performed during the hospital encounter of 10/16/22 (from the past 24 hour(s))   CBC with platelets differential    Narrative    The following orders were created for panel order CBC with platelets differential.  Procedure                               Abnormality         Status                     ---------                               -----------         ------                     CBC with platelets and d...[[id number]]                      Final result                 Please view results for these tests on the individual orders.   CRP inflammation   Result Value Ref Range    CRP Inflammation 21.5 (H) <10.0 mg/L   CBC with platelets and differential   Result Value Ref Range    WBC Count 9.3 4.0 - 11.0 10e3/uL    RBC Count 4.56 4.40 - 5.90 10e6/uL    Hemoglobin 14.0 13.3 - 17.7 g/dL    Hematocrit 41.0 40.0 - 53.0 %    MCV 90 78 - 100 fL    MCH 30.7 26.5 - 33.0 pg    MCHC 34.1 31.5 - 36.5 g/dL    RDW 13.3 10.0 - 15.0 %    Platelet Count 248 150 - 450 10e3/uL    % Neutrophils 71 %     % Lymphocytes 16 %    % Monocytes 11 %    % Eosinophils 2 %    % Basophils 0 %    % Immature Granulocytes 0 %    NRBCs per 100 WBC 0 <1 /100    Absolute Neutrophils 6.6 1.6 - 8.3 10e3/uL    Absolute Lymphocytes 1.5 0.8 - 5.3 10e3/uL    Absolute Monocytes 1.0 0.0 - 1.3 10e3/uL    Absolute Eosinophils 0.2 0.0 - 0.7 10e3/uL    Absolute Basophils 0.0 0.0 - 0.2 10e3/uL    Absolute Immature Granulocytes 0.0 <=0.4 10e3/uL    Absolute NRBCs 0.0 10e3/uL   Extra Tube    Narrative    The following orders were created for panel order Extra Tube.  Procedure                               Abnormality         Status                     ---------                               -----------         ------                     Extra Blue Top Tube[[id number]]                              In process                 Extra Serum Separator Tu...[[id number]]                      In process                 Extra Green Top (Lithium...[[id number]]                      In process                   Please view results for these tests on the individual orders.   XR Knee Left 3 Views    Narrative    PROCEDURE:  XR KNEE LEFT 3 VIEWS    HISTORY: sore, swollen, warm to touch, no redness, more pain with ROM  testing, no history of surgery or trauma.    COMPARISON:  None.    TECHNIQUE:  3 views left hand.    FINDINGS:  No fracture or bony destruction is identified.  Osteophytosis is best seen in the patellofemoral compartment. No  foreign body is seen.       Impression    IMPRESSION: Patellofemoral predominant osteoarthritis.      ELICEO ROBLEDO MD         SYSTEM ID:  [id number]       Medications   dexamethasone (DECADRON) injectable solution used ORALLY 10 mg (has no administration in time range)       Assessments & Plan (with Medical Decision Making)  Jarad Purvis is a 72 year old male here with left knee pain. This started yesterday and seems worse with bending the knee. He feels better now lying in bed and he had some ibuprofen about 45 minutes  "ago. No injury or fall.  He has a history of left knee pain about 1 or 2 years ago, also no injury or trauma.  He has arthritis in the right ankle and wears a brace most of the time.  He has a history of CP, obesity and arthritis. No history of surgery on this joint.  VS in the ED BP (!) 140/84   Pulse 93   Temp 97.5  F (36.4  C) (Tympanic)   Resp 18   Ht 1.778 m (5' 10\")   Wt 111.1 kg (245 lb)   SpO2 95%   BMI 35.15 kg/m    Exam shows swelling, tenderness and warmth of the left knee. No skin bruising or erythema on the knee. Labs show CBC normal, CRP 21.5. Xray shows arthritis. We gave a dose of decadron in the ED and recommend ibuprofen at home.      I have reviewed the nursing notes.    I have reviewed the findings, diagnosis, plan and need for follow up with the patient.    Final diagnoses:   Left knee pain, unspecified chronicity       10/16/2022   [facility], George Bee MD  10/16/22 1039    "

## 2022-12-14 ENCOUNTER — IMMUNIZATION (OUTPATIENT)
Dept: FAMILY MEDICINE | Facility: OTHER | Age: 72
End: 2022-12-14
Attending: FAMILY MEDICINE
Payer: MEDICARE

## 2022-12-14 DIAGNOSIS — Z23 HIGH PRIORITY FOR 2019-NCOV VACCINE: ICD-10-CM

## 2022-12-14 DIAGNOSIS — Z23 NEED FOR PROPHYLACTIC VACCINATION AND INOCULATION AGAINST INFLUENZA: Primary | ICD-10-CM

## 2022-12-14 PROCEDURE — 0124A COVID-19 VACCINE BIVALENT BOOSTER 12+ (PFIZER): CPT

## 2022-12-14 PROCEDURE — G0008 ADMIN INFLUENZA VIRUS VAC: HCPCS

## 2022-12-14 PROCEDURE — 91312 COVID-19 VACCINE BIVALENT BOOSTER 12+ (PFIZER): CPT

## 2022-12-20 ENCOUNTER — OFFICE VISIT (OUTPATIENT)
Dept: FAMILY MEDICINE | Facility: OTHER | Age: 72
End: 2022-12-20
Attending: FAMILY MEDICINE
Payer: COMMERCIAL

## 2022-12-20 VITALS
SYSTOLIC BLOOD PRESSURE: 122 MMHG | RESPIRATION RATE: 16 BRPM | WEIGHT: 243 LBS | HEART RATE: 96 BPM | OXYGEN SATURATION: 95 % | BODY MASS INDEX: 34.87 KG/M2 | TEMPERATURE: 98 F | DIASTOLIC BLOOD PRESSURE: 70 MMHG

## 2022-12-20 DIAGNOSIS — J01.40 ACUTE NON-RECURRENT PANSINUSITIS: Primary | ICD-10-CM

## 2022-12-20 PROCEDURE — 99213 OFFICE O/P EST LOW 20 MIN: CPT | Performed by: FAMILY MEDICINE

## 2022-12-20 PROCEDURE — G0463 HOSPITAL OUTPT CLINIC VISIT: HCPCS | Mod: 25

## 2022-12-20 RX ORDER — AMOXICILLIN 875 MG
875 TABLET ORAL 2 TIMES DAILY
Qty: 14 TABLET | Refills: 0 | Status: SHIPPED | OUTPATIENT
Start: 2022-12-20 | End: 2022-12-27

## 2022-12-20 RX ORDER — FLUTICASONE PROPIONATE 50 MCG
2 SPRAY, SUSPENSION (ML) NASAL DAILY
Qty: 16 G | Refills: 11 | Status: SHIPPED | OUTPATIENT
Start: 2022-12-20

## 2022-12-20 ASSESSMENT — ANXIETY QUESTIONNAIRES
GAD7 TOTAL SCORE: 0
GAD7 TOTAL SCORE: 0
3. WORRYING TOO MUCH ABOUT DIFFERENT THINGS: NOT AT ALL
7. FEELING AFRAID AS IF SOMETHING AWFUL MIGHT HAPPEN: NOT AT ALL
1. FEELING NERVOUS, ANXIOUS, OR ON EDGE: NOT AT ALL
IF YOU CHECKED OFF ANY PROBLEMS ON THIS QUESTIONNAIRE, HOW DIFFICULT HAVE THESE PROBLEMS MADE IT FOR YOU TO DO YOUR WORK, TAKE CARE OF THINGS AT HOME, OR GET ALONG WITH OTHER PEOPLE: NOT DIFFICULT AT ALL
6. BECOMING EASILY ANNOYED OR IRRITABLE: NOT AT ALL
2. NOT BEING ABLE TO STOP OR CONTROL WORRYING: NOT AT ALL
5. BEING SO RESTLESS THAT IT IS HARD TO SIT STILL: NOT AT ALL

## 2022-12-20 ASSESSMENT — PATIENT HEALTH QUESTIONNAIRE - PHQ9
5. POOR APPETITE OR OVEREATING: NOT AT ALL
SUM OF ALL RESPONSES TO PHQ QUESTIONS 1-9: 0

## 2022-12-20 ASSESSMENT — PAIN SCALES - GENERAL: PAINLEVEL: NO PAIN (0)

## 2022-12-20 NOTE — NURSING NOTE
"Patient presents to the clinic for acute illness.    FOOD SECURITY SCREENING QUESTIONS:    The next two questions are to help us understand your food security.  If you are feeling you need any assistance in this area, we have resources available to support you today.    Hunger Vital Signs:  Within the past 12 months we worried whether our food would run out before we got money to buy more. Never  Within the past 12 months the food we bought just didn't last and we didn't have money to get more. Never    Advance Care Directive on file? yes  Advance Care Directive provided to patient? Declined.    Chief Complaint   Patient presents with     Illness     Acute         Initial /70 (BP Location: Left arm, Patient Position: Sitting, Cuff Size: Adult Large)   Pulse 96   Temp 98  F (36.7  C) (Tympanic)   Resp 16   Wt 110.2 kg (243 lb)   SpO2 95%   BMI 34.87 kg/m   Estimated body mass index is 34.87 kg/m  as calculated from the following:    Height as of 10/16/22: 1.778 m (5' 10\").    Weight as of this encounter: 110.2 kg (243 lb).  Medication Reconciliation: complete        Sheeba Leung LPN       "

## 2022-12-20 NOTE — PROGRESS NOTES
Assessment & Plan       ICD-10-CM    1. Acute non-recurrent pansinusitis  J01.40 fluticasone (FLONASE) 50 MCG/ACT nasal spray     amoxicillin (AMOXIL) 875 MG tablet        Most sinus infections are viral. Based on symptoms and current exam, he could resolve this infection. Fluticasone seems to open him up and results in more rhinorrhea with less sinus pressure. Recommend using regularly. If not improving or worse, then start amoxicillin.      Bhargav Fraga MD   [facility]     Subjective   Ang is a 72 year old accompanied by his spouse, presenting for the following health issues:  Illness (Acute/)      HPI     Acute Illness  Acute illness concerns: cough/cold  Onset/Duration: 1 week  Symptoms:  Fever: No  Chills/Sweats: YES  Headache (location?): YES  Sinus Pressure: YES  Conjunctivitis:  YES  Ear Pain: no  Rhinorrhea: YES  Congestion: YES  Sore Throat: No  Cough: YES-productive of green sputum  Wheeze: YES  Decreased Appetite: No  Nausea: No  Vomiting: No  Diarrhea: YES- loose stool  Dysuria/Freq.: No  Dysuria or Hematuria: No  Fatigue/Achiness: YES  Sick/Strep Exposure: No  Therapies tried and outcome: Flonase, Nyquil/Dayquil, cough drops    Congested in the morning  On CPAP, but had to stop due to sinus congestion  Occasional chills  No chest tightness  Using fluticasone for nasal congestion, but only as needed and not frequently during this illness      Review of Systems   As above      Objective    /70 (BP Location: Left arm, Patient Position: Sitting, Cuff Size: Adult Large)   Pulse 96   Temp 98  F (36.7  C) (Tympanic)   Resp 16   Wt 110.2 kg (243 lb)   SpO2 95%   BMI 34.87 kg/m    Body mass index is 34.87 kg/m .  Physical Exam   General Appearance: Pleasant, alert, appropriate appearance for age. No acute distress  Head: nontender to palpation over frontal and maxillary sinuses.  Ear Exam: Normal TM's bilaterally. Normal auditory canals and external ears.  Nose Exam: Nasal  mucosa: clear rhinorrhea. Turbinates: inflamed  OroPharynx Exam:  Normal buccal mucosa. Normal pharynx.  Neck Exam: Supple, no masses or nodes.  Chest/Respiratory Exam: Normal chest wall and respirations. Clear to auscultation.

## 2023-02-06 ENCOUNTER — MEDICAL CORRESPONDENCE (OUTPATIENT)
Dept: HEALTH INFORMATION MANAGEMENT | Facility: OTHER | Age: 73
End: 2023-02-06
Payer: COMMERCIAL

## 2023-02-22 ENCOUNTER — OFFICE VISIT (OUTPATIENT)
Dept: FAMILY MEDICINE | Facility: OTHER | Age: 73
End: 2023-02-22
Attending: NURSE PRACTITIONER
Payer: MEDICARE

## 2023-02-22 ENCOUNTER — HOSPITAL ENCOUNTER (OUTPATIENT)
Dept: ULTRASOUND IMAGING | Facility: OTHER | Age: 73
Discharge: HOME | End: 2023-02-22
Attending: NURSE PRACTITIONER
Payer: MEDICARE

## 2023-02-22 VITALS
WEIGHT: 252 LBS | HEIGHT: 67 IN | OXYGEN SATURATION: 97 % | TEMPERATURE: 97.2 F | RESPIRATION RATE: 20 BRPM | DIASTOLIC BLOOD PRESSURE: 74 MMHG | BODY MASS INDEX: 39.55 KG/M2 | HEART RATE: 100 BPM | SYSTOLIC BLOOD PRESSURE: 124 MMHG

## 2023-02-22 DIAGNOSIS — M79.662 PAIN AND SWELLING OF LEFT LOWER LEG: Primary | ICD-10-CM

## 2023-02-22 DIAGNOSIS — M79.89 PAIN AND SWELLING OF LEFT LOWER LEG: Primary | ICD-10-CM

## 2023-02-22 DIAGNOSIS — L53.9 ERYTHEMA OF LOWER EXTREMITY: ICD-10-CM

## 2023-02-22 PROCEDURE — 99213 OFFICE O/P EST LOW 20 MIN: CPT | Mod: 25 | Performed by: NURSE PRACTITIONER

## 2023-02-22 PROCEDURE — 93971 EXTREMITY STUDY: CPT | Mod: LT

## 2023-02-22 PROCEDURE — G0463 HOSPITAL OUTPT CLINIC VISIT: HCPCS | Mod: 25

## 2023-02-22 RX ORDER — CEPHALEXIN 500 MG/1
500 CAPSULE ORAL 2 TIMES DAILY
Qty: 14 CAPSULE | Refills: 0 | Status: SHIPPED | OUTPATIENT
Start: 2023-02-22 | End: 2023-03-01

## 2023-02-22 ASSESSMENT — PAIN SCALES - GENERAL: PAINLEVEL: NO PAIN (0)

## 2023-02-22 ASSESSMENT — PATIENT HEALTH QUESTIONNAIRE - PHQ9: SUM OF ALL RESPONSES TO PHQ QUESTIONS 1-9: 0

## 2023-02-22 NOTE — NURSING NOTE
"Chief Complaint   Patient presents with     Leg Swelling     Left lower leg red, warm         Initial /74   Pulse 100   Temp 97.2  F (36.2  C) (Tympanic)   Resp 20   Ht 1.708 m (5' 7.25\")   Wt 114.3 kg (252 lb)   SpO2 97%   BMI 39.18 kg/m   Estimated body mass index is 39.18 kg/m  as calculated from the following:    Height as of this encounter: 1.708 m (5' 7.25\").    Weight as of this encounter: 114.3 kg (252 lb).         Norma J. Gosselin, LPN   "

## 2023-02-22 NOTE — PROGRESS NOTES
ASSESSMENT/PLAN:     I have reviewed the nursing notes.  I have reviewed the findings, diagnosis, plan and need for follow up with the patient.      1. Pain and swelling of left lower leg    - US Lower Extremity Venous Duplex Left    Discussed with patient I have low suspicion of DVT based on symptoms and exam, patient prefers to be sure and there fore venous US was completed.  Radiologist over read of left lower extremity venous ultrasound:  Negative for DVT.      Patient with chronic bilateral lower extremity edema.  Recommend elevation, remain active, may try compression socks.      Follow up if symptoms persist or worsen or concerns    2. Erythema of lower extremity    - cephALEXin (KEFLEX) 500 MG capsule; Take 1 capsule (500 mg) by mouth 2 times daily for 7 days  Dispense: 14 capsule; Refill: 0    Left lower extremity with fine erythematous blanching erythematous rash - appears most consistent with heat rash with low suspicion of infection there fore patient is provided with written Rx for oral antibiotics to start only if worsening rash with warmth and tenderness.      May use over-the-counter Tylenol or ibuprofen PRN    Discussed warning signs/symptoms indicative of need to f/u  Follow up if symptoms persist or worsen or concerns      I explained my diagnostic considerations and recommendations to the patient, who voiced understanding and agreement with the treatment plan. All questions were answered. We discussed potential side effects of any prescribed or recommended therapies, as well as expectations for response to treatments.    Stella Diaz NP on 2/22/2023 at 3:15 PM  [facility] AND [facility]      SUBJECTIVE:   Jarad Purvis is a 72 year old male who presents to clinic today for the following health issues:  Leg swelling and redness    HPI  Accompanied today by his spouse.  Information obtained by patient.  States he was shoveling snow yesterday afterwards noted increased swelling,  redness, and warmth of his left lower extremity (shin).  States he has chronic bilateral lower extremity edema but is mostly concerned about the redness and warmth.  The redness has been persisting today.  The warmth has decreased.  He is unsure if the edema decreased over night while sleeping, states it normally decreases with elevation and increases with activity.  States he tries to stay busy and on his feet.   Feeling of nerve burning in left shin, states not constant.  Chronic neuropathy in both feet.  Chronic use of brace on right lower extremity.    No fevers.  No chest pain.  No shortness of breath.  No anticoagulation.    Takes Ibuprofen and Tylenol for chronic aches/pains          History reviewed. No pertinent past medical history.  Past Surgical History:   Procedure Laterality Date     CHOLECYSTECTOMY      No Comments Provided     COLONOSCOPY  2010     ESOPHAGOSCOPY, GASTROSCOPY, DUODENOSCOPY (EGD), COMBINED           EXTRACAPSULAR CATARACT EXTRATION WITH INTRAOCULAR LENS IMPLANT      rt     OTHER SURGICAL HISTORY      80005.1,ND LAP VENTRAL INCISIONAL HERNIORRAPHY,with mesh     OTHER SURGICAL HISTORY      09525.0,ND FUSION OF WRIST JOINT     OTHER SURGICAL HISTORY      140603,OTHER     OTHER SURGICAL HISTORY      107284,BONE MARROW ASPIRATION     Social History     Tobacco Use     Smoking status: Former     Types: Cigarettes     Quit date: 10/8/1985     Years since quittin.4     Smokeless tobacco: Former   Substance Use Topics     Alcohol use: Not Currently     Alcohol/week: 2.0 standard drinks     Types: 2 Standard drinks or equivalent per week     Comment: occasional     Current Outpatient Medications   Medication Sig Dispense Refill     amLODIPine (NORVASC) 5 MG tablet Take 1 tablet (5 mg) by mouth daily 90 tablet 4     fluticasone (FLONASE) 50 MCG/ACT nasal spray Spray 2 sprays into both nostrils daily 16 g 11     hydrochlorothiazide (HYDRODIURIL) 25 MG tablet Take 1 tablet (25 mg) by  "mouth daily 90 tablet 3     loratadine (CLARITIN) 10 MG tablet Take 10 mg by mouth daily       Misc Natural Products (MENS PROSTATE HEALTH FORMULA PO) Take 1 capsule by mouth daily       Multiple Vitamins-Minerals (MULTI-VITAMIN/MINERALS) TABS Take 1 tablet by mouth daily       multivitamin  with lutein (OCUVITE WITH LUTEIN) CAPS per capsule Take 1 capsule by mouth daily       order for DME Equipment being ordered: CPAP and supplies 1 Units 0     potassium 99 MG TABS        Sharps Container (SHARPS-A-GATOR LOCKING BRACKET) MISC CPAP, heated humidifier, mask, headgear, filters and tubing. For home use. Pressure: ?  Length of Need: lifetime       tamsulosin (FLOMAX) 0.4 MG capsule Take 1 capsule (0.4 mg) by mouth daily 90 capsule 3     Allergies   Allergen Reactions     Phenobarbital Hives         Past medical history, past surgical history, current medications and allergies reviewed and accurate to the best of my knowledge.        OBJECTIVE:     /74   Pulse 100   Temp 97.2  F (36.2  C) (Tympanic)   Resp 20   Ht 1.708 m (5' 7.25\")   Wt 114.3 kg (252 lb)   SpO2 97%   BMI 39.18 kg/m    Body mass index is 39.18 kg/m .     Physical Exam  General Appearance: Well appearing elderly male, appropriate appearance for age. No acute distress  Respiratory: normal chest wall and respirations.  Normal effort.  Clear to auscultation bilaterally, no wheezing, crackles or rhonchi.  No increased work of breathing.  No cough appreciated.  Cardiac: RRR with no murmurs .  CMS intact to left lower extremity with brisk capillary refill and strong pedal pulse.  Left lower extremity with 1-2+ edema.  left calf soft, supple, and non tender to palpation.  Right lower extremity with 2-3+ edema.  Right calf soft and non tender.  Musculoskeletal:  Equal movement of bilateral upper extremities.  Equal movement of bilateral lower extremities.  Normal gait.    Dermatological: Bilateral lower extremities with fine erythematous papular " rash without associated warmth, no blisters or pustules, no open lesions or sores, no drainage, bleeding or crusting.    Psychological: normal affect, alert, oriented, and pleasant.       Imaging:  Results for orders placed or performed in visit on 02/22/23   US Lower Extremity Venous Duplex Left     Status: None    Narrative    EXAMINATION: DOPPLER VENOUS ULTRASOUND OF THE LEFT LOWER EXTREMITY,  2/22/2023 3:08 PM     COMPARISON: Ultrasound 6/13/2021    HISTORY: Pain and swelling of left lower leg; Pain and swelling of  left lower leg    TECHNIQUE:  Gray-scale evaluation with compression, spectral flow, and  color Doppler assessment of the deep venous system of the left leg  from groin to the calf.    FINDINGS:  In the left lower extremity, the common femoral, femoral, popliteal,  peroneal and posterior tibial veins demonstrate normal compressibility  and blood flow.      Impression    IMPRESSION:  No evidence of left lower extremity deep venous thrombosis.    ROCAEL MARQUEZ MD         SYSTEM ID:  [id number]

## 2023-04-02 DIAGNOSIS — N39.490 OVERFLOW INCONTINENCE OF URINE: ICD-10-CM

## 2023-04-02 DIAGNOSIS — I10 ESSENTIAL HYPERTENSION: ICD-10-CM

## 2023-04-05 RX ORDER — HYDROCHLOROTHIAZIDE 25 MG/1
25 TABLET ORAL DAILY
Qty: 90 TABLET | Refills: 2 | Status: SHIPPED | OUTPATIENT
Start: 2023-04-05 | End: 2023-08-28

## 2023-04-05 RX ORDER — TAMSULOSIN HYDROCHLORIDE 0.4 MG/1
0.4 CAPSULE ORAL DAILY
Qty: 90 CAPSULE | Refills: 2 | Status: SHIPPED | OUTPATIENT
Start: 2023-04-05 | End: 2023-08-28

## 2023-04-05 NOTE — TELEPHONE ENCOUNTER
Last Prescription Date: 4/4/22  Last Qty/Refills: 90 / 3  Last Office Visit: 12/20/22  Future Office Visit: none     Corinne R Thayer, RN on 4/5/2023 at 3:26 PM    Requested Prescriptions   Pending Prescriptions Disp Refills     hydrochlorothiazide (HYDRODIURIL) 25 MG tablet [Pharmacy Med Name: hydrochlorothiazide 25 mg tablet] 90 tablet 3     Sig: Take 1 tablet (25 mg) by mouth daily       Diuretics (Including Combos) Protocol Passed - 4/2/2023  8:02 AM        Passed - Blood pressure under 140/90 in past 12 months     BP Readings from Last 3 Encounters:   02/22/23 124/74   12/20/22 122/70   10/16/22 (!) 140/84              Passed - Normal serum creatinine on file in past 12 months     Recent Labs   Lab Test 04/12/22  1316   CR 0.83              Passed - Normal serum potassium on file in past 12 months     Recent Labs   Lab Test 04/12/22  1316   POTASSIUM 4.0                    Passed - Normal serum sodium on file in past 12 months     Recent Labs   Lab Test 04/12/22  1316                    tamsulosin (FLOMAX) 0.4 MG capsule [Pharmacy Med Name: tamsulosin 0.4 mg capsule] 90 capsule 3     Sig: Take 1 capsule (0.4 mg) by mouth daily       Alpha Blockers Passed - 4/2/2023  8:02 AM        Passed - Blood pressure under 140/90 in past 12 months     BP Readings from Last 3 Encounters:   02/22/23 124/74   12/20/22 122/70   10/16/22 (!) 140/84

## 2023-08-22 ASSESSMENT — ENCOUNTER SYMPTOMS
ABDOMINAL PAIN: 0
DYSURIA: 0
COUGH: 1
CHILLS: 0
NAUSEA: 0
HEMATOCHEZIA: 0
SHORTNESS OF BREATH: 1
HEADACHES: 1
FEVER: 0
ARTHRALGIAS: 0
CONSTIPATION: 0
DIARRHEA: 0
DIZZINESS: 0
NERVOUS/ANXIOUS: 0
HEMATURIA: 0
FREQUENCY: 1
JOINT SWELLING: 0
SORE THROAT: 0
HEARTBURN: 0
WEAKNESS: 1
MYALGIAS: 0
EYE PAIN: 0
PALPITATIONS: 0
PARESTHESIAS: 1

## 2023-08-22 ASSESSMENT — ACTIVITIES OF DAILY LIVING (ADL): CURRENT_FUNCTION: NO ASSISTANCE NEEDED

## 2023-08-28 ENCOUNTER — OFFICE VISIT (OUTPATIENT)
Dept: FAMILY MEDICINE | Facility: OTHER | Age: 73
End: 2023-08-28
Attending: FAMILY MEDICINE
Payer: COMMERCIAL

## 2023-08-28 VITALS
TEMPERATURE: 97.9 F | HEART RATE: 96 BPM | WEIGHT: 250.4 LBS | BODY MASS INDEX: 39.3 KG/M2 | HEIGHT: 67 IN | OXYGEN SATURATION: 94 % | DIASTOLIC BLOOD PRESSURE: 80 MMHG | SYSTOLIC BLOOD PRESSURE: 118 MMHG | RESPIRATION RATE: 16 BRPM

## 2023-08-28 DIAGNOSIS — Z00.00 ENCOUNTER FOR MEDICARE ANNUAL WELLNESS EXAM: ICD-10-CM

## 2023-08-28 DIAGNOSIS — N39.490 OVERFLOW INCONTINENCE OF URINE: ICD-10-CM

## 2023-08-28 DIAGNOSIS — G80.9 CEREBRAL PALSY, UNSPECIFIED TYPE (H): Primary | ICD-10-CM

## 2023-08-28 DIAGNOSIS — I10 ESSENTIAL HYPERTENSION: ICD-10-CM

## 2023-08-28 DIAGNOSIS — Z12.11 SPECIAL SCREENING FOR MALIGNANT NEOPLASMS, COLON: ICD-10-CM

## 2023-08-28 LAB
ALBUMIN SERPL BCG-MCNC: 4.2 G/DL (ref 3.5–5.2)
ALP SERPL-CCNC: 68 U/L (ref 40–129)
ALT SERPL W P-5'-P-CCNC: 27 U/L (ref 0–70)
ANION GAP SERPL CALCULATED.3IONS-SCNC: 11 MMOL/L (ref 7–15)
AST SERPL W P-5'-P-CCNC: 34 U/L (ref 0–45)
BASOPHILS # BLD AUTO: 0 10E3/UL (ref 0–0.2)
BASOPHILS NFR BLD AUTO: 0 %
BILIRUB SERPL-MCNC: 0.5 MG/DL
BUN SERPL-MCNC: 17.7 MG/DL (ref 8–23)
CALCIUM SERPL-MCNC: 9.7 MG/DL (ref 8.8–10.2)
CHLORIDE SERPL-SCNC: 103 MMOL/L (ref 98–107)
CREAT SERPL-MCNC: 0.8 MG/DL (ref 0.67–1.17)
DEPRECATED HCO3 PLAS-SCNC: 25 MMOL/L (ref 22–29)
EOSINOPHIL # BLD AUTO: 0.2 10E3/UL (ref 0–0.7)
EOSINOPHIL NFR BLD AUTO: 3 %
ERYTHROCYTE [DISTWIDTH] IN BLOOD BY AUTOMATED COUNT: 13.4 % (ref 10–15)
GFR SERPL CREATININE-BSD FRML MDRD: >90 ML/MIN/1.73M2
GLUCOSE SERPL-MCNC: 91 MG/DL (ref 70–99)
HCT VFR BLD AUTO: 45.5 % (ref 40–53)
HGB BLD-MCNC: 15.3 G/DL (ref 13.3–17.7)
IMM GRANULOCYTES # BLD: 0 10E3/UL
IMM GRANULOCYTES NFR BLD: 0 %
LYMPHOCYTES # BLD AUTO: 2.1 10E3/UL (ref 0.8–5.3)
LYMPHOCYTES NFR BLD AUTO: 31 %
MCH RBC QN AUTO: 30.2 PG (ref 26.5–33)
MCHC RBC AUTO-ENTMCNC: 33.6 G/DL (ref 31.5–36.5)
MCV RBC AUTO: 90 FL (ref 78–100)
MONOCYTES # BLD AUTO: 0.9 10E3/UL (ref 0–1.3)
MONOCYTES NFR BLD AUTO: 14 %
NEUTROPHILS # BLD AUTO: 3.6 10E3/UL (ref 1.6–8.3)
NEUTROPHILS NFR BLD AUTO: 52 %
NRBC # BLD AUTO: 0 10E3/UL
NRBC BLD AUTO-RTO: 0 /100
PLATELET # BLD AUTO: 260 10E3/UL (ref 150–450)
POTASSIUM SERPL-SCNC: 4.2 MMOL/L (ref 3.4–5.3)
PROT SERPL-MCNC: 7.7 G/DL (ref 6.4–8.3)
RBC # BLD AUTO: 5.07 10E6/UL (ref 4.4–5.9)
SODIUM SERPL-SCNC: 139 MMOL/L (ref 136–145)
WBC # BLD AUTO: 6.9 10E3/UL (ref 4–11)

## 2023-08-28 PROCEDURE — 85025 COMPLETE CBC W/AUTO DIFF WBC: CPT | Mod: ZL | Performed by: FAMILY MEDICINE

## 2023-08-28 PROCEDURE — 90677 PCV20 VACCINE IM: CPT

## 2023-08-28 PROCEDURE — 36415 COLL VENOUS BLD VENIPUNCTURE: CPT | Mod: ZL | Performed by: FAMILY MEDICINE

## 2023-08-28 PROCEDURE — 80053 COMPREHEN METABOLIC PANEL: CPT | Mod: ZL | Performed by: FAMILY MEDICINE

## 2023-08-28 PROCEDURE — G0439 PPPS, SUBSEQ VISIT: HCPCS | Performed by: FAMILY MEDICINE

## 2023-08-28 RX ORDER — AMLODIPINE BESYLATE 5 MG/1
5 TABLET ORAL DAILY
Qty: 90 TABLET | Refills: 4 | Status: SHIPPED | OUTPATIENT
Start: 2023-08-28 | End: 2024-09-26

## 2023-08-28 RX ORDER — HYDROCHLOROTHIAZIDE 25 MG/1
25 TABLET ORAL DAILY
Qty: 90 TABLET | Refills: 4 | Status: SHIPPED | OUTPATIENT
Start: 2023-08-28 | End: 2024-09-26

## 2023-08-28 RX ORDER — TAMSULOSIN HYDROCHLORIDE 0.4 MG/1
0.4 CAPSULE ORAL DAILY
Qty: 90 CAPSULE | Refills: 4 | Status: SHIPPED | OUTPATIENT
Start: 2023-08-28 | End: 2024-09-26

## 2023-08-28 ASSESSMENT — ENCOUNTER SYMPTOMS
HEMATOCHEZIA: 0
SORE THROAT: 0
CONSTIPATION: 0
MYALGIAS: 0
DIZZINESS: 0
NERVOUS/ANXIOUS: 0
ABDOMINAL PAIN: 0
NAUSEA: 0
WEAKNESS: 1
HEARTBURN: 0
CHILLS: 0
COUGH: 1
ARTHRALGIAS: 0
HEMATURIA: 0
SHORTNESS OF BREATH: 1
FEVER: 0
PARESTHESIAS: 1
DYSURIA: 0
JOINT SWELLING: 0
FREQUENCY: 1
HEADACHES: 1
DIARRHEA: 0
PALPITATIONS: 0
EYE PAIN: 0

## 2023-08-28 ASSESSMENT — ACTIVITIES OF DAILY LIVING (ADL): CURRENT_FUNCTION: NO ASSISTANCE NEEDED

## 2023-08-28 ASSESSMENT — PAIN SCALES - GENERAL: PAINLEVEL: NO PAIN (0)

## 2023-08-28 NOTE — NURSING NOTE
"Chief Complaint   Patient presents with    Wellness Visit     Annual Wellness Visit       Initial /80 (BP Location: Left arm, Patient Position: Chair, Cuff Size: Adult Large)   Pulse 96   Temp 97.9  F (36.6  C) (Temporal)   Resp 16   Ht 1.708 m (5' 7.25\")   Wt 113.6 kg (250 lb 6.4 oz)   SpO2 94%   BMI 38.93 kg/m   Estimated body mass index is 38.93 kg/m  as calculated from the following:    Height as of this encounter: 1.708 m (5' 7.25\").    Weight as of this encounter: 113.6 kg (250 lb 6.4 oz).  Medication Reconciliation: complete      FOOD SECURITY SCREENING QUESTIONS:    The next two questions are to help us understand your food security.  If you are feeling you need any assistance in this area, we have resources available to support you today.    Hunger Vital Signs:  Within the past 12 months we worried whether our food would run out before we got money to buy more. Never  Within the past 12 months the food we bought just didn't last and we didn't have money to get more. Never  Patria Morris LPN on 8/28/2023 at 11:21 AM     "

## 2023-08-28 NOTE — PROGRESS NOTES
"SUBJECTIVE:   Ang is a 73 year old who presents for Preventive Visit.      8/28/2023    11:17 AM   Additional Questions   Roomed by Patria HAQ LPN   Accompanied by Spouse       Are you in the first 12 months of your Medicare coverage?  No    Patient arrives here for a well exam Medicare wellness exam.  He reports that he is in need of a brace to his ankle.  States the one he currently has is falling apart and breaking.  Also is requesting Cologuard for colon cancer screening.    Healthy Habits:     In general, how would you rate your overall health?  Fair    Frequency of exercise:  4-5 days/week    Duration of exercise:  15-30 minutes    Do you usually eat at least 4 servings of fruit and vegetables a day, include whole grains    & fiber and avoid regularly eating high fat or \"junk\" foods?  Yes    Taking medications regularly:  Yes    Medication side effects:  Other    Ability to successfully perform activities of daily living:  No assistance needed    Home Safety:  No safety concerns identified    Hearing Impairment:  Difficulty following a conversation in a noisy restaurant or crowded room, feel that people are mumbling or not speaking clearly, need to ask people to speak up or repeat themselves, find that men's voices are easier to understand than woman's and difficulty understanding soft or whispered speech    In the past 6 months, have you been bothered by leaking of urine? Yes    In general, how would you rate your overall mental or emotional health?  Good    Additional concerns today:  No        Have you ever done Advance Care Planning? (For example, a Health Directive, POLST, or a discussion with a medical provider or your loved ones about your wishes): Yes, advance care planning is on file.       Fall risk  Fallen 2 or more times in the past year?: No  Any fall with injury in the past year?: No    Cognitive Screening   1) Repeat 3 items (Leader, Season, Table)    2) Clock draw: NORMAL  3) 3 item recall: " Recalls 3 objects  Results: 3 items recalled: COGNITIVE IMPAIRMENT LESS LIKELY    Mini-CogTM Copyright LESLEY Humphreys. Licensed by the author for use in [facility]; reprinted with permission (macario@[email or web address]). All rights reserved.      Do you have sleep apnea, excessive snoring or daytime drowsiness? : yes    Reviewed and updated as needed this visit by clinical staff   Tobacco  Allergies  Meds   Med Hx  Surg Hx  Fam Hx  Soc Hx        Reviewed and updated as needed this visit by Provider                 Social History     Tobacco Use     Smoking status: Former     Types: Cigarettes     Quit date: 10/8/1985     Years since quittin.9     Smokeless tobacco: Former   Substance Use Topics     Alcohol use: Not Currently     Alcohol/week: 2.0 standard drinks of alcohol     Types: 2 Standard drinks or equivalent per week     Comment: occasional             2023     5:28 PM   Alcohol Use   Prescreen: >3 drinks/day or >7 drinks/week? No     Do you have a current opioid prescription? No  Do you use any other controlled substances or medications that are not prescribed by a provider? Alcohol-Only Occasional               Current providers sharing in care for this patient include:   Patient Care Team:  Neal Enriquez MD as PCP - General (Family Practice)  Neal Enriquez MD as Assigned PCP    The following health maintenance items are reviewed in Epic and correct as of today:  Health Maintenance   Topic Date Due     MEDICARE ANNUAL WELLNESS VISIT  2023     COVID-19 Vaccine (6 - Pfizer series) 2023     COLORECTAL CANCER SCREENING  2023     Pneumococcal Vaccine: 65+ Years (2 - PCV) 2023     INFLUENZA VACCINE (1) 2023     FALL RISK ASSESSMENT  2024     LIPID  2025     ADVANCE CARE PLANNING  2027     DTAP/TDAP/TD IMMUNIZATION (2 - Td or Tdap) 2030     HEPATITIS C SCREENING  Completed     PHQ-2 (once per calendar year)  Completed     ZOSTER IMMUNIZATION   "Completed     AORTIC ANEURYSM SCREENING (SYSTEM ASSIGNED)  Completed     IPV IMMUNIZATION  Aged Out     MENINGITIS IMMUNIZATION  Aged Out     Lab work is in process          Review of Systems   Constitutional:  Negative for chills and fever.   HENT:  Positive for congestion and hearing loss. Negative for ear pain and sore throat.    Eyes:  Negative for pain and visual disturbance.   Respiratory:  Positive for cough and shortness of breath.    Cardiovascular:  Positive for peripheral edema. Negative for chest pain and palpitations.   Gastrointestinal:  Negative for abdominal pain, constipation, diarrhea, heartburn, hematochezia and nausea.   Genitourinary:  Positive for frequency and impotence. Negative for dysuria, genital sores, hematuria, penile discharge and urgency.   Musculoskeletal:  Negative for arthralgias, joint swelling and myalgias.   Skin:  Negative for rash.   Neurological:  Positive for weakness, headaches and paresthesias. Negative for dizziness.   Psychiatric/Behavioral:  Negative for mood changes. The patient is not nervous/anxious.      Constitutional, HEENT, cardiovascular, pulmonary, gi and gu systems are negative, except as otherwise noted.    OBJECTIVE:   /80 (BP Location: Left arm, Patient Position: Chair, Cuff Size: Adult Large)   Pulse 96   Temp 97.9  F (36.6  C) (Temporal)   Resp 16   Ht 1.708 m (5' 7.25\")   Wt 113.6 kg (250 lb 6.4 oz)   SpO2 94%   BMI 38.93 kg/m   Estimated body mass index is 38.93 kg/m  as calculated from the following:    Height as of this encounter: 1.708 m (5' 7.25\").    Weight as of this encounter: 113.6 kg (250 lb 6.4 oz).  Physical Exam  HENT:      Head: Normocephalic.   Cardiovascular:      Rate and Rhythm: Normal rate and regular rhythm.   Pulmonary:      Effort: Pulmonary effort is normal.      Breath sounds: Normal breath sounds.   Musculoskeletal:      Comments: Contractures in the l upper extremity.   Neurological:      Mental Status: He is alert. "   Psychiatric:         Mood and Affect: Mood normal.           Diagnostic Test Results:  Labs reviewed in Epic  Results for orders placed or performed in visit on 08/28/23   Comprehensive Metabolic Panel     Status: Normal   Result Value Ref Range    Sodium 139 136 - 145 mmol/L    Potassium 4.2 3.4 - 5.3 mmol/L    Chloride 103 98 - 107 mmol/L    Carbon Dioxide (CO2) 25 22 - 29 mmol/L    Anion Gap 11 7 - 15 mmol/L    Urea Nitrogen 17.7 8.0 - 23.0 mg/dL    Creatinine 0.80 0.67 - 1.17 mg/dL    Calcium 9.7 8.8 - 10.2 mg/dL    Glucose 91 70 - 99 mg/dL    Alkaline Phosphatase 68 40 - 129 U/L    AST 34 0 - 45 U/L    ALT 27 0 - 70 U/L    Protein Total 7.7 6.4 - 8.3 g/dL    Albumin 4.2 3.5 - 5.2 g/dL    Bilirubin Total 0.5 <=1.2 mg/dL    GFR Estimate >90 >60 mL/min/1.73m2   CBC with platelets and differential     Status: None   Result Value Ref Range    WBC Count 6.9 4.0 - 11.0 10e3/uL    RBC Count 5.07 4.40 - 5.90 10e6/uL    Hemoglobin 15.3 13.3 - 17.7 g/dL    Hematocrit 45.5 40.0 - 53.0 %    MCV 90 78 - 100 fL    MCH 30.2 26.5 - 33.0 pg    MCHC 33.6 31.5 - 36.5 g/dL    RDW 13.4 10.0 - 15.0 %    Platelet Count 260 150 - 450 10e3/uL    % Neutrophils 52 %    % Lymphocytes 31 %    % Monocytes 14 %    % Eosinophils 3 %    % Basophils 0 %    % Immature Granulocytes 0 %    NRBCs per 100 WBC 0 <1 /100    Absolute Neutrophils 3.6 1.6 - 8.3 10e3/uL    Absolute Lymphocytes 2.1 0.8 - 5.3 10e3/uL    Absolute Monocytes 0.9 0.0 - 1.3 10e3/uL    Absolute Eosinophils 0.2 0.0 - 0.7 10e3/uL    Absolute Basophils 0.0 0.0 - 0.2 10e3/uL    Absolute Immature Granulocytes 0.0 <=0.4 10e3/uL    Absolute NRBCs 0.0 10e3/uL   CBC and Differential     Status: None    Narrative    The following orders were created for panel order CBC and Differential.  Procedure                               Abnormality         Status                     ---------                               -----------         ------                     CBC with platelets and  "d...[[id number]]                      Final result                 Please view results for these tests on the individual orders.       ASSESSMENT / PLAN:       ICD-10-CM    1. Cerebral palsy, unspecified type (H)  G80.9 Miscellaneous Order for DME - ONLY FOR DME      2. Encounter for Medicare annual wellness exam  Z00.00 CBC and Differential     CBC and Differential      3. Essential hypertension  I10 amLODIPine (NORVASC) 5 MG tablet     hydrochlorothiazide (HYDRODIURIL) 25 MG tablet     Comprehensive Metabolic Panel     Comprehensive Metabolic Panel      4. Overflow incontinence of urine  N39.490 tamsulosin (FLOMAX) 0.4 MG capsule      5. Special screening for malignant neoplasms, colon  Z12.11 COLOGUARD(EXACT SCIENCES)          Prescription DME written for a right ankle brace.  Also order Cologuard.  Prescriptions filled.  Blood pressure under good control.    The patient is ambulatory and has weakness and instability of the right ankle.  A AFO is indicated for this patient to stabilize for medical reasons.  The patient has a potential to benefit functionally from the orthosis.  The patient could not be fit with a prefab AFO as he needs stability in the frontal plane due to ankle instability.  The duration of wear of the arthrosis is lifetime.  Labs satisfactory.    COUNSELING:  Reviewed preventive health counseling, as reflected in patient instructions       Colon cancer screening      BMI:   Estimated body mass index is 38.93 kg/m  as calculated from the following:    Height as of this encounter: 1.708 m (5' 7.25\").    Weight as of this encounter: 113.6 kg (250 lb 6.4 oz).         He reports that he quit smoking about 37 years ago. He has quit using smokeless tobacco.      Appropriate preventive services were discussed with this patient, including applicable screening as appropriate for cardiovascular disease, diabetes, osteopenia/osteoporosis, and glaucoma.  As appropriate for age/gender, discussed screening for " colorectal cancer, prostate cancer, breast cancer, and cervical cancer. Checklist reviewing preventive services available has been given to the patient.    Reviewed patients plan of care and provided an AVS. The Basic Care Plan (routine screening as documented in Health Maintenance) for Jarad meets the Care Plan requirement. This Care Plan has been established and reviewed with the Patient and spouse.          Neal Enriquez MD  [facility] AND \[facility] [place] [facility]\""    Identified Health Risks:

## 2023-09-11 ENCOUNTER — LAB (OUTPATIENT)
Dept: FAMILY MEDICINE | Facility: OTHER | Age: 73
End: 2023-09-11
Payer: COMMERCIAL

## 2023-09-11 DIAGNOSIS — Z12.11 SPECIAL SCREENING FOR MALIGNANT NEOPLASMS, COLON: ICD-10-CM

## 2023-09-22 LAB — NONINV COLON CA DNA+OCC BLD SCRN STL QL: NEGATIVE

## 2023-10-02 ENCOUNTER — MEDICAL CORRESPONDENCE (OUTPATIENT)
Dept: HEALTH INFORMATION MANAGEMENT | Facility: OTHER | Age: 73
End: 2023-10-02
Payer: COMMERCIAL

## 2023-10-02 DIAGNOSIS — I10 ESSENTIAL HYPERTENSION: ICD-10-CM

## 2023-10-05 RX ORDER — AMLODIPINE BESYLATE 5 MG/1
5 TABLET ORAL DAILY
Qty: 90 TABLET | Refills: 4 | OUTPATIENT
Start: 2023-10-05

## 2023-10-25 ENCOUNTER — ALLIED HEALTH/NURSE VISIT (OUTPATIENT)
Dept: FAMILY MEDICINE | Facility: OTHER | Age: 73
End: 2023-10-25
Attending: FAMILY MEDICINE
Payer: MEDICARE

## 2023-10-25 DIAGNOSIS — Z23 HIGH PRIORITY FOR 2019-NCOV VACCINE: ICD-10-CM

## 2023-10-25 DIAGNOSIS — Z23 NEED FOR PROPHYLACTIC VACCINATION AND INOCULATION AGAINST INFLUENZA: Primary | ICD-10-CM

## 2023-10-25 PROCEDURE — 90662 IIV NO PRSV INCREASED AG IM: CPT

## 2023-10-25 PROCEDURE — 91320 SARSCV2 VAC 30MCG TRS-SUC IM: CPT

## 2023-10-25 NOTE — PROGRESS NOTES
We are scheduling all people aged 6 months and older for updated 8161-6330 COVID-19 vaccines.  Only-apartments [facility] will stock Pfizer COVID-19 vaccines in the clinics.  Patients who are up to date with COVID-19 vaccine will only need a single dose of the updated [id number] vaccine, at least 8 weeks after last dose. Unvaccinated patients or those not up to date may have a different dosing schedule.  To schedule a COVID-19 vaccine appointment, please log in to [employer] using this link to see when and where we have openings (to schedule a child s vaccine, you will need to log into their [employer] account).     [facility] retail pharmacies also administer Pfizer COVID Vaccines to patients 5 years and older. [facility] offer Novavax primary COVID-19 vaccine.  To schedule at a [facility] pharmacy please go to [email or web address].    To learn more about COVID-19 vaccines in general, please visit the CDC website: https://www.cdc.gov/coronavirus/2019-ncov/vaccines/index.html     If you have technical difficulty using [employer], call [phone], option 1 for assistance.    More information about vaccine effectiveness at reducing spread of disease, hospitalizations, and death as well as vaccine safety and answers to other questions can be found on our website: [email or web address]    Marlen Garibay LPN on 10/25/2023 at 9:59 AM

## 2023-11-13 ENCOUNTER — DOCUMENTATION ONLY (OUTPATIENT)
Dept: FAMILY MEDICINE | Facility: OTHER | Age: 73
End: 2023-11-13
Payer: COMMERCIAL

## 2023-11-13 DIAGNOSIS — G47.33 OBSTRUCTIVE SLEEP APNEA (ADULT) (PEDIATRIC): Primary | ICD-10-CM

## 2024-05-03 ENCOUNTER — HOSPITAL ENCOUNTER (OUTPATIENT)
Dept: GENERAL RADIOLOGY | Facility: OTHER | Age: 74
Discharge: HOME | End: 2024-05-03
Payer: MEDICARE

## 2024-05-03 ENCOUNTER — OFFICE VISIT (OUTPATIENT)
Dept: FAMILY MEDICINE | Facility: OTHER | Age: 74
End: 2024-05-03
Payer: MEDICARE

## 2024-05-03 VITALS
WEIGHT: 256.4 LBS | RESPIRATION RATE: 18 BRPM | SYSTOLIC BLOOD PRESSURE: 123 MMHG | OXYGEN SATURATION: 95 % | HEIGHT: 72 IN | DIASTOLIC BLOOD PRESSURE: 84 MMHG | HEART RATE: 73 BPM | TEMPERATURE: 98.5 F | BODY MASS INDEX: 34.73 KG/M2

## 2024-05-03 DIAGNOSIS — R09.89 CHEST CONGESTION: ICD-10-CM

## 2024-05-03 DIAGNOSIS — R53.83 FATIGUE, UNSPECIFIED TYPE: ICD-10-CM

## 2024-05-03 DIAGNOSIS — R49.0 HOARSENESS: ICD-10-CM

## 2024-05-03 DIAGNOSIS — R53.1 WEAKNESS: ICD-10-CM

## 2024-05-03 DIAGNOSIS — M62.81 GENERALIZED MUSCLE WEAKNESS: ICD-10-CM

## 2024-05-03 DIAGNOSIS — B34.9 VIRAL ILLNESS: Primary | ICD-10-CM

## 2024-05-03 DIAGNOSIS — R06.09 DYSPNEA ON EXERTION: ICD-10-CM

## 2024-05-03 DIAGNOSIS — H61.22 IMPACTED CERUMEN OF LEFT EAR: ICD-10-CM

## 2024-05-03 DIAGNOSIS — R52 GENERALIZED BODY ACHES: ICD-10-CM

## 2024-05-03 LAB
ANION GAP SERPL CALCULATED.3IONS-SCNC: 8 MMOL/L (ref 7–15)
BASOPHILS # BLD AUTO: 0 10E3/UL (ref 0–0.2)
BASOPHILS NFR BLD AUTO: 0 %
BUN SERPL-MCNC: 16.2 MG/DL (ref 8–23)
CALCIUM SERPL-MCNC: 9.1 MG/DL (ref 8.8–10.2)
CHLORIDE SERPL-SCNC: 99 MMOL/L (ref 98–107)
CREAT SERPL-MCNC: 0.78 MG/DL (ref 0.67–1.17)
DEPRECATED HCO3 PLAS-SCNC: 28 MMOL/L (ref 22–29)
EGFRCR SERPLBLD CKD-EPI 2021: >90 ML/MIN/1.73M2
EOSINOPHIL # BLD AUTO: 0.2 10E3/UL (ref 0–0.7)
EOSINOPHIL NFR BLD AUTO: 2 %
ERYTHROCYTE [DISTWIDTH] IN BLOOD BY AUTOMATED COUNT: 13.2 % (ref 10–15)
FLUAV RNA SPEC QL NAA+PROBE: NEGATIVE
FLUBV RNA RESP QL NAA+PROBE: NEGATIVE
GLUCOSE SERPL-MCNC: 108 MG/DL (ref 70–99)
HCT VFR BLD AUTO: 44 % (ref 40–53)
HGB BLD-MCNC: 14.6 G/DL (ref 13.3–17.7)
IMM GRANULOCYTES # BLD: 0 10E3/UL
IMM GRANULOCYTES NFR BLD: 0 %
LYMPHOCYTES # BLD AUTO: 1.8 10E3/UL (ref 0.8–5.3)
LYMPHOCYTES NFR BLD AUTO: 26 %
MCH RBC QN AUTO: 30.8 PG (ref 26.5–33)
MCHC RBC AUTO-ENTMCNC: 33.2 G/DL (ref 31.5–36.5)
MCV RBC AUTO: 93 FL (ref 78–100)
MONOCYTES # BLD AUTO: 0.7 10E3/UL (ref 0–1.3)
MONOCYTES NFR BLD AUTO: 10 %
NEUTROPHILS # BLD AUTO: 4.3 10E3/UL (ref 1.6–8.3)
NEUTROPHILS NFR BLD AUTO: 61 %
NRBC # BLD AUTO: 0 10E3/UL
NRBC BLD AUTO-RTO: 0 /100
PLATELET # BLD AUTO: 270 10E3/UL (ref 150–450)
POTASSIUM SERPL-SCNC: 3.8 MMOL/L (ref 3.4–5.3)
RBC # BLD AUTO: 4.74 10E6/UL (ref 4.4–5.9)
RSV RNA SPEC NAA+PROBE: NEGATIVE
SARS-COV-2 RNA RESP QL NAA+PROBE: NEGATIVE
SODIUM SERPL-SCNC: 135 MMOL/L (ref 135–145)
TROPONIN T SERPL HS-MCNC: 13 NG/L
WBC # BLD AUTO: 7 10E3/UL (ref 4–11)

## 2024-05-03 PROCEDURE — 85041 AUTOMATED RBC COUNT: CPT | Mod: ZL

## 2024-05-03 PROCEDURE — G0463 HOSPITAL OUTPT CLINIC VISIT: HCPCS | Mod: 25

## 2024-05-03 PROCEDURE — 84484 ASSAY OF TROPONIN QUANT: CPT | Mod: ZL

## 2024-05-03 PROCEDURE — 80048 BASIC METABOLIC PNL TOTAL CA: CPT | Mod: ZL

## 2024-05-03 PROCEDURE — 87637 SARSCOV2&INF A&B&RSV AMP PRB: CPT | Mod: ZL

## 2024-05-03 PROCEDURE — 36415 COLL VENOUS BLD VENIPUNCTURE: CPT | Mod: ZL

## 2024-05-03 PROCEDURE — 93010 ELECTROCARDIOGRAM REPORT: CPT | Performed by: STUDENT IN AN ORGANIZED HEALTH CARE EDUCATION/TRAINING PROGRAM

## 2024-05-03 PROCEDURE — 93005 ELECTROCARDIOGRAM TRACING: CPT

## 2024-05-03 PROCEDURE — 99214 OFFICE O/P EST MOD 30 MIN: CPT | Mod: 25

## 2024-05-03 PROCEDURE — 69210 REMOVE IMPACTED EAR WAX UNI: CPT

## 2024-05-03 PROCEDURE — 71046 X-RAY EXAM CHEST 2 VIEWS: CPT

## 2024-05-03 RX ORDER — PREDNISONE 20 MG/1
TABLET ORAL
Qty: 20 TABLET | Refills: 0 | Status: SHIPPED | OUTPATIENT
Start: 2024-05-03

## 2024-05-03 RX ORDER — ALBUTEROL SULFATE 90 UG/1
2 AEROSOL, METERED RESPIRATORY (INHALATION) EVERY 6 HOURS PRN
Qty: 18 G | Refills: 1 | Status: SHIPPED | OUTPATIENT
Start: 2024-05-03 | End: 2024-09-26

## 2024-05-03 ASSESSMENT — PAIN SCALES - GENERAL: PAINLEVEL: MILD PAIN (3)

## 2024-05-03 NOTE — PROGRESS NOTES
ASSESSMENT/PLAN:    I have reviewed the nursing notes.  I have reviewed the findings, diagnosis, plan and need for follow up with the patient.    1. Generalized body aches  2. Dyspnea on exertion  3. Chest congestion  4. Hoarseness  5. Weakness  6. Fatigue, unspecified type  7. Generalized muscle weakness    - XR Chest 2 Views- no discrete consolidation    - CBC and Differential- unremarkable results    - Basic Metabolic Panel- unremarkable results    - Troponin T, High Sensitivity- normal    - EKG 12-lead, tracing only- Normal EKG    - Symptomatic Influenza A/B, RSV, & SARS-CoV2 PCR (COVID-19) Nose- Negative results    8.  Viral illness    - albuterol (PROAIR HFA/PROVENTIL HFA/VENTOLIN HFA) 108 (90 Base) MCG/ACT inhaler; Inhale 2 puffs into the lungs every 6 hours as needed for shortness of breath, wheezing or cough  Dispense: 18 g; Refill: 1    - predniSONE (DELTASONE) 20 MG tablet; Take 3 tabs by mouth daily x 3 days, then 2 tabs daily x 3 days, then 1 tab daily x 3 days, then 1/2 tab daily x 3 days.  Dispense: 20 tablet; Refill: 0    9. Impacted cerumen of left ear    - REMOVE IMPACTED CERUMEN-  removed packed cerumen with curette by provider    - Discussed with patient that symptoms and exam are consistent with viral illness.  Discussed that symptomatic treatment is appropriate but not with antibiotics.      - Symptomatic treatment - Encouraged fluids, salt water gargles, honey (only if greater than 1 year in age due to risk of botulism), elevation, humidifier, sinus rinse/netti pot, lozenges, tea, topical vapor rub, popsicles, rest, etc     - May use over-the-counter Tylenol as needed for pain or fever    - Discussed warning signs/symptoms indicative of need to f/u    - Follow up if symptoms persist or worsen or concerns    - I explained my diagnostic considerations and recommendations to the patient, who voiced understanding and agreement with the treatment plan. All questions were answered. We discussed  potential side effects of any prescribed or recommended therapies, as well as expectations for response to treatments.    Ibeth Valdez, YODIT CNP  5/3/2024  2:46 PM    HPI:    Jarad Purvis is a 73 year old male who [facility] for concerns of body aches, shortness of breath and congestion for the past several weeks.  Approximately an hour ago patient stated that he had tingling in chin area for about 30 minutes.  States that he gets terribly congested in the mornings and has a hard time getting it up.  Denies chest pain or discomfort,     No past medical history on file.  Past Surgical History:   Procedure Laterality Date    CHOLECYSTECTOMY      No Comments Provided    COLONOSCOPY  2010    ESOPHAGOSCOPY, GASTROSCOPY, DUODENOSCOPY (EGD), COMBINED          EXTRACAPSULAR CATARACT EXTRATION WITH INTRAOCULAR LENS IMPLANT      rt    OTHER SURGICAL HISTORY      52492.1,ME LAP VENTRAL INCISIONAL HERNIORRAPHY,with mesh    OTHER SURGICAL HISTORY      62794.0,ME FUSION OF WRIST JOINT    OTHER SURGICAL HISTORY      283277,OTHER    OTHER SURGICAL HISTORY      988107,BONE MARROW ASPIRATION     Social History     Tobacco Use    Smoking status: Former     Current packs/day: 0.00     Types: Cigarettes     Quit date: 10/8/1985     Years since quittin.5    Smokeless tobacco: Former   Substance Use Topics    Alcohol use: Yes     Alcohol/week: 2.0 standard drinks of alcohol     Types: 2 Standard drinks or equivalent per week     Comment: occasional     Current Outpatient Medications   Medication Sig Dispense Refill    amLODIPine (NORVASC) 5 MG tablet Take 1 tablet (5 mg) by mouth daily 90 tablet 4    fluticasone (FLONASE) 50 MCG/ACT nasal spray Spray 2 sprays into both nostrils daily 16 g 11    hydrochlorothiazide (HYDRODIURIL) 25 MG tablet Take 1 tablet (25 mg) by mouth daily 90 tablet 4    loratadine (CLARITIN) 10 MG tablet Take 10 mg by mouth daily      Multiple Vitamins-Minerals  (MULTI-VITAMIN/MINERALS) TABS Take 1 tablet by mouth daily      multivitamin  with lutein (OCUVITE WITH LUTEIN) CAPS per capsule Take 1 capsule by mouth daily      order for DME Equipment being ordered: CPAP and supplies 1 Units 0    potassium 99 MG TABS       Sharps Container (SHARPS-A-GATOR LOCKING BRACKET) MISC CPAP, heated humidifier, mask, headgear, filters and tubing. For home use. Pressure: ?  Length of Need: lifetime      tamsulosin (FLOMAX) 0.4 MG capsule Take 1 capsule (0.4 mg) by mouth daily 90 capsule 4     Allergies   Allergen Reactions    Phenobarbital Hives     Past medical history, past surgical history, current medications and allergies reviewed and accurate to the best of my knowledge.      ROS:  Refer to HPI    /84 (BP Location: Right arm, Patient Position: Sitting, Cuff Size: Adult Large)   Pulse 73   Temp 98.5  F (36.9  C) (Temporal)   Resp 18   Ht 1.829 m (6')   Wt 116.3 kg (256 lb 6.4 oz)   SpO2 95%   BMI 34.77 kg/m      EXAM:  General Appearance: Well appearing 73 year old male, appropriate appearance for age. No acute distress   Ears: After removing excess cerumen with a curette,Left TM intact, translucent with bony landmarks appreciated, no erythema, no effusion, no bulging, no purulence.  Right TM intact, translucent with bony landmarks appreciated, no erythema, no effusion, no bulging, no purulence.  Left auditory canal clear.  Right auditory canal clear.  Normal external ears, non tender.  Eyes: conjunctivae normal without erythema or irritation, corneas clear, no drainage or crusting, no eyelid swelling, pupils equal   Oropharynx: moist mucous membranes, posterior pharynx with mild erythema, tonsils symmetric and 2+, mild erythema, no exudates or petechiae, no post nasal drip seen, no trismus, voice hoarse.    Sinuses:  No sinus tenderness upon palpation of the frontal or maxillary sinuses  Nose:  Bilateral nares: no erythema, no edema, no drainage or congestion   Neck:  supple without adenopathy  Respiratory: normal chest wall and respirations.  Normal effort.  Auscultation bilaterally, no wheezing or rhonchi.  Noted crackles lower right lobe.  No increased work of breathing.  No cough appreciated.  Cardiac: RRR with no murmurs  Abdomen: soft, nontender, no rigidity, no rebound tenderness or guarding, normal bowel sounds present  Musculoskeletal:  Equal movement of bilateral upper extremities.  Equal movement of bilateral lower extremities.  Normal gait.    Neuro: Alert and oriented to person, place, and time.  Cranial nerves grossly intact.  Psychological: normal affect, alert, oriented, and pleasant.     Labs: Xray:  Results for orders placed or performed in visit on 05/03/24   XR Chest 2 Views     Status: None    Narrative    PROCEDURE:  XR CHEST 2 VIEWS    HISTORY: Dyspnea on exertion; Chest congestion, .    COMPARISON:  8/29/2017    FINDINGS:  The cardiomediastinal contours are stable. The trachea is midline.  No focal consolidation, effusion or pneumothorax.    No suspicious osseous lesion or subdiaphragmatic free air.      Impression    IMPRESSION:      No discrete consolidation.      ELICEO ROBLEDO MD         SYSTEM ID:  [id number]   Basic Metabolic Panel     Status: Abnormal   Result Value Ref Range    Sodium 135 135 - 145 mmol/L    Potassium 3.8 3.4 - 5.3 mmol/L    Chloride 99 98 - 107 mmol/L    Carbon Dioxide (CO2) 28 22 - 29 mmol/L    Anion Gap 8 7 - 15 mmol/L    Urea Nitrogen 16.2 8.0 - 23.0 mg/dL    Creatinine 0.78 0.67 - 1.17 mg/dL    GFR Estimate >90 >60 mL/min/1.73m2    Calcium 9.1 8.8 - 10.2 mg/dL    Glucose 108 (H) 70 - 99 mg/dL   Troponin T, High Sensitivity     Status: Normal   Result Value Ref Range    Troponin T, High Sensitivity 13 <=22 ng/L   Symptomatic Influenza A/B, RSV, & SARS-CoV2 PCR (COVID-19) Nose     Status: Normal    Specimen: Nose; Swab   Result Value Ref Range    Influenza A PCR Negative Negative    Influenza B PCR Negative Negative    RSV  PCR Negative Negative    SARS CoV2 PCR Negative Negative    Narrative    Testing was performed using the Xpert Xpress CoV2/Flu/RSV Assay on the [employer] GeneXpert Instrument. This test should be ordered for the detection of SARS-CoV-2, influenza, and RSV viruses in individuals who meet clinical and/or epidemiological criteria. Test performance is unknown in asymptomatic patients. This test is for in vitro diagnostic use under the FDA EUA for laboratories certified under CLIA to perform high or moderate complexity testing. This test has not been FDA cleared or approved. A negative result does not rule out the presence of PCR inhibitors in the specimen or target RNA in concentration below the limit of detection for the assay. If only one viral target is positive but coinfection with multiple targets is suspected, the sample should be re-tested with another FDA cleared, approved, or authorized test, if coinfection would change clinical management. This test was validated by the [facility]. These laboratories are certified under the Clinical Laboratory Improvement Amendments of 1988 (CLIA-88) as qualified to perform high complexity laboratory testing.   CBC with platelets and differential     Status: None   Result Value Ref Range    WBC Count 7.0 4.0 - 11.0 10e3/uL    RBC Count 4.74 4.40 - 5.90 10e6/uL    Hemoglobin 14.6 13.3 - 17.7 g/dL    Hematocrit 44.0 40.0 - 53.0 %    MCV 93 78 - 100 fL    MCH 30.8 26.5 - 33.0 pg    MCHC 33.2 31.5 - 36.5 g/dL    RDW 13.2 10.0 - 15.0 %    Platelet Count 270 150 - 450 10e3/uL    % Neutrophils 61 %    % Lymphocytes 26 %    % Monocytes 10 %    % Eosinophils 2 %    % Basophils 0 %    % Immature Granulocytes 0 %    NRBCs per 100 WBC 0 <1 /100    Absolute Neutrophils 4.3 1.6 - 8.3 10e3/uL    Absolute Lymphocytes 1.8 0.8 - 5.3 10e3/uL    Absolute Monocytes 0.7 0.0 - 1.3 10e3/uL    Absolute Eosinophils 0.2 0.0 - 0.7 10e3/uL    Absolute Basophils 0.0 0.0 - 0.2 10e3/uL     Absolute Immature Granulocytes 0.0 <=0.4 10e3/uL    Absolute NRBCs 0.0 10e3/uL   CBC and Differential     Status: None    Narrative    The following orders were created for panel order CBC and Differential.  Procedure                               Abnormality         Status                     ---------                               -----------         ------                     CBC with platelets and d...[[id number]]                      Final result                 Please view results for these tests on the individual orders.

## 2024-05-03 NOTE — NURSING NOTE
Chief Complaint   Patient presents with    Generalized Body Aches     Present for since this early afternoon    Fatigue    Shortness of Breath     Patient states that he last took ibuprofen this morning for his shoulder before the burping, shortness of breath and fatigue started. Patient states that he has had a cold for a few weeks but is slowly getting better.     Patient presents with wife, Dorothy Esparza.    Initial /84 (BP Location: Right arm, Patient Position: Sitting, Cuff Size: Adult Large)   Pulse 73   Temp 98.5  F (36.9  C) (Temporal)   Resp 18   Ht 1.829 m (6')   Wt 116.3 kg (256 lb 6.4 oz)   SpO2 95%   BMI 34.77 kg/m   Estimated body mass index is 34.77 kg/m  as calculated from the following:    Height as of this encounter: 1.829 m (6').    Weight as of this encounter: 116.3 kg (256 lb 6.4 oz).       FOOD SECURITY SCREENING QUESTIONS:    The next two questions are to help us understand your food security.  If you are feeling you need any assistance in this area, we have resources available to support you today.    Hunger Vital Signs:  Within the past 12 months we worried whether our food would run out before we got money to buy more. Never  Within the past 12 months the food we bought just didn't last and we didn't have money to get more. Never  Florecita Carrasco LPN on 5/3/2024 at 2:32 PM     Florecita Carrasco

## 2024-05-14 LAB
ATRIAL RATE - MUSE: 62 BPM
DIASTOLIC BLOOD PRESSURE - MUSE: NORMAL MMHG
INTERPRETATION ECG - MUSE: NORMAL
P AXIS - MUSE: 57 DEGREES
PR INTERVAL - MUSE: 234 MS
QRS DURATION - MUSE: 96 MS
QT - MUSE: 398 MS
QTC - MUSE: 403 MS
R AXIS - MUSE: 34 DEGREES
SYSTOLIC BLOOD PRESSURE - MUSE: NORMAL MMHG
T AXIS - MUSE: 36 DEGREES
VENTRICULAR RATE- MUSE: 62 BPM

## 2024-06-29 ENCOUNTER — OFFICE VISIT (OUTPATIENT)
Dept: FAMILY MEDICINE | Facility: OTHER | Age: 74
End: 2024-06-29
Attending: NURSE PRACTITIONER
Payer: COMMERCIAL

## 2024-06-29 VITALS
HEIGHT: 72 IN | DIASTOLIC BLOOD PRESSURE: 72 MMHG | TEMPERATURE: 97.3 F | HEART RATE: 70 BPM | SYSTOLIC BLOOD PRESSURE: 128 MMHG | OXYGEN SATURATION: 95 % | BODY MASS INDEX: 33.46 KG/M2 | WEIGHT: 247 LBS | RESPIRATION RATE: 16 BRPM

## 2024-06-29 DIAGNOSIS — L20.9 ATOPIC DERMATITIS, UNSPECIFIED TYPE: Primary | ICD-10-CM

## 2024-06-29 PROCEDURE — G0463 HOSPITAL OUTPT CLINIC VISIT: HCPCS

## 2024-06-29 PROCEDURE — 99213 OFFICE O/P EST LOW 20 MIN: CPT | Performed by: REGISTERED NURSE

## 2024-06-29 RX ORDER — TRIAMCINOLONE ACETONIDE 1 MG/G
CREAM TOPICAL 2 TIMES DAILY
Qty: 30 G | Refills: 0 | Status: SHIPPED | OUTPATIENT
Start: 2024-06-29 | End: 2024-07-06

## 2024-06-29 ASSESSMENT — PAIN SCALES - GENERAL: PAINLEVEL: NO PAIN (0)

## 2024-06-29 NOTE — PROGRESS NOTES
Jarad Purvis  1950    ASSESSMENT/PLAN:   1. Atopic dermatitis, unspecified type     Will start treatment with triamcinolone.  This erythema is not painful but pruritic.  This likely does not represents cellulitis.  He was instructed to monitor the redness and follow-up if it begins to worsen or starts weeping.    - triamcinolone (KENALOG) 0.1 % external cream; Apply topically 2 times daily for 7 days  Dispense: 30 g; Refill: 0     Patient agrees with plan of care and verbalizes understating. AVS printed. Patient education provided verbally and written instructions provided as requested. Patient made aware of emergent signs and symptoms to monitor for and when to seek additional care/follow up.     SUBJECTIVE:   CHIEF COMPLAINT/ REASON FOR VISIT  Patient presents with:  Derm Problem: On R calf x 3 days     HISTORY OF PRESENT ILLNESS  Jarad Purvis is a pleasant 74 year old male [facility] of a rash that started 3 days ago to his right lower ankle.  He does wear a ankle brace to this ankle for stabilization.  Rash seems better in the morning and worsens at night.  He has extensive lower extremity edema.  This is not new.  Rash is pruritic, not painful.  Denies fevers, chills.    Of note he did have a tick bite, to his right lower beltline several weeks ago.  No surrounding erythema at this time.  He is uncertain if this was a dog tick or deer tick.      I have reviewed the nursing notes.  I have reviewed allergies, medication list, problem list, and past medical history.    REVIEW OF SYSTEMS  See HPI    VITAL SIGNS  Vitals:    06/29/24 1123   BP: 128/72   BP Location: Left arm   Patient Position: Sitting   Cuff Size: Adult Large   Pulse: 70   Resp: 16   Temp: 97.3  F (36.3  C)   TempSrc: Tympanic   SpO2: 95%   Weight: 112 kg (247 lb)   Height: 1.829 m (6')      Body mass index is 33.5 kg/m .    OBJECTIVE:   PHYSICAL EXAM  Physical Exam  Constitutional:       Appearance: He is  not ill-appearing.   Musculoskeletal:      Right lower le+ Edema present.      Left lower le+ Edema present.   Skin:     Findings: Rash (right lower ankle, see photo documentation) present.   Neurological:      Mental Status: He is alert.                  DIAGNOSTICS  No results found for any visits on 24.     YODIT Jones [facility]

## 2024-08-20 DIAGNOSIS — R06.09 DYSPNEA ON EXERTION: ICD-10-CM

## 2024-08-21 ENCOUNTER — DOCUMENTATION ONLY (OUTPATIENT)
Dept: FAMILY MEDICINE | Facility: OTHER | Age: 74
End: 2024-08-21
Payer: COMMERCIAL

## 2024-08-21 DIAGNOSIS — G47.33 OBSTRUCTIVE SLEEP APNEA (ADULT) (PEDIATRIC): Primary | ICD-10-CM

## 2024-08-23 ENCOUNTER — OFFICE VISIT (OUTPATIENT)
Dept: FAMILY MEDICINE | Facility: OTHER | Age: 74
End: 2024-08-23
Attending: NURSE PRACTITIONER
Payer: COMMERCIAL

## 2024-08-23 VITALS
DIASTOLIC BLOOD PRESSURE: 94 MMHG | HEIGHT: 72 IN | HEART RATE: 56 BPM | OXYGEN SATURATION: 95 % | SYSTOLIC BLOOD PRESSURE: 156 MMHG | WEIGHT: 244 LBS | BODY MASS INDEX: 33.05 KG/M2 | RESPIRATION RATE: 20 BRPM

## 2024-08-23 DIAGNOSIS — I10 PRIMARY HYPERTENSION: ICD-10-CM

## 2024-08-23 DIAGNOSIS — R21 RASH AND NONSPECIFIC SKIN ERUPTION: Primary | ICD-10-CM

## 2024-08-23 PROCEDURE — 99213 OFFICE O/P EST LOW 20 MIN: CPT | Performed by: NURSE PRACTITIONER

## 2024-08-23 PROCEDURE — G0463 HOSPITAL OUTPT CLINIC VISIT: HCPCS

## 2024-08-23 RX ORDER — CLOTRIMAZOLE 1 %
CREAM (GRAM) TOPICAL 2 TIMES DAILY
Qty: 30 G | Refills: 1 | Status: SHIPPED | OUTPATIENT
Start: 2024-08-23

## 2024-08-23 ASSESSMENT — PAIN SCALES - GENERAL: PAINLEVEL: NO PAIN (0)

## 2024-08-23 NOTE — NURSING NOTE
Patient presents today for follow up on rash on lower leg.    Medication Reconciliation Complete    Taylor Das LPN  8/23/2024 2:34 PM

## 2024-08-23 NOTE — PROGRESS NOTES
Assessment & Plan   Problem List Items Addressed This Visit          Circulatory    Hypertension     Other Visit Diagnoses       Rash and nonspecific skin eruption    -  Primary    Relevant Medications    clotrimazole (LOTRIMIN) 1 % external cream        Blood pressure is not at goal, recommend monitoring this at home and following up with primary care provider, he does have an appointment in 1 month to see primary care provider.    I suspect fungal infection to ankle given the use of brace, sweating and increased moisture.  Will have him do a trial of clotrimazole cream, keep the area clean and dry, wash twice daily.  Follow-up with primary care provider in 1 month, sooner if concerns.      No follow-ups on file.      Subjective   Ang is a 74 year old, presenting for the following health issues:  Derm Problem    History of Present Illness       Reason for visit:  Rash    He eats 2-3 servings of fruits and vegetables daily.He consumes 0 sweetened beverage(s) daily.He exercises with enough effort to increase his heart rate 10 to 19 minutes per day.  He exercises with enough effort to increase his heart rate 4 days per week.   He is taking medications regularly.     He presents to clinic today for persistent rash to his right ankle.  [facility], treated with steroid cream which seemed to get better but then worsened.  He does wear an ankle brace often.  Rash is somewhat itchy at times, has not had any drainage.  Worried that he may have an infection.        Objective    BP (!) 154/96   Pulse 56   Resp 20   Ht 1.829 m (6')   Wt 110.7 kg (244 lb)   SpO2 95%   BMI 33.09 kg/m    Body mass index is 33.09 kg/m .  Physical Exam   GENERAL: alert and no distress  EYES: Eyes grossly normal to inspection  RESP: lungs clear to auscultation - no rales, rhonchi or wheezes  CV: regular rate and rhythm, normal S1 S2  SKIN: Right ankle with light pink rash noted on bilateral sides  of ankle and lower calf/shin region.  No pustules or drainage appreciated.  Several small satellite lesions noted.  NEURO: Normal strength and tone, mentation intact and speech normal  PSYCH: mentation appears normal, affect normal/bright            Signed Electronically by: YODIT Perkins CNP

## 2024-09-26 ENCOUNTER — OFFICE VISIT (OUTPATIENT)
Dept: FAMILY MEDICINE | Facility: OTHER | Age: 74
End: 2024-09-26
Attending: FAMILY MEDICINE
Payer: MEDICARE

## 2024-09-26 VITALS
WEIGHT: 244.6 LBS | RESPIRATION RATE: 20 BRPM | HEART RATE: 86 BPM | HEIGHT: 72 IN | SYSTOLIC BLOOD PRESSURE: 128 MMHG | DIASTOLIC BLOOD PRESSURE: 78 MMHG | TEMPERATURE: 97.6 F | BODY MASS INDEX: 33.13 KG/M2 | OXYGEN SATURATION: 95 %

## 2024-09-26 DIAGNOSIS — R06.09 DYSPNEA ON EXERTION: ICD-10-CM

## 2024-09-26 DIAGNOSIS — H91.93 BILATERAL HEARING LOSS, UNSPECIFIED HEARING LOSS TYPE: ICD-10-CM

## 2024-09-26 DIAGNOSIS — Z00.00 ENCOUNTER FOR MEDICARE ANNUAL WELLNESS EXAM: Primary | ICD-10-CM

## 2024-09-26 DIAGNOSIS — I10 ESSENTIAL HYPERTENSION: ICD-10-CM

## 2024-09-26 DIAGNOSIS — N39.490 OVERFLOW INCONTINENCE OF URINE: ICD-10-CM

## 2024-09-26 PROBLEM — J01.40 ACUTE NON-RECURRENT PANSINUSITIS: Status: ACTIVE | Noted: 2024-09-26

## 2024-09-26 LAB
ALBUMIN SERPL BCG-MCNC: 4.3 G/DL (ref 3.5–5.2)
ALP SERPL-CCNC: 84 U/L (ref 40–150)
ALT SERPL W P-5'-P-CCNC: 20 U/L (ref 0–70)
ANION GAP SERPL CALCULATED.3IONS-SCNC: 10 MMOL/L (ref 7–15)
AST SERPL W P-5'-P-CCNC: 27 U/L (ref 0–45)
BASOPHILS # BLD AUTO: 0 10E3/UL (ref 0–0.2)
BASOPHILS NFR BLD AUTO: 1 %
BILIRUB SERPL-MCNC: 0.4 MG/DL
BUN SERPL-MCNC: 12.1 MG/DL (ref 8–23)
CALCIUM SERPL-MCNC: 9.4 MG/DL (ref 8.8–10.4)
CHLORIDE SERPL-SCNC: 102 MMOL/L (ref 98–107)
CREAT SERPL-MCNC: 0.77 MG/DL (ref 0.67–1.17)
EGFRCR SERPLBLD CKD-EPI 2021: >90 ML/MIN/1.73M2
EOSINOPHIL # BLD AUTO: 0.2 10E3/UL (ref 0–0.7)
EOSINOPHIL NFR BLD AUTO: 3 %
ERYTHROCYTE [DISTWIDTH] IN BLOOD BY AUTOMATED COUNT: 13.2 % (ref 10–15)
GLUCOSE SERPL-MCNC: 95 MG/DL (ref 70–99)
HCO3 SERPL-SCNC: 26 MMOL/L (ref 22–29)
HCT VFR BLD AUTO: 44.1 % (ref 40–53)
HGB BLD-MCNC: 14.7 G/DL (ref 13.3–17.7)
IMM GRANULOCYTES # BLD: 0 10E3/UL
IMM GRANULOCYTES NFR BLD: 0 %
LYMPHOCYTES # BLD AUTO: 1.6 10E3/UL (ref 0.8–5.3)
LYMPHOCYTES NFR BLD AUTO: 26 %
MCH RBC QN AUTO: 30.7 PG (ref 26.5–33)
MCHC RBC AUTO-ENTMCNC: 33.3 G/DL (ref 31.5–36.5)
MCV RBC AUTO: 92 FL (ref 78–100)
MONOCYTES # BLD AUTO: 0.6 10E3/UL (ref 0–1.3)
MONOCYTES NFR BLD AUTO: 10 %
NEUTROPHILS # BLD AUTO: 3.7 10E3/UL (ref 1.6–8.3)
NEUTROPHILS NFR BLD AUTO: 60 %
NRBC # BLD AUTO: 0 10E3/UL
NRBC BLD AUTO-RTO: 0 /100
PLATELET # BLD AUTO: 280 10E3/UL (ref 150–450)
POTASSIUM SERPL-SCNC: 4 MMOL/L (ref 3.4–5.3)
PROT SERPL-MCNC: 7.5 G/DL (ref 6.4–8.3)
RBC # BLD AUTO: 4.79 10E6/UL (ref 4.4–5.9)
SODIUM SERPL-SCNC: 138 MMOL/L (ref 135–145)
WBC # BLD AUTO: 6.2 10E3/UL (ref 4–11)

## 2024-09-26 PROCEDURE — 36415 COLL VENOUS BLD VENIPUNCTURE: CPT | Mod: ZL | Performed by: FAMILY MEDICINE

## 2024-09-26 PROCEDURE — G0463 HOSPITAL OUTPT CLINIC VISIT: HCPCS | Mod: 25

## 2024-09-26 PROCEDURE — 85025 COMPLETE CBC W/AUTO DIFF WBC: CPT | Mod: ZL | Performed by: FAMILY MEDICINE

## 2024-09-26 PROCEDURE — 90480 ADMN SARSCOV2 VAC 1/ONLY CMP: CPT

## 2024-09-26 PROCEDURE — 82374 ASSAY BLOOD CARBON DIOXIDE: CPT | Mod: ZL | Performed by: FAMILY MEDICINE

## 2024-09-26 PROCEDURE — 90662 IIV NO PRSV INCREASED AG IM: CPT

## 2024-09-26 PROCEDURE — 82040 ASSAY OF SERUM ALBUMIN: CPT | Mod: ZL | Performed by: FAMILY MEDICINE

## 2024-09-26 RX ORDER — AMLODIPINE BESYLATE 5 MG/1
5 TABLET ORAL DAILY
Qty: 90 TABLET | Refills: 4 | Status: SHIPPED | OUTPATIENT
Start: 2024-09-26

## 2024-09-26 RX ORDER — TAMSULOSIN HYDROCHLORIDE 0.4 MG/1
0.4 CAPSULE ORAL DAILY
Qty: 90 CAPSULE | Refills: 4 | Status: SHIPPED | OUTPATIENT
Start: 2024-09-26

## 2024-09-26 RX ORDER — HYDROCHLOROTHIAZIDE 25 MG/1
25 TABLET ORAL DAILY
Qty: 90 TABLET | Refills: 4 | Status: SHIPPED | OUTPATIENT
Start: 2024-09-26

## 2024-09-26 RX ORDER — ALBUTEROL SULFATE 90 UG/1
2 AEROSOL, METERED RESPIRATORY (INHALATION) EVERY 6 HOURS PRN
Qty: 18 G | Refills: 5 | Status: SHIPPED | OUTPATIENT
Start: 2024-09-26

## 2024-09-26 ASSESSMENT — PAIN SCALES - GENERAL: PAINLEVEL: NO PAIN (0)

## 2024-09-26 NOTE — NURSING NOTE
Patient here for annual wellness visit and medication refills. Last eye 2019 and last dental exam within 6  month.  Medication Reconciliation: complete.    Devi Vivar LPN  9/26/2024 1:11 PM

## 2024-09-26 NOTE — PROGRESS NOTES
Preventive Care Visit  [facility]  Neal Enriquez MD, Family Medicine  Sep 26, 2024      Assessment & Plan     Encounter for Medicare annual wellness exam  Satisfactory up-to-date on preventive services.    Dyspnea on exertion  Continue  - albuterol (PROAIR HFA/PROVENTIL HFA/VENTOLIN HFA) 108 (90 Base) MCG/ACT inhaler; Inhale 2 puffs into the lungs every 6 hours as needed for shortness of breath, wheezing or cough.    Essential hypertension  Under good control  - amLODIPine (NORVASC) 5 MG tablet; Take 1 tablet (5 mg) by mouth daily.  - hydrochlorothiazide (HYDRODIURIL) 25 MG tablet; Take 1 tablet (25 mg) by mouth daily.  - Comprehensive Metabolic Panel; Future  - CBC and Differential; Future  - Comprehensive Metabolic Panel  - CBC and Differential    Overflow incontinence of urine  Continue  - tamsulosin (FLOMAX) 0.4 MG capsule; Take 1 capsule (0.4 mg) by mouth daily.    Bilateral hearing loss, unspecified hearing loss type  Audiology.  I discussed with him that he should bring his hearing aid in.  It may not be the most appropriate one for him.  He is willing to discuss replacements if needed.  - Adult Audiology  Referral; Future            BMI  Estimated body mass index is 33.17 kg/m  as calculated from the following:    Height as of this encounter: 1.829 m (6').    Weight as of this encounter: 110.9 kg (244 lb 9.6 oz).       Counseling  Appropriate preventive services were addressed with this patient via screening, questionnaire, or discussion as appropriate for fall prevention, nutrition, physical activity, Tobacco-use cessation, social engagement, weight loss and cognition.  Checklist reviewing preventive services available has been given to the patient.  Reviewed patient's diet, addressing concerns and/or questions.   The patient was provided with written information regarding signs of hearing loss.           No follow-ups on file.    Tala Fry is a 74 year old,  presenting for the following:  Medicare Visit          Health Care Directive  Patient has a Health Care Directive on file  Advance care planning document is on file and is current.    Patient arrives here for Medicare wellness.  He reports for little incontinence he is doing well.  He does have problems with hearing.  Especially in loud crowds.  He does have a hearing aid from another individual.  But does not know if it is working as a supposed to.  He has never had hearing test.                  9/26/2024   General Health   How would you rate your overall physical health? Good   Feel stress (tense, anxious, or unable to sleep) To some extent      (!) STRESS CONCERN      9/26/2024   Nutrition   Diet: Regular (no restrictions)            9/26/2024   Exercise   Days per week of moderate/strenous exercise 4 days            9/26/2024   Social Factors   Frequency of gathering with friends or relatives Once a week   Worry food won't last until get money to buy more No   Food not last or not have enough money for food? No   Do you have housing? (Housing is defined as stable permanent housing and does not include staying ouside in a car, in a tent, in an abandoned building, in an overnight shelter, or couch-surfing.) Yes   Are you worried about losing your housing? No   Lack of transportation? No   Unable to get utilities (heat,electricity)? No            9/26/2024   Fall Risk   Fallen 2 or more times in the past year? No   Trouble with walking or balance? Yes   Reason Gait Speed Test Not Completed Patient declines             9/26/2024   Activities of Daily Living- Home Safety   Needs help with the following daily activites None of the above   Safety concerns in the home None of the above            9/26/2024   Dental   Dentist two times every year? Yes            9/26/2024   Hearing Screening   Hearing concerns? (!) IT'S HARD TO FOLLOW A CONVERSATION IN A NOISY RESTAURANT OR CROWDED ROOM.    (!) TROUBLE UNDERSTANDING  SOFT OR WHISPERED SPEECH.       Multiple values from one day are sorted in reverse-chronological order         2024   Driving Risk Screening   Patient/family members have concerns about driving No            2024   General Alertness/Fatigue Screening   Have you been more tired than usual lately? No            2024   Urinary Incontinence Screening   Bothered by leaking urine in past 6 months No            2024   TB Screening   Were you born outside of the [place]? No              Today's PHQ-2 Score:       2024    11:23 AM   PHQ-2 (  [employer])   Q1: Little interest or pleasure in doing things 0   Q2: Feeling down, depressed or hopeless 0   PHQ-2 Score 0   Q1: Little interest or pleasure in doing things Not at all   Q2: Feeling down, depressed or hopeless Not at all   PHQ-2 Score 0         2024   Substance Use   Alcohol more than 3/day or more than 7/wk No   Do you have a current opioid prescription? No   How severe/bad is pain from 1 to 10? 2/10   Do you use any other substances recreationally? No        Social History     Tobacco Use    Smoking status: Former     Current packs/day: 0.00     Types: Cigarettes     Quit date: 10/8/1985     Years since quittin.9    Smokeless tobacco: Former   Vaping Use    Vaping status: Never Used   Substance Use Topics    Alcohol use: Yes     Alcohol/week: 2.0 standard drinks of alcohol     Types: 2 Standard drinks or equivalent per week     Comment: occasional    Drug use: No       ASCVD Risk   The 10-year ASCVD risk score (Marla FLORES, et al., 2019) is: 28.7%    Values used to calculate the score:      Age: 74 years      Sex: Male      Is Non- : No      Diabetic: No      Tobacco smoker: No      Systolic Blood Pressure: 128 mmHg      Is BP treated: Yes      HDL Cholesterol: 42 mg/dL      Total Cholesterol: 141 mg/dL            Reviewed and updated as needed this visit by Provider                      Current providers  sharing in care for this patient include:  Patient Care Team:  Neal Enriquez MD as PCP - General (Family Practice)  Neal Enriquez MD as Assigned PCP    The following health maintenance items are reviewed in Epic and correct as of today:  Health Maintenance   Topic Date Due    RSV VACCINE (1 - 1-dose 75+ series) Never done    INFLUENZA VACCINE (1) 09/01/2024    COVID-19 Vaccine (7 - 2024-25 season) 09/01/2024    MEDICARE ANNUAL WELLNESS VISIT  08/28/2024    BMP  05/03/2025    LIPID  07/14/2025    FALL RISK ASSESSMENT  09/26/2025    GLUCOSE  05/03/2027    ADVANCE CARE PLANNING  10/18/2028    DTAP/TDAP/TD IMMUNIZATION (2 - Td or Tdap) 07/14/2030    HEPATITIS C SCREENING  Completed    PHQ-2 (once per calendar year)  Completed    Pneumococcal Vaccine: 65+ Years  Completed    ZOSTER IMMUNIZATION  Completed    HPV IMMUNIZATION  Aged Out    MENINGITIS IMMUNIZATION  Aged Out    RSV MONOCLONAL ANTIBODY  Aged Out    COLORECTAL CANCER SCREENING  Discontinued            Objective    Exam  /78   Pulse 86   Temp 97.6  F (36.4  C)   Resp 20   Ht 1.829 m (6')   Wt 110.9 kg (244 lb 9.6 oz)   SpO2 95%   BMI 33.17 kg/m     Estimated body mass index is 33.17 kg/m  as calculated from the following:    Height as of this encounter: 1.829 m (6').    Weight as of this encounter: 110.9 kg (244 lb 9.6 oz).    Physical Exam  Constitutional:       Appearance: Normal appearance.   HENT:      Head: Normocephalic.      Mouth/Throat:      Mouth: Mucous membranes are moist.   Cardiovascular:      Rate and Rhythm: Normal rate and regular rhythm.   Pulmonary:      Effort: Pulmonary effort is normal.      Breath sounds: Normal breath sounds.   Neurological:      Mental Status: He is alert.   Psychiatric:         Mood and Affect: Mood normal.                No data to display                       Signed Electronically by: Neal Enriquez MD

## 2024-11-18 RX ORDER — ALBUTEROL SULFATE 90 UG/1
2 INHALANT RESPIRATORY (INHALATION) EVERY 6 HOURS PRN
Qty: 18 G | Refills: 1 | OUTPATIENT
Start: 2024-11-18

## 2024-12-05 ENCOUNTER — OFFICE VISIT (OUTPATIENT)
Dept: FAMILY MEDICINE | Facility: OTHER | Age: 74
End: 2024-12-05
Attending: FAMILY MEDICINE
Payer: MEDICARE

## 2024-12-05 VITALS
TEMPERATURE: 97.8 F | BODY MASS INDEX: 33.66 KG/M2 | DIASTOLIC BLOOD PRESSURE: 70 MMHG | HEART RATE: 88 BPM | RESPIRATION RATE: 20 BRPM | OXYGEN SATURATION: 95 % | SYSTOLIC BLOOD PRESSURE: 124 MMHG | WEIGHT: 248.2 LBS

## 2024-12-05 DIAGNOSIS — L30.9 ECZEMA, UNSPECIFIED TYPE: Primary | ICD-10-CM

## 2024-12-05 PROCEDURE — G0463 HOSPITAL OUTPT CLINIC VISIT: HCPCS

## 2024-12-05 RX ORDER — TRIAMCINOLONE ACETONIDE 5 MG/G
CREAM TOPICAL 2 TIMES DAILY
Qty: 30 G | Refills: 3 | Status: SHIPPED | OUTPATIENT
Start: 2024-12-05

## 2024-12-05 ASSESSMENT — PAIN SCALES - GENERAL: PAINLEVEL_OUTOF10: NO PAIN (1)

## 2024-12-05 NOTE — NURSING NOTE
Patient here for [facility] follow up for derm on the lower extremely. He notes edema in the lower extremities. He does have ROSALINO socks that came in the mail and has not used them.  Medication Reconciliation: complete.    Devi Vivar LPN  12/5/2024 11:24 AM

## 2024-12-05 NOTE — PROGRESS NOTES
SUBJECTIVE:   Jarad Purvis is a 74 year old male who presents to clinic today for the following health issues: Patient arrives here for follow-up rash.  Over his right lower extremity.  It were his top of his brace rubs against the skin.  He has been to the rapid clinic a couple of times.  He has tried lotions Lotrimin.  All without much improvement.  He recently did get ROSALINO stockings but has not started them yet.    History of Present Illness       Reason for visit:  Rash follow up    He eats 2-3 servings of fruits and vegetables daily.He consumes 1 sweetened beverage(s) daily.He exercises with enough effort to increase his heart rate 10 to 19 minutes per day.  He exercises with enough effort to increase his heart rate 3 or less days per week.   He is taking medications regularly.          Review of Systems     OBJECTIVE:     /70   Pulse 88   Temp 97.8  F (36.6  C)   Resp 20   Wt 112.6 kg (248 lb 3.2 oz)   SpO2 95%   BMI 33.66 kg/m    Body mass index is 33.66 kg/m .  Physical Exam  Skin:     Comments: Skin is irritated.  Slightly red.  Eczema versus a drug reaction to the leather on his brace.   Neurological:      Mental Status: He is alert.   Psychiatric:         Mood and Affect: Mood normal.         Diagnostic Test Results:  none     ASSESSMENT/PLAN:         (L30.9) Eczema, unspecified type  (primary encounter diagnosis) versus a reaction to the tendons of his leather brace.  Will try triamcinolone.  Comment: Follow-up if not improved.  Advised patient the redness might not resolve completely.  Rash has been going on since August.  Plan: triamcinolone (ARISTOCORT HP) 0.5 % external         cream  The longitudinal plan of care for the diagnosis(es)/condition(s) as documented were addressed during this visit. Due to the added complexity in care, I will continue to support Ang in the subsequent management and with ongoing continuity of care.        Neal Enriquez MD  [facility] AND  [facility]

## 2024-12-10 ENCOUNTER — OFFICE VISIT (OUTPATIENT)
Dept: OTOLARYNGOLOGY | Facility: OTHER | Age: 74
End: 2024-12-10
Attending: OTOLARYNGOLOGY
Payer: MEDICARE

## 2024-12-10 DIAGNOSIS — H90.3 SENSORINEURAL HEARING LOSS (SNHL) OF BOTH EARS: Primary | ICD-10-CM

## 2024-12-10 PROCEDURE — G0463 HOSPITAL OUTPT CLINIC VISIT: HCPCS

## 2024-12-16 NOTE — PROGRESS NOTES
document embedded image                                   [facility]                                                                                                                                         Patient Name: Jarad Purvis   Address: [address] [place] [address]    YOB: 1950   [place] [id number]   MR Number: [id number]   Phone: [phone]  PCP: Neal Enriquez MD           Appointment Date: 12/10/24  Visit Provider: Yousuf Cano MD    cc: ~    ENT Progress Note        Intake  Visit Reasons: Liebe; hearing test    HPI  History of Present Illness  Chief complaint:  Hearing loss    History  The patient is a 74-year-old man who comes to the office today with complaints of difficulty hearing soft-spoken voices and difficulty in background noise.  His hearing history is unremarkable.  He has never had any otologic surgery.  He denies seeing chronic recurring ear infections.  He has had no excessive noise exposure.  He denies trauma to his head or ears.  He was no history consistent with ototoxicity.      Exam  The external auditory canals and TMs are clear bilaterally   The remainder of the head neck exam is unremarkable   Audiogram-sloping sensorineural loss bilaterally with speech reception threshold of 35 decibels on the right and 30 decibels on the left.    A&P  Assessment & Plan  (1) Sensorineural hearing loss (SNHL) of both ears:         Status: Acute        Code(s):  H90.3 - Sensorineural hearing loss, bilateral  The patient has some old hearing aids from a family member in our audiologist will try to tuning these to his loss to see if he can get some benefit.                Yousuf Cano MD    Filed: 12/11/24 0979      <Electronically signed by Yousuf Cano MD> 12/11/24 [id number]

## 2025-01-28 ENCOUNTER — APPOINTMENT (OUTPATIENT)
Dept: OTOLARYNGOLOGY | Facility: OTHER | Age: 75
End: 2025-01-28
Attending: FAMILY MEDICINE
Payer: COMMERCIAL

## 2025-02-24 ENCOUNTER — APPOINTMENT (OUTPATIENT)
Dept: GENERAL RADIOLOGY | Facility: OTHER | Age: 75
End: 2025-02-24
Attending: STUDENT IN AN ORGANIZED HEALTH CARE EDUCATION/TRAINING PROGRAM
Payer: MEDICARE

## 2025-02-24 ENCOUNTER — ALLIED HEALTH/NURSE VISIT (OUTPATIENT)
Dept: FAMILY MEDICINE | Facility: OTHER | Age: 75
End: 2025-02-24
Attending: FAMILY MEDICINE
Payer: MEDICARE

## 2025-02-24 ENCOUNTER — HOSPITAL ENCOUNTER (EMERGENCY)
Facility: OTHER | Age: 75
Discharge: HOME | End: 2025-02-24
Attending: STUDENT IN AN ORGANIZED HEALTH CARE EDUCATION/TRAINING PROGRAM
Payer: MEDICARE

## 2025-02-24 VITALS
HEIGHT: 70 IN | SYSTOLIC BLOOD PRESSURE: 133 MMHG | DIASTOLIC BLOOD PRESSURE: 83 MMHG | HEART RATE: 73 BPM | WEIGHT: 245 LBS | RESPIRATION RATE: 20 BRPM | TEMPERATURE: 97.8 F | BODY MASS INDEX: 35.07 KG/M2 | OXYGEN SATURATION: 96 %

## 2025-02-24 DIAGNOSIS — R00.0 RACING HEART BEAT: ICD-10-CM

## 2025-02-24 LAB
BASOPHILS # BLD AUTO: 0 10E3/UL (ref 0–0.2)
BASOPHILS NFR BLD AUTO: 0 %
D DIMER PPP FEU-MCNC: 0.42 UG/ML FEU (ref 0–0.5)
EOSINOPHIL # BLD AUTO: 0.3 10E3/UL (ref 0–0.7)
EOSINOPHIL NFR BLD AUTO: 3 %
ERYTHROCYTE [DISTWIDTH] IN BLOOD BY AUTOMATED COUNT: 13.2 % (ref 10–15)
FLUAV RNA SPEC QL NAA+PROBE: NEGATIVE
FLUBV RNA RESP QL NAA+PROBE: NEGATIVE
HCT VFR BLD AUTO: 43.2 % (ref 40–53)
HGB BLD-MCNC: 14.6 G/DL (ref 13.3–17.7)
HOLD SPECIMEN: NORMAL
IMM GRANULOCYTES # BLD: 0 10E3/UL
IMM GRANULOCYTES NFR BLD: 0 %
LYMPHOCYTES # BLD AUTO: 2.5 10E3/UL (ref 0.8–5.3)
LYMPHOCYTES NFR BLD AUTO: 34 %
MAGNESIUM SERPL-MCNC: 2.1 MG/DL (ref 1.7–2.3)
MCH RBC QN AUTO: 30.9 PG (ref 26.5–33)
MCHC RBC AUTO-ENTMCNC: 33.8 G/DL (ref 31.5–36.5)
MCV RBC AUTO: 91 FL (ref 78–100)
MONOCYTES # BLD AUTO: 0.9 10E3/UL (ref 0–1.3)
MONOCYTES NFR BLD AUTO: 12 %
NEUTROPHILS # BLD AUTO: 3.6 10E3/UL (ref 1.6–8.3)
NEUTROPHILS NFR BLD AUTO: 50 %
NRBC # BLD AUTO: 0 10E3/UL
NRBC BLD AUTO-RTO: 0 /100
PLATELET # BLD AUTO: 279 10E3/UL (ref 150–450)
RBC # BLD AUTO: 4.73 10E6/UL (ref 4.4–5.9)
RSV RNA SPEC NAA+PROBE: NEGATIVE
SARS-COV-2 RNA RESP QL NAA+PROBE: NEGATIVE
TROPONIN T SERPL HS-MCNC: 13 NG/L
TSH SERPL DL<=0.005 MIU/L-ACNC: 1.67 UIU/ML (ref 0.3–4.2)
WBC # BLD AUTO: 7.3 10E3/UL (ref 4–11)

## 2025-02-24 PROCEDURE — 93010 ELECTROCARDIOGRAM REPORT: CPT | Performed by: INTERNAL MEDICINE

## 2025-02-24 PROCEDURE — 71045 X-RAY EXAM CHEST 1 VIEW: CPT

## 2025-02-24 PROCEDURE — 36415 COLL VENOUS BLD VENIPUNCTURE: CPT | Performed by: STUDENT IN AN ORGANIZED HEALTH CARE EDUCATION/TRAINING PROGRAM

## 2025-02-24 PROCEDURE — 93242 EXT ECG>48HR<7D RECORDING: CPT

## 2025-02-24 PROCEDURE — 87637 SARSCOV2&INF A&B&RSV AMP PRB: CPT | Performed by: STUDENT IN AN ORGANIZED HEALTH CARE EDUCATION/TRAINING PROGRAM

## 2025-02-24 PROCEDURE — 85379 FIBRIN DEGRADATION QUANT: CPT | Performed by: STUDENT IN AN ORGANIZED HEALTH CARE EDUCATION/TRAINING PROGRAM

## 2025-02-24 PROCEDURE — 99285 EMERGENCY DEPT VISIT HI MDM: CPT | Mod: 25 | Performed by: STUDENT IN AN ORGANIZED HEALTH CARE EDUCATION/TRAINING PROGRAM

## 2025-02-24 PROCEDURE — 99284 EMERGENCY DEPT VISIT MOD MDM: CPT | Performed by: STUDENT IN AN ORGANIZED HEALTH CARE EDUCATION/TRAINING PROGRAM

## 2025-02-24 PROCEDURE — 84443 ASSAY THYROID STIM HORMONE: CPT | Performed by: STUDENT IN AN ORGANIZED HEALTH CARE EDUCATION/TRAINING PROGRAM

## 2025-02-24 PROCEDURE — 93005 ELECTROCARDIOGRAM TRACING: CPT | Mod: XU | Performed by: STUDENT IN AN ORGANIZED HEALTH CARE EDUCATION/TRAINING PROGRAM

## 2025-02-24 PROCEDURE — 83735 ASSAY OF MAGNESIUM: CPT | Performed by: STUDENT IN AN ORGANIZED HEALTH CARE EDUCATION/TRAINING PROGRAM

## 2025-02-24 PROCEDURE — 85014 HEMATOCRIT: CPT | Performed by: STUDENT IN AN ORGANIZED HEALTH CARE EDUCATION/TRAINING PROGRAM

## 2025-02-24 PROCEDURE — 84484 ASSAY OF TROPONIN QUANT: CPT | Performed by: STUDENT IN AN ORGANIZED HEALTH CARE EDUCATION/TRAINING PROGRAM

## 2025-02-24 PROCEDURE — 85004 AUTOMATED DIFF WBC COUNT: CPT | Performed by: STUDENT IN AN ORGANIZED HEALTH CARE EDUCATION/TRAINING PROGRAM

## 2025-02-24 ASSESSMENT — COLUMBIA-SUICIDE SEVERITY RATING SCALE - C-SSRS
1. IN THE PAST MONTH, HAVE YOU WISHED YOU WERE DEAD OR WISHED YOU COULD GO TO SLEEP AND NOT WAKE UP?: NO
2. HAVE YOU ACTUALLY HAD ANY THOUGHTS OF KILLING YOURSELF IN THE PAST MONTH?: NO
6. HAVE YOU EVER DONE ANYTHING, STARTED TO DO ANYTHING, OR PREPARED TO DO ANYTHING TO END YOUR LIFE?: NO

## 2025-02-24 ASSESSMENT — ACTIVITIES OF DAILY LIVING (ADL)
ADLS_ACUITY_SCORE: 41

## 2025-02-25 NOTE — ED TRIAGE NOTES
"Pt presents to ED via private car. At 1730 after eating dinner Pt had an episode where he became short of breath, dizzy, and couldn't clear phlegm from his throat. /81   Pulse 108   Temp 97.8  F (36.6  C) (Tympanic)   Resp 22   Ht 1.778 m (5' 10\")   Wt 111.1 kg (245 lb)   SpO2 96%   BMI 35.15 kg/m         Triage Assessment (Adult)       Row Name 02/24/25 1827          Triage Assessment    Airway WDL WDL        Respiratory WDL    Respiratory WDL X;rhythm/pattern     Rhythm/Pattern, Respiratory shortness of breath;dyspnea upon exertion        Skin Circulation/Temperature WDL    Skin Circulation/Temperature WDL WDL        Cardiac WDL    Cardiac WDL X;rhythm        Peripheral/Neurovascular WDL    Peripheral Neurovascular WDL WDL        Cognitive/Neuro/Behavioral WDL    Cognitive/Neuro/Behavioral WDL WDL                     "

## 2025-02-25 NOTE — PROGRESS NOTES
Jarad Purvis arrived here on 2/25/2025 5:50 AM for 3-7 Days  Zio monitor placement per ordering provider Dr. Neumann for the diagnosis Racing Heartbeat.  Patient s skin was prepped per protocol.  Zio monitor was placed.  Instructions were reviewed with and given to the patient.  Patient verbalized understanding of wear, troubleshooting and monitor return instructions.

## 2025-02-25 NOTE — DISCHARGE INSTRUCTIONS
No serious cause for your symptoms was found this evening.  With having several episodes that seem to resolve and have similar symptoms including lightheadedness, there is concern for possible heart arrhythmia.  Therefore wear Zio patch monitor is instructed for 7 days.  Follow-up with cardiology with referral that was placed for this.    Please review attached instructions including reasons to return to the emergency department.

## 2025-02-25 NOTE — ED PROVIDER NOTES
History     Chief Complaint   Patient presents with    Shortness of Breath    Dizziness       Jarad Purvis is a 74 year old male who presents with dyspnea. Onset acutely upon getting up to wash dishes. He has taken a few home medications since which have helped, including flonase, inhaler, and mucinex. Associated lighheadedness and pins/needles around forehead and that's what prompted presentation. Vitals at home with normal spo2 but heart rate 106. No history of blood clots. No chest or abdominal pain, fevers. He has mild cough with mucous recently, skipped beat sensations. Endorses mild racing heart. He has had several other recent episodes over past weeks. These last several minutes and then seem to resolve on their own. He has some period swelling in his legs, that can be worse on the right.      Allergies   Allergen Reactions    Phenobarbital Hives       Patient Active Problem List    Diagnosis Date Noted    Acute non-recurrent pansinusitis 09/26/2024     Priority: Medium    Morbid obesity (H) 08/29/2022     Priority: Medium    Cerebral palsy (H) 01/25/2018     Priority: Medium     Overview:   right arm flexion contractures      Diverticular disease of colon 01/25/2018     Priority: Medium    Hiatal hernia 01/25/2018     Priority: Medium    Hypertension 01/25/2018     Priority: Medium    Primary thrombocytopenia (H) 01/25/2018     Priority: Medium    Convulsions (H) 01/25/2018     Priority: Medium     Overview:   last seizure age 14  no longer on medications      Sleep apnea 01/25/2018     Priority: Medium    Elevated blood sugar 09/03/2015     Priority: Medium    Hemorrhoids 01/16/2015     Priority: Medium    Incontinence 09/30/2013     Priority: Medium    H/O colonoscopy 10/08/2012     Priority: Medium     Overview:   2008      Corns and callosities 12/12/2011     Priority: Medium    Personal history of other mental disorder 06/03/2010     Priority: Medium       No past medical history on file.    Past  Surgical History:   Procedure Laterality Date    CHOLECYSTECTOMY      No Comments Provided    COLONOSCOPY  2010    ESOPHAGOSCOPY, GASTROSCOPY, DUODENOSCOPY (EGD), COMBINED          EXTRACAPSULAR CATARACT EXTRATION WITH INTRAOCULAR LENS IMPLANT      rt    OTHER SURGICAL HISTORY      79013.1,[place] LAP VENTRAL INCISIONAL HERNIORRAPHY,with mesh    OTHER SURGICAL HISTORY      24791.0,[place] FUSION OF WRIST JOINT    OTHER SURGICAL HISTORY      041215,OTHER    OTHER SURGICAL HISTORY      887053,BONE MARROW ASPIRATION       Family History   Problem Relation Age of Onset    Breast Cancer Mother         Cancer-breast    Heart Disease Father 74        Heart Disease,MI    Hypertension Brother         Hypertension    Arthritis Brother         Arthritis,Rheumatoid    Diabetes No family hx of         Diabetes       Social History     Tobacco Use    Smoking status: Former     Current packs/day: 0.00     Types: Cigarettes     Quit date: 10/8/1985     Years since quittin.4    Smokeless tobacco: Former   Vaping Use    Vaping status: Never Used   Substance Use Topics    Alcohol use: Yes     Alcohol/week: 2.0 standard drinks of alcohol     Types: 2 Standard drinks or equivalent per week     Comment: occasional    Drug use: No       Medications:    albuterol (PROAIR HFA/PROVENTIL HFA/VENTOLIN HFA) 108 (90 Base) MCG/ACT inhaler  amLODIPine (NORVASC) 5 MG tablet  fluticasone (FLONASE) 50 MCG/ACT nasal spray  hydrochlorothiazide (HYDRODIURIL) 25 MG tablet  loratadine (CLARITIN) 10 MG tablet  Multiple Vitamins-Minerals (MULTI-VITAMIN/MINERALS) TABS  multivitamin  with lutein (OCUVITE WITH LUTEIN) CAPS per capsule  order for DME  potassium 99 MG TABS  predniSONE (DELTASONE) 20 MG tablet  Sharps Container (SHARPS-A-GATOR LOCKING BRACKET) MISC  tamsulosin (FLOMAX) 0.4 MG capsule  triamcinolone (ARISTOCORT HP) 0.5 % external cream        Review of Systems: See HPI for pertinent negatives and positives. All other systems reviewed and  "found to be negative.    Physical Exam   /83   Pulse 73   Temp 97.8  F (36.6  C) (Tympanic)   Resp 20   Ht 1.778 m (5' 10\")   Wt 111.1 kg (245 lb)   SpO2 96%   BMI 35.15 kg/m       General: awake, comfortable  HEENT: atraumatic  Respiratory: normal effort, clear to auscultation bilaterally  Cardiovascular: regular rate and rhythm, no murmurs  Abdomen: soft, nondistended, nontender  Extremities: no deformities, edema, or tenderness  Skin: warm, dry, no rashes  Neuro: alert, no focal deficits  Psych: appropriate mood and affect    ED Course      Results for orders placed or performed during the hospital encounter of 02/24/25 (from the past 24 hours)   [place] Draw    Narrative    The following orders were created for panel order [place] Draw.  Procedure                               Abnormality         Status                     ---------                               -----------         ------                     Extra Blue Top Tube[[id number]]                              Final result               Extra Red Top Tube[[id number]]                               Final result               Extra Green Top (Lithium...[[id number]]                      Final result               Extra Purple Top Tube[[id number]]                            Final result               Extra Green Top (Lithium...[[id number]]                      Final result                 Please view results for these tests on the individual orders.   Extra Blue Top Tube   Result Value Ref Range    Hold Specimen JIC    Extra Red Top Tube   Result Value Ref Range    Hold Specimen JIC    Extra Green Top (Lithium Heparin) Tube   Result Value Ref Range    Hold Specimen JIC    Extra Purple Top Tube   Result Value Ref Range    Hold Specimen JIC    Extra Green Top (Lithium Heparin) ON ICE   Result Value Ref Range    Hold Specimen JIC    CBC with platelets differential    Narrative    The following orders were created for panel order CBC with platelets " differential.  Procedure                               Abnormality         Status                     ---------                               -----------         ------                     CBC with platelets and d...[[id number]]                      Final result                 Please view results for these tests on the individual orders.   D dimer quantitative   Result Value Ref Range    D-Dimer Quantitative 0.42 0.00 - 0.50 ug/mL FEU    Narrative    This D-dimer assay is intended for use in conjunction with a clinical pretest probability assessment model to exclude pulmonary embolism (PE) and deep venous thrombosis (DVT) in outpatients suspected of PE or DVT. The cut-off value is 0.50 ug/mL FEU.    For patients 50 years of age or older, the application of age-adjusted cut-off values for D-Dimer may increase the specificity without significant effect on sensitivity. The literature suggested calculation age adjusted cut-off in ug/L = age in years x 10 ug/L. The results in this laboratory are reported as ug/mL rather than ug/L. The calculation for age adjusted cut off in ug/mL= age in years x 0.01 ug/mL. For example, the cut off for a 76 year old male is 76 x 0.01 ug/mL = 0.76 ug/mL (760 ug/L).    M Grace et al. Age adjusted D-dimer cut-off levels to rule out pulmonary embolism: The ADJUST-PE Study. JASMINE 2014;311:8215-6714.; HJ Elly et al. Diagnostic accuracy of conventional or age adjusted D-dimer cutoff values in older patients with suspected venous thromboembolism. Systemic review and meta-analysis. BMJ 2013:346:f2492.   Troponin T, High Sensitivity   Result Value Ref Range    Troponin T, High Sensitivity 13 <=22 ng/L   Magnesium   Result Value Ref Range    Magnesium 2.1 1.7 - 2.3 mg/dL   TSH Reflex GH   Result Value Ref Range    TSH 1.67 0.30 - 4.20 uIU/mL   CBC with platelets and differential   Result Value Ref Range    WBC Count 7.3 4.0 - 11.0 10e3/uL    RBC Count 4.73 4.40 - 5.90 10e6/uL    Hemoglobin  14.6 13.3 - 17.7 g/dL    Hematocrit 43.2 40.0 - 53.0 %    MCV 91 78 - 100 fL    MCH 30.9 26.5 - 33.0 pg    MCHC 33.8 31.5 - 36.5 g/dL    RDW 13.2 10.0 - 15.0 %    Platelet Count 279 150 - 450 10e3/uL    % Neutrophils 50 %    % Lymphocytes 34 %    % Monocytes 12 %    % Eosinophils 3 %    % Basophils 0 %    % Immature Granulocytes 0 %    NRBCs per 100 WBC 0 <1 /100    Absolute Neutrophils 3.6 1.6 - 8.3 10e3/uL    Absolute Lymphocytes 2.5 0.8 - 5.3 10e3/uL    Absolute Monocytes 0.9 0.0 - 1.3 10e3/uL    Absolute Eosinophils 0.3 0.0 - 0.7 10e3/uL    Absolute Basophils 0.0 0.0 - 0.2 10e3/uL    Absolute Immature Granulocytes 0.0 <=0.4 10e3/uL    Absolute NRBCs 0.0 10e3/uL   Influenza A/B, RSV and SARS-CoV2 PCR (COVID-19) Nose    Specimen: Nose; Swab   Result Value Ref Range    Influenza A PCR Negative Negative    Influenza B PCR Negative Negative    RSV PCR Negative Negative    SARS CoV2 PCR Negative Negative    Narrative    Testing was performed using the Xpert Xpress CoV2/Flu/RSV Assay on the Cepheid GeneXpert Instrument. This test should be ordered for the detection of SARS-CoV2, influenza, and RSV viruses in individuals with signs and symptoms of respiratory tract infection. This test is for in vitro diagnostic use under the [place] FDA for laboratories certified under CLIA to perform high or moderate complexity testing. This test has been [place] FDA cleared. A negative result does not rule out the presence of PCR inhibitors in the specimen or target RNA in concentration below the limit of detection for the assay. If only one viral target is positive but coinfection with multiple targets is suspected, the sample should be re-tested with another FDA cleared, approved, or authorized test, if coninfection would change clinical management. This test was validated by the [facility]. These laboratories are certified under the Clinical Laboratory Improvement Amendments of 1988 (CLIA-88) as qualified to perfom high  complexity laboratory testing.   XR Chest Port 1 View    Narrative    EXAM: CHEST SINGLE VIEW PORTABLE  LOCATION: [facility]  DATE: 2/24/2025    INDICATION: Shortness of breath.  COMPARISON: 5/3/2024.    FINDINGS: Hypoinflated lungs. No convincing pulmonary opacities. Unchanged cardiac silhouette. Tortuous or ectatic thoracic aorta.      Impression    IMPRESSION: No convincing evidence of active cardiopulmonary disease.        Medications - No data to display    Assessments & Plan (with Medical Decision Making)     I have reviewed the nursing notes.    ED Course as of 02/25/25 0136   Mon Feb 24, 2025 2133 . 2134 EKG: Sinus rhythm, first degree block , no evidence of acute ischemia with no ST abnormalities, LBBB, I/II/V4-6 TWI, hyperacute T waves. No prior comparision.   2218 Negative orthostatics.        74 year old male evaluated for recurrent acute dyspneic episodes associated with lightheadedness and racing heart last several minues. Evaluation unremarkable. Suspect potential arrhythmia. Discharging home with Ziopatch and cardiology referral amd below plan and attached instructions on diagnosis provided including ED return precautions.     I have reviewed the findings, diagnosis, plan, and need for any follow up with the patient.    Patient instructions:   No serious cause for your symptoms was found this evening.  With having several episodes that seem to resolve and have similar symptoms including lightheadedness, there is concern for possible heart arrhythmia.  Therefore wear Zio patch monitor is instructed for 7 days.  Follow-up with cardiology with referral that was placed for this.    Please review attached instructions including reasons to return to the emergency department.     Discharge Medication List as of 2/24/2025 11:09 PM          Final diagnoses:   Racing heart beat       2/24/2025   [facility]     Malcom Neumann MD  02/25/25 0136

## 2025-02-26 LAB
ATRIAL RATE - MUSE: 80 BPM
DIASTOLIC BLOOD PRESSURE - MUSE: NORMAL MMHG
INTERPRETATION ECG - MUSE: NORMAL
P AXIS - MUSE: 46 DEGREES
PR INTERVAL - MUSE: 242 MS
QRS DURATION - MUSE: 92 MS
QT - MUSE: 370 MS
QTC - MUSE: 426 MS
R AXIS - MUSE: 14 DEGREES
SYSTOLIC BLOOD PRESSURE - MUSE: NORMAL MMHG
T AXIS - MUSE: 23 DEGREES
VENTRICULAR RATE- MUSE: 80 BPM

## 2025-03-04 ENCOUNTER — HOSPITAL ENCOUNTER (OUTPATIENT)
Dept: GENERAL RADIOLOGY | Facility: OTHER | Age: 75
Discharge: HOME | End: 2025-03-04
Attending: FAMILY MEDICINE
Payer: MEDICARE

## 2025-03-04 ENCOUNTER — OFFICE VISIT (OUTPATIENT)
Dept: FAMILY MEDICINE | Facility: OTHER | Age: 75
End: 2025-03-04
Attending: FAMILY MEDICINE
Payer: MEDICARE

## 2025-03-04 VITALS
TEMPERATURE: 97.9 F | BODY MASS INDEX: 34.75 KG/M2 | DIASTOLIC BLOOD PRESSURE: 68 MMHG | RESPIRATION RATE: 20 BRPM | OXYGEN SATURATION: 94 % | SYSTOLIC BLOOD PRESSURE: 108 MMHG | WEIGHT: 242.2 LBS | HEART RATE: 86 BPM

## 2025-03-04 DIAGNOSIS — G80.9 CEREBRAL PALSY, UNSPECIFIED TYPE (H): ICD-10-CM

## 2025-03-04 DIAGNOSIS — M19.012 PRIMARY OSTEOARTHRITIS OF LEFT SHOULDER: ICD-10-CM

## 2025-03-04 DIAGNOSIS — E66.01 MORBID OBESITY (H): ICD-10-CM

## 2025-03-04 DIAGNOSIS — M75.42 ROTATOR CUFF IMPINGEMENT SYNDROME OF LEFT SHOULDER: ICD-10-CM

## 2025-03-04 DIAGNOSIS — M75.42 ROTATOR CUFF IMPINGEMENT SYNDROME OF LEFT SHOULDER: Primary | ICD-10-CM

## 2025-03-04 DIAGNOSIS — R56.9 CONVULSIONS, UNSPECIFIED CONVULSION TYPE (H): ICD-10-CM

## 2025-03-04 PROBLEM — D69.49 PRIMARY THROMBOCYTOPENIA (H): Status: RESOLVED | Noted: 2018-01-25 | Resolved: 2025-03-04

## 2025-03-04 PROCEDURE — G0463 HOSPITAL OUTPT CLINIC VISIT: HCPCS

## 2025-03-04 PROCEDURE — 73030 X-RAY EXAM OF SHOULDER: CPT | Mod: LT

## 2025-03-04 ASSESSMENT — PAIN SCALES - GENERAL: PAINLEVEL_OUTOF10: NO PAIN (0)

## 2025-03-04 NOTE — NURSING NOTE
Patient here for ED follow up racing heart and painful left shoulder. Medication Reconciliation: complete.    Devi Vivar LPN  3/4/2025 10:17 AM

## 2025-03-04 NOTE — PROGRESS NOTES
SUBJECTIVE:   Jarad Purvis is a 74 year old male who presents to clinic today for the following health issues:    Pain    History of Present Illness       Reason for visit:  Painful left arm    He eats 2-3 servings of fruits and vegetables daily.He consumes 0 sweetened beverage(s) daily.He exercises with enough effort to increase his heart rate 10 to 19 minutes per day.  He exercises with enough effort to increase his heart rate 3 or less days per week.   He is taking medications regularly.    Patient arrives here for left shoulder pain.  He has been taking ibuprofen on a regular basis chiropractic care but reports no relief.  States has been going on years.  He will use heat to improve the pain.  Typically if he lays on it the pain is worse.    Patient Active Problem List    Diagnosis Date Noted    Acute non-recurrent pansinusitis 09/26/2024     Priority: Medium    Morbid obesity (H) 08/29/2022     Priority: Medium    Cerebral palsy (H) 01/25/2018     Priority: Medium     Overview:   right arm flexion contractures      Diverticular disease of colon 01/25/2018     Priority: Medium    Hiatal hernia 01/25/2018     Priority: Medium    Hypertension 01/25/2018     Priority: Medium    Convulsions (H) 01/25/2018     Priority: Medium     Overview:   last seizure age 14  no longer on medications      Sleep apnea 01/25/2018     Priority: Medium    Elevated blood sugar 09/03/2015     Priority: Medium    Hemorrhoids 01/16/2015     Priority: Medium    Incontinence 09/30/2013     Priority: Medium    H/O colonoscopy 10/08/2012     Priority: Medium     Overview:   2008      Corns and callosities 12/12/2011     Priority: Medium    Personal history of other mental disorder 06/03/2010     Priority: Medium     No past medical history on file.   Past Surgical History:   Procedure Laterality Date    CHOLECYSTECTOMY      No Comments Provided    COLONOSCOPY  06/01/2010    ESOPHAGOSCOPY, GASTROSCOPY, DUODENOSCOPY (EGD), COMBINED       1998    EXTRACAPSULAR CATARACT EXTRATION WITH INTRAOCULAR LENS IMPLANT      rt    OTHER SURGICAL HISTORY      03847.1,[place] LAP VENTRAL INCISIONAL HERNIORRAPHY,with mesh    OTHER SURGICAL HISTORY      18850.0,[place] FUSION OF WRIST JOINT    OTHER SURGICAL HISTORY      017415,OTHER    OTHER SURGICAL HISTORY      665178,BONE MARROW ASPIRATION       Review of Systems     OBJECTIVE:     /68   Pulse 86   Temp 97.9  F (36.6  C)   Resp 20   Wt 109.9 kg (242 lb 3.2 oz)   SpO2 94%   BMI 34.75 kg/m    Body mass index is 34.75 kg/m .  Physical Exam  Musculoskeletal:      Comments: Contracted right hand.  Left shoulder shows markedly decreased range of motion with internal/external rotation abduction to about 90 degrees only   Neurological:      Mental Status: He is alert.         Diagnostic Test Results:  none     ASSESSMENT/PLAN:         (M75.42) Rotator cuff impingement syndrome of left shoulder  (primary encounter diagnosis)  Comment:   Plan: XR Shoulder Left G/E 3 Views, Orthopedic          Referral        I discussed options with the patient including physical therapy injections.  His x-rays did show advanced degenerative changes.  Recommended orthopedic consult.    (G80.9) Cerebral palsy, unspecified type (H)  Comment: Stable.  Plan:     (R56.9) Convulsions, unspecified convulsion type (H)  Comment: Stable no recent seizures  Plan:     (E66.01) Morbid obesity (H)  Comment:   Plan:     (M19.012) Primary osteoarthritis of left shoulder  Comment:   Plan: Orthopedic  Referral          The longitudinal plan of care for the diagnosis(es)/condition(s) as documented were addressed during this visit. Due to the added complexity in care, I will continue to support Ang in the subsequent management and with ongoing continuity of care.      Neal Enriquez MD  [facility]

## 2025-03-13 LAB — CV ZIO PRELIM RESULTS: NORMAL

## 2025-03-14 ENCOUNTER — MYC MEDICAL ADVICE (OUTPATIENT)
Dept: FAMILY MEDICINE | Facility: OTHER | Age: 75
End: 2025-03-14
Payer: COMMERCIAL

## 2025-03-14 NOTE — TELEPHONE ENCOUNTER
Wondering if he should keep his appt with Cardiology on 4/1 based off of Zio Patch results?    Per comment from Results from 2/24:  Your Zio Patch shows predominantly sinus rhythm with rare supraventricular and ventricular ectopy that appears to be symptomatic.  These are safe rhythms, although I understand you can feel them when it happens.     Recommend keeping appt.     Routing to provider to review and respond.  Bennett Mehta RN on 3/14/2025 at 4:17 PM

## 2025-03-26 ENCOUNTER — APPOINTMENT (OUTPATIENT)
Dept: CT IMAGING | Facility: OTHER | Age: 75
End: 2025-03-26
Attending: FAMILY MEDICINE
Payer: MEDICARE

## 2025-03-26 ENCOUNTER — APPOINTMENT (OUTPATIENT)
Dept: MRI IMAGING | Facility: OTHER | Age: 75
End: 2025-03-26
Attending: STUDENT IN AN ORGANIZED HEALTH CARE EDUCATION/TRAINING PROGRAM
Payer: MEDICARE

## 2025-03-26 ENCOUNTER — HOSPITAL ENCOUNTER (EMERGENCY)
Facility: OTHER | Age: 75
Discharge: HOME | End: 2025-03-26
Attending: FAMILY MEDICINE
Payer: MEDICARE

## 2025-03-26 VITALS
BODY MASS INDEX: 33.18 KG/M2 | HEART RATE: 78 BPM | DIASTOLIC BLOOD PRESSURE: 75 MMHG | HEIGHT: 72 IN | SYSTOLIC BLOOD PRESSURE: 142 MMHG | RESPIRATION RATE: 10 BRPM | WEIGHT: 245 LBS | OXYGEN SATURATION: 98 % | TEMPERATURE: 96.5 F

## 2025-03-26 DIAGNOSIS — G54.2 CERVICAL NERVE ROOT IMPINGEMENT: ICD-10-CM

## 2025-03-26 DIAGNOSIS — R20.0 LEFT ARM NUMBNESS: ICD-10-CM

## 2025-03-26 LAB
ANION GAP SERPL CALCULATED.3IONS-SCNC: 11 MMOL/L (ref 7–15)
APTT PPP: 28 SECONDS (ref 22–38)
BASOPHILS # BLD AUTO: 0 10E3/UL (ref 0–0.2)
BASOPHILS NFR BLD AUTO: 0 %
BUN SERPL-MCNC: 12.9 MG/DL (ref 8–23)
CALCIUM SERPL-MCNC: 9.4 MG/DL (ref 8.8–10.4)
CHLORIDE SERPL-SCNC: 98 MMOL/L (ref 98–107)
CREAT SERPL-MCNC: 0.82 MG/DL (ref 0.67–1.17)
EGFRCR SERPLBLD CKD-EPI 2021: >90 ML/MIN/1.73M2
EOSINOPHIL # BLD AUTO: 0.2 10E3/UL (ref 0–0.7)
EOSINOPHIL NFR BLD AUTO: 3 %
ERYTHROCYTE [DISTWIDTH] IN BLOOD BY AUTOMATED COUNT: 13.2 % (ref 10–15)
GLUCOSE BLDC GLUCOMTR-MCNC: 111 MG/DL (ref 70–99)
GLUCOSE SERPL-MCNC: 110 MG/DL (ref 70–99)
HCO3 SERPL-SCNC: 28 MMOL/L (ref 22–29)
HCT VFR BLD AUTO: 43.8 % (ref 40–53)
HGB BLD-MCNC: 14.9 G/DL (ref 13.3–17.7)
HOLD SPECIMEN: NORMAL
IMM GRANULOCYTES # BLD: 0 10E3/UL
IMM GRANULOCYTES NFR BLD: 0 %
INR PPP: 0.94 (ref 0.85–1.15)
LYMPHOCYTES # BLD AUTO: 1.8 10E3/UL (ref 0.8–5.3)
LYMPHOCYTES NFR BLD AUTO: 24 %
MCH RBC QN AUTO: 30.5 PG (ref 26.5–33)
MCHC RBC AUTO-ENTMCNC: 34 G/DL (ref 31.5–36.5)
MCV RBC AUTO: 90 FL (ref 78–100)
MONOCYTES # BLD AUTO: 0.8 10E3/UL (ref 0–1.3)
MONOCYTES NFR BLD AUTO: 11 %
NEUTROPHILS # BLD AUTO: 4.5 10E3/UL (ref 1.6–8.3)
NEUTROPHILS NFR BLD AUTO: 61 %
NRBC # BLD AUTO: 0 10E3/UL
NRBC BLD AUTO-RTO: 0 /100
PLATELET # BLD AUTO: 263 10E3/UL (ref 150–450)
POTASSIUM SERPL-SCNC: 4.1 MMOL/L (ref 3.4–5.3)
RBC # BLD AUTO: 4.88 10E6/UL (ref 4.4–5.9)
SODIUM SERPL-SCNC: 137 MMOL/L (ref 135–145)
TROPONIN T SERPL HS-MCNC: 13 NG/L
TROPONIN T SERPL HS-MCNC: 14 NG/L
WBC # BLD AUTO: 7.3 10E3/UL (ref 4–11)

## 2025-03-26 PROCEDURE — 93005 ELECTROCARDIOGRAM TRACING: CPT | Performed by: FAMILY MEDICINE

## 2025-03-26 PROCEDURE — 82565 ASSAY OF CREATININE: CPT | Performed by: FAMILY MEDICINE

## 2025-03-26 PROCEDURE — 99285 EMERGENCY DEPT VISIT HI MDM: CPT | Mod: 25 | Performed by: FAMILY MEDICINE

## 2025-03-26 PROCEDURE — 36415 COLL VENOUS BLD VENIPUNCTURE: CPT | Performed by: FAMILY MEDICINE

## 2025-03-26 PROCEDURE — 85025 COMPLETE CBC W/AUTO DIFF WBC: CPT | Performed by: FAMILY MEDICINE

## 2025-03-26 PROCEDURE — 72141 MRI NECK SPINE W/O DYE: CPT

## 2025-03-26 PROCEDURE — 93010 ELECTROCARDIOGRAM REPORT: CPT | Performed by: INTERNAL MEDICINE

## 2025-03-26 PROCEDURE — 0042T CT HEAD PERFUSION W CONTRAST: CPT

## 2025-03-26 PROCEDURE — 80048 BASIC METABOLIC PNL TOTAL CA: CPT | Performed by: FAMILY MEDICINE

## 2025-03-26 PROCEDURE — 250N000011 HC RX IP 250 OP 636: Performed by: FAMILY MEDICINE

## 2025-03-26 PROCEDURE — 99207 PR NO BILLABLE SERVICE THIS VISIT: CPT | Performed by: STUDENT IN AN ORGANIZED HEALTH CARE EDUCATION/TRAINING PROGRAM

## 2025-03-26 PROCEDURE — 82374 ASSAY BLOOD CARBON DIOXIDE: CPT | Performed by: FAMILY MEDICINE

## 2025-03-26 PROCEDURE — 85730 THROMBOPLASTIN TIME PARTIAL: CPT | Performed by: FAMILY MEDICINE

## 2025-03-26 PROCEDURE — 255N000002 HC RX 255 OP 636: Performed by: STUDENT IN AN ORGANIZED HEALTH CARE EDUCATION/TRAINING PROGRAM

## 2025-03-26 PROCEDURE — 84484 ASSAY OF TROPONIN QUANT: CPT | Performed by: FAMILY MEDICINE

## 2025-03-26 PROCEDURE — 70553 MRI BRAIN STEM W/O & W/DYE: CPT

## 2025-03-26 PROCEDURE — 70496 CT ANGIOGRAPHY HEAD: CPT

## 2025-03-26 PROCEDURE — 82962 GLUCOSE BLOOD TEST: CPT

## 2025-03-26 PROCEDURE — 250N000009 HC RX 250: Performed by: FAMILY MEDICINE

## 2025-03-26 PROCEDURE — A9575 INJ GADOTERATE MEGLUMI 0.1ML: HCPCS | Performed by: STUDENT IN AN ORGANIZED HEALTH CARE EDUCATION/TRAINING PROGRAM

## 2025-03-26 PROCEDURE — 250N000013 HC RX MED GY IP 250 OP 250 PS 637: Performed by: STUDENT IN AN ORGANIZED HEALTH CARE EDUCATION/TRAINING PROGRAM

## 2025-03-26 PROCEDURE — 85610 PROTHROMBIN TIME: CPT | Performed by: FAMILY MEDICINE

## 2025-03-26 PROCEDURE — 70450 CT HEAD/BRAIN W/O DYE: CPT

## 2025-03-26 PROCEDURE — 99284 EMERGENCY DEPT VISIT MOD MDM: CPT | Performed by: FAMILY MEDICINE

## 2025-03-26 RX ORDER — IOPAMIDOL 755 MG/ML
115 INJECTION, SOLUTION INTRAVASCULAR ONCE
Status: COMPLETED | OUTPATIENT
Start: 2025-03-26 | End: 2025-03-26

## 2025-03-26 RX ORDER — GADOTERATE MEGLUMINE 376.9 MG/ML
20 INJECTION INTRAVENOUS ONCE
Status: COMPLETED | OUTPATIENT
Start: 2025-03-26 | End: 2025-03-26

## 2025-03-26 RX ADMIN — IBUPROFEN 600 MG: 200 TABLET, FILM COATED ORAL at 09:16

## 2025-03-26 RX ADMIN — SODIUM CHLORIDE 130 ML: 9 INJECTION, SOLUTION INTRAVENOUS at 06:39

## 2025-03-26 RX ADMIN — IOPAMIDOL 115 ML: 755 INJECTION, SOLUTION INTRAVENOUS at 06:38

## 2025-03-26 RX ADMIN — GADOTERATE MEGLUMINE 20 ML: 376.9 INJECTION INTRAVENOUS at 14:01

## 2025-03-26 ASSESSMENT — ACTIVITIES OF DAILY LIVING (ADL)
ADLS_ACUITY_SCORE: 41

## 2025-03-26 ASSESSMENT — ENCOUNTER SYMPTOMS
SPEECH DIFFICULTY: 0
SHORTNESS OF BREATH: 0
ABDOMINAL PAIN: 0
FEVER: 0
NUMBNESS: 1
DIZZINESS: 0
WEAKNESS: 1
COUGH: 0
HEADACHES: 0
FACIAL ASYMMETRY: 0
TREMORS: 0

## 2025-03-26 NOTE — ED PROVIDER NOTES
"  History     Chief Complaint   Patient presents with    Numbness     HPI  Jarad Purvis is a 74 year old male who is brought in by wife for concerns related to tingling and numbness of the left arm and bilateral lower legs.  Onset of symptoms was at 3:00 this morning when he woke up from sleep.  Last known well was before bed last night.  No prior history of stroke.  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  By the time he arrived here his legs felt back to normal but he had some persistent tingling and numbness in the left arm and left chest wall.  He notes that his neck is \"popping\" and that perhaps this is related to his symptoms.  No fevers or chills.  No nausea or vomiting.  NIHSS score on arrival to the ER is 0.    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    Past Surgical " History:    Past Surgical History:   Procedure Laterality Date    CHOLECYSTECTOMY      No Comments Provided    COLONOSCOPY  2010    ESOPHAGOSCOPY, GASTROSCOPY, DUODENOSCOPY (EGD), COMBINED          EXTRACAPSULAR CATARACT EXTRATION WITH INTRAOCULAR LENS IMPLANT      rt    OTHER SURGICAL HISTORY      56977.1,ND LAP VENTRAL INCISIONAL HERNIORRAPHY,with mesh    OTHER SURGICAL HISTORY      32701.0,ND FUSION OF WRIST JOINT    OTHER SURGICAL HISTORY      380939,OTHER    OTHER SURGICAL HISTORY      [id number],BONE MARROW ASPIRATION       Family History:    Family History   Problem Relation Age of Onset    Breast Cancer Mother         Cancer-breast    Heart Disease Father 74        Heart Disease,MI    Hypertension Brother         Hypertension    Arthritis Brother         Arthritis,Rheumatoid    Diabetes No family hx of         Diabetes       Social History:  Marital Status:   [2]  Social History     Tobacco Use    Smoking status: Former     Current packs/day: 0.00     Types: Cigarettes     Quit date: 10/8/1985     Years since quittin.4    Smokeless tobacco: Former   Vaping Use    Vaping status: Never Used   Substance Use Topics    Alcohol use: Yes     Alcohol/week: 2.0 standard drinks of alcohol     Types: 2 Standard drinks or equivalent per week     Comment: occasional    Drug use: No        Medications:    albuterol (PROAIR HFA/PROVENTIL HFA/VENTOLIN HFA) 108 (90 Base) MCG/ACT inhaler  amLODIPine (NORVASC) 5 MG tablet  fluticasone (FLONASE) 50 MCG/ACT nasal spray  hydrochlorothiazide (HYDRODIURIL) 25 MG tablet  Multiple Vitamins-Minerals (MULTI-VITAMIN/MINERALS) TABS  multivitamin  with lutein (OCUVITE WITH LUTEIN) CAPS per capsule  order for DME  potassium 99 MG TABS  Sharps Container (SHARPS-A-GATOR LOCKING BRACKET) MISC  tamsulosin (FLOMAX) 0.4 MG capsule  triamcinolone (ARISTOCORT HP) 0.5 % external cream          Review of Systems   Constitutional:  Negative for fever.   Respiratory:  Negative for  cough and shortness of breath.    Cardiovascular:  Negative for chest pain.   Gastrointestinal:  Negative for abdominal pain.   Skin:  Negative for rash.   Neurological:  Positive for weakness and numbness. Negative for dizziness, tremors, syncope, facial asymmetry, speech difficulty and headaches.   All other systems reviewed and are negative.      Physical Exam   BP: (!) 145/90  Pulse: 82  Temp: (!) 96.5  F (35.8  C)  Resp: 20  Height: 182.9 cm (6')  Weight: 111.1 kg (245 lb)  SpO2: 98 %      Physical Exam  Vitals and nursing note reviewed.   Constitutional:       General: He is not in acute distress.     Appearance: Normal appearance. He is not toxic-appearing.   HENT:      Head: Atraumatic.      Nose: Nose normal.      Mouth/Throat:      Mouth: Mucous membranes are moist.   Eyes:      General: No scleral icterus.     Conjunctiva/sclera: Conjunctivae normal.      Pupils: Pupils are equal, round, and reactive to light.   Cardiovascular:      Rate and Rhythm: Normal rate and regular rhythm.      Heart sounds: Normal heart sounds. No murmur heard.  Pulmonary:      Effort: Pulmonary effort is normal. No respiratory distress.      Breath sounds: Normal breath sounds.   Abdominal:      General: Abdomen is flat.      Palpations: Abdomen is soft.      Tenderness: There is no abdominal tenderness.   Musculoskeletal:         General: Deformity present. No swelling or tenderness.      Cervical back: Neck supple.      Right lower leg: Edema present.      Left lower leg: Edema present.      Comments: Contraction deformity of right arm present since birth, no acute change   Skin:     General: Skin is warm.   Neurological:      General: No focal deficit present.      Mental Status: He is alert and oriented to person, place, and time.      Cranial Nerves: No cranial nerve deficit.      Motor: No weakness.   Psychiatric:         Mood and Affect: Mood normal.         Behavior: Behavior normal.         ED Course     ED Course as of  "03/26/25 0703   Wed Mar 26, 2025   0630 LKW before bed. Woke up at 3am with numbness of L arm and B/L LE. LE normal now, L arm still \"numb\".   0633 Tier 2 stroke code. Will de escalate to non stroke code work up based on no symptoms at this time, LKW before bed   0649 Call from radiology: neg for hemorrhage. Area of encephalomalacia   0703 CTA negative. Stroke requests de-escalation and MRI.     Procedures              EKG Interpretation:      Interpreted by Carrie Benitez DO  Time reviewed:   Symptoms at time of EKG: L arm numbness   Rhythm: normal sinus   Rate: normal  Axis: normal  Ectopy: none  Conduction: 1st degree AVB  ST Segments/ T Waves: No ST-T wave changes  Q Waves: none  Comparison to prior: Unchanged    Clinical Impression: normal EKG      Critical Care time:  none     The patient has stroke symptoms:         ED Stroke specific documentation           NIHSS PDF     Patient last known well time: before bed  ED Provider first to bedside at: 0620  CT Results received at: 0650    Thrombolytics:   Not given due to:   - unclear or unfavorable risk-benefit profile for extended window thrombolysis beyond the conventional 4.5 hour time window    If treating with thrombolytics: Ensure SBP<180 and DBP<105 prior to treatment with thrombolytics.  Administering thrombolytics after treatment with IV labetalol, hydralazine, or nicardipine is reasonable once BP control is established.    Endovascular Retrieval:  Not initiated due to absence of proximal vessel occlusion    National [employer] Stroke Scale (Baseline)  Time Performed:      Score    Level of consciousness: (0)   Alert, keenly responsive    LOC questions: (0)   Answers both questions correctly    LOC commands: (0)   Performs both tasks correctly    Best gaze: (0)   Normal    Visual: (0)   No visual loss    Facial palsy: (0)   Normal symmetrical movements    Motor arm (left): (0)   No drift    Motor arm (right): (0)   contraction deformity from " birth    Motor leg (left): (0)   No drift    Motor leg (right): (0)   No drift    Limb ataxia: (0)   Absent    Sensory: (0)   Normal- no sensory loss    Best language: (0)   Normal- no aphasia    Dysarthria: (0)   Normal    Extinction and inattention: (0)   No abnormality        Total Score:  0        Stroke Mimics were considered (including migraine headache, seizure disorder, hypoglycemia (or hyperglycemia), head or spinal trauma, CNS infection, Toxin ingestion and shock state (e.g. sepsis) .               Results for orders placed or performed during the hospital encounter of 03/26/25 (from the past 24 hours)   CBC with Platelets & Differential    Narrative    The following orders were created for panel order CBC with Platelets & Differential.  Procedure                               Abnormality         Status                     ---------                               -----------         ------                     CBC with platelets and ...[[id number]]                      Final result                 Please view results for these tests on the individual orders.   Basic metabolic panel   Result Value Ref Range    Sodium 137 135 - 145 mmol/L    Potassium 4.1 3.4 - 5.3 mmol/L    Chloride 98 98 - 107 mmol/L    Carbon Dioxide (CO2) 28 22 - 29 mmol/L    Anion Gap 11 7 - 15 mmol/L    Urea Nitrogen 12.9 8.0 - 23.0 mg/dL    Creatinine 0.82 0.67 - 1.17 mg/dL    GFR Estimate >90 >60 mL/min/1.73m2    Calcium 9.4 8.8 - 10.4 mg/dL    Glucose 110 (H) 70 - 99 mg/dL   INR   Result Value Ref Range    INR 0.94 0.85 - 1.15   Partial thromboplastin time   Result Value Ref Range    aPTT 28 22 - 38 Seconds   Troponin T, High Sensitivity   Result Value Ref Range    Troponin T, High Sensitivity 14 <=22 ng/L   Extra Tube    Narrative    The following orders were created for panel order Extra Tube.  Procedure                               Abnormality         Status                     ---------                               -----------          ------                     Extra Blue Top Tube[[id number]]                             In process                 Extra Red Top Tube[[id number]]                              In process                 Extra Green Top (Lithiu...[[id number]]                      In process                 Extra Purple Top Tube[[id number]]                           In process                 Extra Green Top (Lithiu...[[id number]]                      In process                   Please view results for these tests on the individual orders.   CBC with platelets and differential   Result Value Ref Range    WBC Count 7.3 4.0 - 11.0 10e3/uL    RBC Count 4.88 4.40 - 5.90 10e6/uL    Hemoglobin 14.9 13.3 - 17.7 g/dL    Hematocrit 43.8 40.0 - 53.0 %    MCV 90 78 - 100 fL    MCH 30.5 26.5 - 33.0 pg    MCHC 34.0 31.5 - 36.5 g/dL    RDW 13.2 10.0 - 15.0 %    Platelet Count 263 150 - 450 10e3/uL    % Neutrophils 61 %    % Lymphocytes 24 %    % Monocytes 11 %    % Eosinophils 3 %    % Basophils 0 %    % Immature Granulocytes 0 %    NRBCs per 100 WBC 0 <1 /100    Absolute Neutrophils 4.5 1.6 - 8.3 10e3/uL    Absolute Lymphocytes 1.8 0.8 - 5.3 10e3/uL    Absolute Monocytes 0.8 0.0 - 1.3 10e3/uL    Absolute Eosinophils 0.2 0.0 - 0.7 10e3/uL    Absolute Basophils 0.0 0.0 - 0.2 10e3/uL    Absolute Immature Granulocytes 0.0 <=0.4 10e3/uL    Absolute NRBCs 0.0 10e3/uL   Glucose by meter   Result Value Ref Range    GLUCOSE BY METER POCT 111 (H) 70 - 99 mg/dL       Medications   iopamidol (ISOVUE-370) solution 115 mL (115 mLs Intravenous $Given 3/26/25 0639)     And   sodium chloride 0.9 % bag for CT scan flush use (130 mLs As instructed $Given 3/26/25 0639)       Assessments & Plan (with Medical Decision Making)     I have reviewed the nursing notes.    I have reviewed the findings, diagnosis, plan and need for follow up with the patient.           Medical Decision Making  The patient's presentation was of moderate complexity (an undiagnosed new  problem with uncertain prognosis).    The patient's evaluation involved:  ordering and/or review of 3+ test(s) in this encounter (see separate area of note for details)    The patient's management necessitated moderate risk (prescription drug management including medications given in the ED).        New Prescriptions    No medications on file       Final diagnoses:   Left arm numbness   Further work up pending. Cannot exclude TIA/stroke.     Sign out at shift change to Dr. Neumann    3/26/2025   [facility]       Carrie Benitez DO  03/26/25 0704

## 2025-03-26 NOTE — CONSULTS
[facility]    Stroke Telephone Note    I was called by Carrie Benitez on 03/26/25 regarding patient Jarad Purvis. The patient is a 74 year old man with HTN, cerebral palsy (with right arm flexion contracture), obesity and sleep apnea, who was apparently feeling well when he went to bed yesterday night. He awoke this morning at 3 AM with numbness and tingling in his left arm associated with left arm weakness. He also reported feeling transiently weak in his legs. On arrival at the ED, NIHSS of 0 with patient reporting near resolution of all symptoms except for some residual left arm tingling.     Vitals  BP: (!) 145/90   Pulse: 82   Resp: 20   Temp: (!) 96.5  F (35.8  C)   Weight: 111.1 kg (245 lb)    Stroke Code Data (for stroke code without tele)  Stroke code activated 03/26/25  0621   Stroke provider first response 03/26/25  0624   Last known normal 03/25/25   (went to bed late night; time not known)  Unknown   Time of discovery (or onset of symptoms) 03/26/25  0300   Head CT read by Stroke Neuro Provider 03/26/25  0637   Was stroke code de-escalated? Yes  03/26/25  0655     Imaging Findings  CT head: No acute ischemia/hemorrhage  CTA head/neck: No LVO/critical stenoses.     Intravenous Thrombolysis  Not given due to:   - minor/isolated/quickly resolving symptoms    Endovascular Treatment  Not initiated due to absence of proximal vessel occlusion    Impression  Left Upper extremity numbness and weakness - now resolved  B/l lower extremity weakness - resolved    Recommendations   - MRI Brain and MRI C spine w/wo contrast    My recommendations are based on the information provided over the phone by Jarad Purvis's in-person providers. They are not intended to replace the clinical judgment of his in-person providers. I was not requested to personally see or examine the patient at this time.     Pan Stover MD  Vascular Neurology    To page me or covering stroke neurology team member,  "click here: AMCOM  Choose \"On Call\" tab at top, then select \"NEUROLOGY/ALL SITES\" from middle drop-down box, press Enter, then look for \"stroke\" or \"telestroke\" for your site.   "

## 2025-03-26 NOTE — ED TRIAGE NOTES
"Pt woke up around 0300 with numbness on the left side and weakness in both legs.  His neck was \"popping\" on the left side.  He has sensation on both sides.  Numbness is starting to fade. He also experienced weakness on the left arm and both legs.       Triage Assessment (Adult)       Row Name 03/26/25 0613          Triage Assessment    Airway WDL WDL        Respiratory WDL    Respiratory WDL WDL        Skin Circulation/Temperature WDL    Skin Circulation/Temperature WDL WDL        Cardiac WDL    Cardiac WDL WDL        Peripheral/Neurovascular WDL    Peripheral Neurovascular WDL WDL        Cognitive/Neuro/Behavioral WDL    Cognitive/Neuro/Behavioral WDL WDL                     "

## 2025-03-26 NOTE — PROGRESS NOTES
"  [facility]    Stroke Telephone Note    I was called by Malcom Neumann on 03/26/25 regarding patient Jarad Purvis. The patient is a 74 year old male who presented with L arm paraesthesias and bilateral leg weakness. MRI brain negative for acute stroke. MR cervical spine as below. Patient improved per ED provider, reports that he sometimes sleeps on left side and has occasional paresthesias.    Vitals  BP: (!) 142/75   Pulse: 78   Resp: 10   Temp: (!) 96.5  F (35.8  C)   Weight: 111.1 kg (245 lb)    Imaging Findings  MRI: negative for acute stroke  MR cervical spine:   Multilevel cervical spondylosis with moderate to severe spinal canal  narrowing at C3-C6. Heterogeneous cord signal at C3-C6 could represent  small areas of myomalacia, which could be posttraumatic change from  prior injury.     Multilevel nerve root impingement, including impingement of the  bilateral C4, C5, C6 and impingement of the exiting right C7 nerve.    Recommendations   No further stroke evaluation indicated at this time. Follow up with outpatient general neurology and neurosurgery.    Case discussed with vascular neurology attending Dr. Navarro.    My recommendations are based on the information provided over the phone by Jarad Purvis's in-person providers. They are not intended to replace the clinical judgment of his in-person providers. I was not requested to personally see or examine the patient at this time.     Selam Ramsey, CNP  Vascular Neurology    To page me or covering stroke neurology team member, click here: AMCOM  Choose \"On Call\" tab at top, then select \"NEUROLOGY/ALL SITES\" from middle drop-down box, press Enter, then look for \"stroke\" or \"telestroke\" for your site.    "

## 2025-03-26 NOTE — ED PROVIDER NOTES
"Transfer of care from previous shift provider:. Left sided arm numbness and bilateral lower extremity numbness and weakness. Leg symptoms resolved. Negative CT w/o. CTA pending. Hx includes CP with right arm contractions. Last known well at bedtime.     ED Course as of 03/26/25 0705   Wed Mar 26, 2025   0630 LKW before bed. Woke up at 3am with numbness of L arm and B/L LE. LE normal now, L arm still \"numb\".   0633 Tier 2 stroke code. Will de escalate to non stroke code work up based on no symptoms at this time, LKW before bed   0649 Call from radiology: neg for hemorrhage. Area of encephalomalacia   0703 CTA and perfusion studies negative. Stroke requests de-escalation and MRI.     Assessment and Plan:  Final diagnoses:   Left arm numbness      LUE numbness resolved. Patient talks about periodic symptoms like this in past that he attributes to sleeping funny and possibly with arm impingment by CPAP apparatus. MRI images reviewed with stroke neuro per below who feels that discharge is appropriate at this time with no need for admission. Disposition: home with attached instructions on diagnosis provided including ED return precautions.      Patient instructions:   Stroke neurology reviewed your MRI findings and has low suspicion your symptoms were due to stroke/TIA. You have some impingement on both sides of your C4-C6 nerve roots, and right C7 nerve root, and this could cause numbness and tingling in arms. Neurology referral was placed to get follow up.     Please review attached instructions including reasons to return to the emergency department, including concerning change to your recent symptoms.      Malcom Neumann MD  03/26/25 1529    "

## 2025-03-26 NOTE — DISCHARGE INSTRUCTIONS
Stroke neurology reviewed your MRI findings and has low suspicion your symptoms were due to stroke/TIA. You have some impingement on both sides of your C4-C6 nerve roots, and right C7 nerve root, and this could cause numbness and tingling in arms. Neurology referral was placed to get follow up.     Please review attached instructions including reasons to return to the emergency department, including concerning change to your recent symptoms.

## 2025-03-27 LAB
ATRIAL RATE - MUSE: 68 BPM
DIASTOLIC BLOOD PRESSURE - MUSE: NORMAL MMHG
INTERPRETATION ECG - MUSE: NORMAL
P AXIS - MUSE: 46 DEGREES
PR INTERVAL - MUSE: 246 MS
QRS DURATION - MUSE: 92 MS
QT - MUSE: 382 MS
QTC - MUSE: 406 MS
R AXIS - MUSE: 37 DEGREES
SYSTOLIC BLOOD PRESSURE - MUSE: NORMAL MMHG
T AXIS - MUSE: 50 DEGREES
VENTRICULAR RATE- MUSE: 68 BPM

## 2025-03-31 ENCOUNTER — OFFICE VISIT (OUTPATIENT)
Dept: ORTHOPEDICS | Facility: OTHER | Age: 75
End: 2025-03-31
Attending: FAMILY MEDICINE
Payer: COMMERCIAL

## 2025-03-31 VITALS
BODY MASS INDEX: 33.36 KG/M2 | SYSTOLIC BLOOD PRESSURE: 134 MMHG | OXYGEN SATURATION: 95 % | WEIGHT: 246 LBS | DIASTOLIC BLOOD PRESSURE: 86 MMHG | HEART RATE: 98 BPM

## 2025-03-31 DIAGNOSIS — M75.42 ROTATOR CUFF IMPINGEMENT SYNDROME OF LEFT SHOULDER: ICD-10-CM

## 2025-03-31 DIAGNOSIS — M19.012 PRIMARY OSTEOARTHRITIS OF LEFT SHOULDER: Primary | ICD-10-CM

## 2025-03-31 PROCEDURE — 20610 DRAIN/INJ JOINT/BURSA W/O US: CPT | Mod: LT | Performed by: ORTHOPAEDIC SURGERY

## 2025-03-31 PROCEDURE — 250N000009 HC RX 250: Mod: JZ | Performed by: ORTHOPAEDIC SURGERY

## 2025-03-31 PROCEDURE — 250N000011 HC RX IP 250 OP 636: Mod: JZ | Performed by: ORTHOPAEDIC SURGERY

## 2025-03-31 PROCEDURE — 99203 OFFICE O/P NEW LOW 30 MIN: CPT | Mod: 25 | Performed by: ORTHOPAEDIC SURGERY

## 2025-03-31 PROCEDURE — 3079F DIAST BP 80-89 MM HG: CPT | Performed by: ORTHOPAEDIC SURGERY

## 2025-03-31 PROCEDURE — 1125F AMNT PAIN NOTED PAIN PRSNT: CPT | Performed by: ORTHOPAEDIC SURGERY

## 2025-03-31 PROCEDURE — G0463 HOSPITAL OUTPT CLINIC VISIT: HCPCS | Mod: 25

## 2025-03-31 PROCEDURE — G0463 HOSPITAL OUTPT CLINIC VISIT: HCPCS

## 2025-03-31 PROCEDURE — 3075F SYST BP GE 130 - 139MM HG: CPT | Performed by: ORTHOPAEDIC SURGERY

## 2025-03-31 RX ORDER — LIDOCAINE HYDROCHLORIDE 10 MG/ML
2 INJECTION, SOLUTION EPIDURAL; INFILTRATION; INTRACAUDAL; PERINEURAL ONCE
Status: COMPLETED | OUTPATIENT
Start: 2025-03-31 | End: 2025-03-31

## 2025-03-31 RX ORDER — TRIAMCINOLONE ACETONIDE 40 MG/ML
40 INJECTION, SUSPENSION INTRA-ARTICULAR; INTRAMUSCULAR ONCE
Status: COMPLETED | OUTPATIENT
Start: 2025-03-31 | End: 2025-03-31

## 2025-03-31 RX ADMIN — TRIAMCINOLONE ACETONIDE 40 MG: 40 INJECTION, SUSPENSION INTRA-ARTICULAR; INTRAMUSCULAR at 11:25

## 2025-03-31 RX ADMIN — LIDOCAINE HYDROCHLORIDE 2 ML: 10 INJECTION, SOLUTION EPIDURAL; INFILTRATION; INTRACAUDAL; PERINEURAL at 11:25

## 2025-03-31 RX ADMIN — LIDOCAINE HYDROCHLORIDE 2 ML: 10 INJECTION, SOLUTION EPIDURAL; INFILTRATION; INTRACAUDAL; PERINEURAL at 11:24

## 2025-03-31 ASSESSMENT — PAIN SCALES - GENERAL: PAINLEVEL_OUTOF10: MODERATE PAIN (4)

## 2025-03-31 NOTE — PROGRESS NOTES
Subjective:    74-year-old gentleman with left shoulder pain.  He has had pain for years and just seems to be getting worse.  He is never had any injections into it and just kind of went along with the pain up until now.  He finally got to a point where he felt like he needed to have someone take a look at his shoulder.  Of note he has cerebral palsy that affects the right arm and has had surgery on that to reposition the hand more appropriately.    Objective:    On examination today this is a 74-year-old gentleman no acute distress.  Very pleasant on examination.  He has poor range of motion of his left shoulder up to about 110 degrees of forward flexion.  Abduction to approximately 80.  He has good strength in the rotator cuff in all planes.  He is tender to palpation at Codman's point, tender palpation the AC joint tender to palpation of bicipital groove.  Testing of the rotator cuff reveals that he has full strength in all planes.    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  He tolerated it well and had good relief of his pain.  We are going to see him back on an as-needed basis once his pain returns.  He is currently working on some cardiac issues that he would need to have settled before we consider him for a total shoulder arthroplasty.    A steroid injection was performed at left glenohumeral joint using 1% plain Lidocaine and 40 mg of Kenalog. This was well tolerated.

## 2025-04-01 ENCOUNTER — OFFICE VISIT (OUTPATIENT)
Dept: CARDIOLOGY | Facility: OTHER | Age: 75
End: 2025-04-01
Attending: NURSE PRACTITIONER
Payer: MEDICARE

## 2025-04-01 VITALS
WEIGHT: 246 LBS | OXYGEN SATURATION: 94 % | BODY MASS INDEX: 33.32 KG/M2 | HEART RATE: 80 BPM | HEIGHT: 72 IN | RESPIRATION RATE: 18 BRPM | DIASTOLIC BLOOD PRESSURE: 66 MMHG | SYSTOLIC BLOOD PRESSURE: 102 MMHG | TEMPERATURE: 98.1 F

## 2025-04-01 DIAGNOSIS — R06.09 DOE (DYSPNEA ON EXERTION): ICD-10-CM

## 2025-04-01 DIAGNOSIS — R00.0 RACING HEART BEAT: ICD-10-CM

## 2025-04-01 DIAGNOSIS — R06.02 SOB (SHORTNESS OF BREATH): ICD-10-CM

## 2025-04-01 DIAGNOSIS — R60.0 BILATERAL LOWER EXTREMITY EDEMA: ICD-10-CM

## 2025-04-01 DIAGNOSIS — R00.2 PALPITATIONS: Primary | ICD-10-CM

## 2025-04-01 PROCEDURE — G0463 HOSPITAL OUTPT CLINIC VISIT: HCPCS

## 2025-04-01 PROCEDURE — 93005 ELECTROCARDIOGRAM TRACING: CPT | Performed by: NURSE PRACTITIONER

## 2025-04-01 RX ORDER — METOPROLOL SUCCINATE 25 MG/1
25 TABLET, EXTENDED RELEASE ORAL DAILY
Qty: 90 TABLET | Refills: 3 | Status: SHIPPED | OUTPATIENT
Start: 2025-04-01

## 2025-04-01 ASSESSMENT — PAIN SCALES - GENERAL: PAINLEVEL_OUTOF10: NO PAIN (0)

## 2025-04-01 NOTE — PATIENT INSTRUCTIONS
You will receive a phone call to schedule echocardiogram.   Start Toprol XL 25 mg daily.   Stop Amlodipine with starting new medication.  Follow-up with cardiology in about 2-3 months, sooner if needed.

## 2025-04-01 NOTE — PROGRESS NOTES
[facility] HEART CARE   CARDIOLOGY CONSULT     Jarad Purvis   [id number] [facility] [id number]    Neal Enriquez     Chief Complaint   Patient presents with    Referral     2/24/2025 Rapid HR and Zio patch results from 3/10/2025       HPI:   Mr. Purvis is a 74 year old who presents for cardiology evaluation with reports of rapid heart beat. Patient has a history of HTN, cerebral palsy, hiatal hernia and sleep apnea.     Patient was evaluated in the ED on 2/24/2025 due to racing heart with dyspnea. Lab evaluation largely unremarkable, EKG revealing SR with first degree AV block, LBBB. He was discharged with ZIO monitor.     Today patient describes episodes of feeling congestion over forehead and tingling into neck with shortness of breath and rapid heart beat. He has also had left arm paraesthesias related to diagnosed impingement syndrome. No lightheadedness or syncope. No chest pain or pressure. Admits that he has not had any episodes in some time now.  He has chronic LE edema, right worse than left due to CP. He does wear compression stockings.     He did have an episode as described above when wearing ZIO monitor, this correlated to sinus tachycardia with rate 106 bpm. Patient triggered events largely correlated to sinus tachycardia and ectopic beats.     Additionally, patient reports increased HOFFMAN over past 6 months.     RELEVANT TESTING REVIEWED:  CTA Head and Neck 3/26/2025  IMPRESSION:   HEAD CT:  1.  No CT finding of a mass, hemorrhage or focal area suggestive of acute infarct.  2.  Focal areas of encephalomalacia left basal ganglia, subcortical white matter left frontal lobe and left parietal lobe near the vertex.  HEAD CTA:  1.  No discrete vessel occlusion, significant stenosis, aneurysm or high flow vascular malformation involving the arteries of the [place].  NECK CTA:  1.  Normal configuration of the great vessels off the aortic arch with no significant stenosis of their origins.  2.   No significant stenosis or irregularity involving the arteries of the neck by NASCET criteria.  3.  No radiographic evidence of dissection.     O 3/2025  Findings: Patient's monitor was in place for 8 days. There were 9 triggered events and 7 diary entries during which time the noted rhythms were sinus rhythm, supraventricular ectopy and ventricular ectopy.    Patient had a min HR of 39 bpm, max HR of 136 bpm, and avg HR of 80 bpm. Predominant underlying rhythm was Sinus Rhythm. First Degree AV Block was present. 2 Supraventricular Tachycardia runs occurred, the run with the fastest interval lasting 15 beats with a max rate of 126 bpm (avg 121 bpm); the run with the fastest interval was also the longest. Isolated SVEs were rare (<1.0%), SVE Couplets were rare (<1.0%), and SVE Triplets were rare (<1.0%). Isolated VEs were rare (<1.0%), and no VE Couplets or VE Triplets were present.  Impression: Zio patch monitor showing predominantly sinus rhythm with rare supraventricular and ventricular ectopy that appears to be symptomatic.  First-degree AV block was present.  Jacinta Hector DO    PAST MEDICAL HISTORY:   No past medical history on file.       FAMILY HISTORY:   Family History   Problem Relation Age of Onset    Breast Cancer Mother         Cancer-breast    Heart Disease Father 74        Heart Disease,MI    Hypertension Brother         Hypertension    Arthritis Brother         Arthritis,Rheumatoid    Diabetes No family hx of         Diabetes          PAST SURGICAL HISTORY:   Past Surgical History:   Procedure Laterality Date    CHOLECYSTECTOMY      No Comments Provided    COLONOSCOPY  06/01/2010    ESOPHAGOSCOPY, GASTROSCOPY, DUODENOSCOPY (EGD), COMBINED      1998    EXTRACAPSULAR CATARACT EXTRATION WITH INTRAOCULAR LENS IMPLANT      rt    OTHER SURGICAL HISTORY      28405.1,[place] LAP VENTRAL INCISIONAL HERNIORRAPHY,with mesh    OTHER SURGICAL HISTORY      23321.0,[place] FUSION OF WRIST JOINT    OTHER SURGICAL HISTORY       025130,OTHER    OTHER SURGICAL HISTORY      046821,BONE MARROW ASPIRATION          SOCIAL HISTORY:   Social History     Socioeconomic History    Marital status:      Spouse name: None    Number of children: None    Years of education: None    Highest education level: None   Tobacco Use    Smoking status: Former     Current packs/day: 0.00     Types: Cigarettes     Quit date: 10/8/1985     Years since quittin.5    Smokeless tobacco: Former   Vaping Use    Vaping status: Never Used   Substance and Sexual Activity    Alcohol use: Yes     Alcohol/week: 2.0 standard drinks of alcohol     Types: 2 Standard drinks or equivalent per week     Comment: occasional    Drug use: No    Sexual activity: Not Currently     Partners: Female   Social History Narrative    Dorothy Esparza Spouse    Arleen Child age 13    Balwinder Child age 8    .  Works for developmentally disabled adults.  Three children     Social Drivers of Health     Financial Resource Strain: Low Risk  (2024)    Financial Resource Strain     Within the past 12 months, have you or your family members you live with been unable to get utilities (heat, electricity) when it was really needed?: No   Food Insecurity: Low Risk  (2024)    Food Insecurity     Within the past 12 months, did you worry that your food would run out before you got money to buy more?: No     Within the past 12 months, did the food you bought just not last and you didn t have money to get more?: No   Transportation Needs: Low Risk  (2024)    Transportation Needs     Within the past 12 months, has lack of transportation kept you from medical appointments, getting your medicines, non-medical meetings or appointments, work, or from getting things that you need?: No   Physical Activity: Unknown (2024)    Exercise Vital Sign     Days of Exercise per Week: 4 days   Stress: Stress Concern Present (2024)    [employer] - Occupational Stress  Questionnaire     Feeling of Stress : To some extent   Social Connections: Unknown (9/26/2024)    Social Connection and Isolation Panel [NHANES]     Frequency of Social Gatherings with Friends and Family: Once a week   Interpersonal Safety: Low Risk  (4/1/2025)    Interpersonal Safety     Do you feel physically and emotionally safe where you currently live?: Yes     Within the past 12 months, have you been hit, slapped, kicked or otherwise physically hurt by someone?: No     Within the past 12 months, have you been humiliated or emotionally abused in other ways by your partner or ex-partner?: No   Housing Stability: Low Risk  (9/26/2024)    Housing Stability     Do you have housing? : Yes     Are you worried about losing your housing?: No          CURRENT MEDICATIONS:   Current Outpatient Medications   Medication Sig Dispense Refill    albuterol (PROAIR HFA/PROVENTIL HFA/VENTOLIN HFA) 108 (90 Base) MCG/ACT inhaler Inhale 2 puffs into the lungs every 6 hours as needed for shortness of breath, wheezing or cough. 18 g 5    fluticasone (FLONASE) 50 MCG/ACT nasal spray Spray 2 sprays into both nostrils daily 16 g 11    hydrochlorothiazide (HYDRODIURIL) 25 MG tablet Take 1 tablet (25 mg) by mouth daily. 90 tablet 4    metoprolol succinate ER (TOPROL XL) 25 MG 24 hr tablet Take 1 tablet (25 mg) by mouth daily. 90 tablet 3    Multiple Vitamins-Minerals (MULTI-VITAMIN/MINERALS) TABS Take 1 tablet by mouth daily      multivitamin  with lutein (OCUVITE WITH LUTEIN) CAPS per capsule Take 1 capsule by mouth daily      order for DME Equipment being ordered: CPAP and supplies 1 Units 0    potassium 99 MG TABS       Sharps Container (SHARPS-A-GATOR LOCKING BRACKET) MISC CPAP, heated humidifier, mask, headgear, filters and tubing. For home use. Pressure: ?  Length of Need: lifetime      tamsulosin (FLOMAX) 0.4 MG capsule Take 1 capsule (0.4 mg) by mouth daily. 90 capsule 4    triamcinolone (ARISTOCORT HP) 0.5 % external cream Apply  topically 2 times daily. 30 g 3     No current facility-administered medications for this visit.         ALLERGIES:   Allergies   Allergen Reactions    Phenobarbital Hives        ROS:   CONSTITUTIONAL: No reported fever or chills. No changes in weight.  ENT: No visual disturbance, ear ache, epistaxis or sore throat.   CARDIOVASCULAR: No chest pain, chest pressure or chest discomfort. Positive for episodes of palpitations- see HPI. Chornic lower extremity edema without progression.    RESPIRATORY: Positive for increased dyspnea upon exertion. No cough, wheezing or hemoptysis.   GI: No reported abdominal pain.   : No reported hematuria or dysuria.  NEUROLOGICAL: No lightheadedness, dizziness or syncope.  HEMATOLOGIC: No history of anemia. No bleeding or excessive bruising. No history of blood clots.   MUSCULOSKELETAL: No new joint pain or swelling, no muscle pain.  ENDOCRINOLOGIC: No temperature intolerance. No hair or skin changes.  SKIN: No abnormal rashes or sores, no unusual itching.      PHYSICAL EXAM:   /66 (BP Location: Left arm, Patient Position: Sitting, Cuff Size: Adult Large)   Pulse 80   Temp 98.1  F (36.7  C) (Temporal)   Resp 18   Ht 1.829 m (6')   Wt 111.6 kg (246 lb)   SpO2 94%   BMI 33.36 kg/m    GENERAL: The patient is a well-developed, well-nourished, in no apparent distress.  HEENT: Head is normocephalic and atraumatic. Eyes are symmetrical with normal visual tracking. No icterus, no xanthelasmas. Nares appeared normal without nasal drainage. Mucous membranes are moist, no cyanosis.  NECK: Supple, no cervical bruits, JVP not visible.   CHEST/ LUNGS: Lungs clear to auscultation, no rales, rhonchi or wheezes, no use of accessory muscles, no retractions, respirations unlabored and normal respiratory rate.   CARDIO: Regular rate and rhythm normal with S1 and S2, no S3 or S4 and no murmur, click or rub.   ABD: Abdomen is nondistended.  EXTREMITIES: Trace left LE edema, 1+ RLE edema.     MUSCULOSKELETAL: No visible joint swelling.   NEUROLOGIC: Alert and oriented X3. Normal speech, gait and affect. No focal neurologic deficits.   SKIN: No jaundice. No rashes or visible skin lesions present. No ecchymosis.     EKG:    SR, rate 67 bpm with first degree AV block.    LAB RESULTS:   [facility]/Nurse Visit on 02/24/2025   Component Date Value Ref Range Status    Zio Prelim Results 03/10/2025    Preliminary                    Value:Patient had a min HR of 39 bpm, max HR of 136 bpm, and avg HR of 80 bpm. Predominant underlying rhythm was Sinus Rhythm. First Degree AV Block was present. 2 Supraventricular Tachycardia runs occurred, the run with the fastest interval lasting 15 beats with a max rate of 126 bpm (avg 121 bpm); the run with the fastest interval was also the longest. Isolated SVEs were rare (<1.0%), SVE Couplets were rare (<1.0%), and SVE Triplets were rare (<1.0%). Isolated VEs were rare (<1.0%), and no VE Couplets or VE Triplets were present.          ASSESSMENT:   Jarad Purvis presents for cardiology evaluation with reports of rapid heart beat. Patient has a history of HTN, cerebral palsy, hiatal hernia and sleep apnea.   Today patient describes episodes of feeling congestion over forehead and tingling into neck with shortness of breath and rapid heart beat. He has also had left arm paraesthesias related to diagnosed impingement syndrome. No lightheadedness or syncope. No chest pain or pressure. Admits that he has not had any episodes in some time now.  He has chronic LE edema, right worse than left due to CP. He does wear compression stockings.     PLAN:   1. Palpitations (Primary)  -Patient with episodes of palpitations, see HPI.   -ZIO reviewed, he did have an episode as described above when wearing ZIO monitor, this correlated to sinus tachycardia with rate 106 bpm. Patient triggered events largely correlated to sinus tachycardia and ectopic beats. No arrhythmias appreciated.    -Start Toprol XL 25 mg daily for symptomatic sinus tachycardia.   -Stop Amlodipine with starting beta blocker to avoid hypotension.   -TTE for structural exam.     - Echocardiogram Complete; Future  - metoprolol succinate ER (TOPROL XL) 25 MG 24 hr tablet; Take 1 tablet (25 mg) by mouth daily.  Dispense: 90 tablet; Refill: 3    2. Racing heart beat  -See above.     3. SOB (shortness of breath)  -TTE for structural exam, consider stress test if progressive HOFFMAN.     - EKG 12-lead, tracing only (Same Day)    4. Bilateral lower extremity edema  -Continue with compression stockings.   -He has been on hydrochlorothiazide 25 mg daily.     - Echocardiogram Complete; Future    Orders Placed This Encounter   Procedures    EKG 12-lead, tracing only (Same Day)    Echocardiogram Complete     Thank you for allowing me to participate in the care of your patient. Please do not hesitate to contact me if you have any questions.     Total time 47 minutes on date of encounter spent reviewing records, face to face time obtaining HPI, physical exam, reviewing results of recent testing and counseling on the above diagnoses and recommended plan of care.     Radha Del Rosario, APRN CNP [facility]

## 2025-04-01 NOTE — NURSING NOTE
Chief Complaint   Patient presents with    Referral     2/24/2025 Rapid HR and Zio patch results from 3/10/2025        Initial /66 (BP Location: Left arm, Patient Position: Sitting, Cuff Size: Adult Large)   Pulse 80   Temp 98.1  F (36.7  C) (Temporal)   Resp 18   Ht 1.829 m (6')   Wt 111.6 kg (246 lb)   SpO2 94%   BMI 33.36 kg/m   Estimated body mass index is 33.36 kg/m  as calculated from the following:    Height as of this encounter: 1.829 m (6').    Weight as of this encounter: 111.6 kg (246 lb).  Meds Reconciled: complete  Pt is not on Aspirin  Pt is not on a Statin  Pt is not on Xarelto or Eliquis  Pt is not on a Warfarin   PHQ and/or BHARGAV reviewed. Pt referred to PCP/MH Provider as appropriate.    Raysa Krause LPN

## 2025-04-02 LAB
ATRIAL RATE - MUSE: 67 BPM
DIASTOLIC BLOOD PRESSURE - MUSE: NORMAL MMHG
INTERPRETATION ECG - MUSE: NORMAL
P AXIS - MUSE: 54 DEGREES
PR INTERVAL - MUSE: 228 MS
QRS DURATION - MUSE: 96 MS
QT - MUSE: 390 MS
QTC - MUSE: 412 MS
R AXIS - MUSE: 47 DEGREES
SYSTOLIC BLOOD PRESSURE - MUSE: NORMAL MMHG
T AXIS - MUSE: 50 DEGREES
VENTRICULAR RATE- MUSE: 67 BPM

## 2025-04-08 ENCOUNTER — HOSPITAL ENCOUNTER (OUTPATIENT)
Dept: CARDIOLOGY | Facility: OTHER | Age: 75
Discharge: HOME | End: 2025-04-08
Attending: NURSE PRACTITIONER
Payer: MEDICARE

## 2025-04-08 DIAGNOSIS — R06.09 DOE (DYSPNEA ON EXERTION): ICD-10-CM

## 2025-04-08 DIAGNOSIS — R00.2 PALPITATIONS: ICD-10-CM

## 2025-04-08 DIAGNOSIS — R60.0 BILATERAL LOWER EXTREMITY EDEMA: ICD-10-CM

## 2025-04-08 LAB — LVEF ECHO: NORMAL

## 2025-04-08 PROCEDURE — 93306 TTE W/DOPPLER COMPLETE: CPT

## 2025-05-03 ENCOUNTER — APPOINTMENT (OUTPATIENT)
Dept: GENERAL RADIOLOGY | Facility: OTHER | Age: 75
End: 2025-05-03
Payer: MEDICARE

## 2025-05-03 ENCOUNTER — HOSPITAL ENCOUNTER (EMERGENCY)
Facility: OTHER | Age: 75
Discharge: HOME | End: 2025-05-03
Payer: MEDICARE

## 2025-05-03 VITALS
SYSTOLIC BLOOD PRESSURE: 127 MMHG | DIASTOLIC BLOOD PRESSURE: 85 MMHG | HEART RATE: 92 BPM | OXYGEN SATURATION: 93 % | RESPIRATION RATE: 18 BRPM | TEMPERATURE: 97.8 F

## 2025-05-03 DIAGNOSIS — M25.561 PAIN AND SWELLING OF RIGHT KNEE: ICD-10-CM

## 2025-05-03 DIAGNOSIS — M25.461 PAIN AND SWELLING OF RIGHT KNEE: ICD-10-CM

## 2025-05-03 DIAGNOSIS — M71.9 BURSITIS: ICD-10-CM

## 2025-05-03 LAB
ANION GAP SERPL CALCULATED.3IONS-SCNC: 11 MMOL/L (ref 7–15)
BASOPHILS # BLD AUTO: 0 10E3/UL (ref 0–0.2)
BASOPHILS NFR BLD AUTO: 0 %
BUN SERPL-MCNC: 17.2 MG/DL (ref 8–23)
CALCIUM SERPL-MCNC: 8.9 MG/DL (ref 8.8–10.4)
CHLORIDE SERPL-SCNC: 96 MMOL/L (ref 98–107)
CREAT SERPL-MCNC: 0.85 MG/DL (ref 0.67–1.17)
CRP SERPL-MCNC: 31.93 MG/L
EGFRCR SERPLBLD CKD-EPI 2021: 90 ML/MIN/1.73M2
EOSINOPHIL # BLD AUTO: 0.1 10E3/UL (ref 0–0.7)
EOSINOPHIL NFR BLD AUTO: 1 %
ERYTHROCYTE [DISTWIDTH] IN BLOOD BY AUTOMATED COUNT: 13.3 % (ref 10–15)
ERYTHROCYTE [SEDIMENTATION RATE] IN BLOOD BY WESTERGREN METHOD: 29 MM/HR (ref 0–20)
GLUCOSE SERPL-MCNC: 97 MG/DL (ref 70–99)
HCO3 SERPL-SCNC: 25 MMOL/L (ref 22–29)
HCT VFR BLD AUTO: 41.6 % (ref 40–53)
HGB BLD-MCNC: 14.3 G/DL (ref 13.3–17.7)
IMM GRANULOCYTES # BLD: 0 10E3/UL
IMM GRANULOCYTES NFR BLD: 0 %
LYMPHOCYTES # BLD AUTO: 1.7 10E3/UL (ref 0.8–5.3)
LYMPHOCYTES NFR BLD AUTO: 16 %
MCH RBC QN AUTO: 30.9 PG (ref 26.5–33)
MCHC RBC AUTO-ENTMCNC: 34.4 G/DL (ref 31.5–36.5)
MCV RBC AUTO: 90 FL (ref 78–100)
MONOCYTES # BLD AUTO: 1.2 10E3/UL (ref 0–1.3)
MONOCYTES NFR BLD AUTO: 12 %
NEUTROPHILS # BLD AUTO: 7.4 10E3/UL (ref 1.6–8.3)
NEUTROPHILS NFR BLD AUTO: 71 %
NRBC # BLD AUTO: 0 10E3/UL
NRBC BLD AUTO-RTO: 0 /100
PLATELET # BLD AUTO: 245 10E3/UL (ref 150–450)
POTASSIUM SERPL-SCNC: 3.8 MMOL/L (ref 3.4–5.3)
RBC # BLD AUTO: 4.63 10E6/UL (ref 4.4–5.9)
SODIUM SERPL-SCNC: 132 MMOL/L (ref 135–145)
WBC # BLD AUTO: 10.5 10E3/UL (ref 4–11)

## 2025-05-03 PROCEDURE — 99284 EMERGENCY DEPT VISIT MOD MDM: CPT | Mod: 25

## 2025-05-03 PROCEDURE — 80048 BASIC METABOLIC PNL TOTAL CA: CPT

## 2025-05-03 PROCEDURE — 85652 RBC SED RATE AUTOMATED: CPT

## 2025-05-03 PROCEDURE — 250N000009 HC RX 250

## 2025-05-03 PROCEDURE — 73562 X-RAY EXAM OF KNEE 3: CPT | Mod: 26 | Performed by: RADIOLOGY

## 2025-05-03 PROCEDURE — 85025 COMPLETE CBC W/AUTO DIFF WBC: CPT

## 2025-05-03 PROCEDURE — 36415 COLL VENOUS BLD VENIPUNCTURE: CPT

## 2025-05-03 PROCEDURE — 99283 EMERGENCY DEPT VISIT LOW MDM: CPT | Mod: 25

## 2025-05-03 PROCEDURE — 86140 C-REACTIVE PROTEIN: CPT

## 2025-05-03 PROCEDURE — 250N000013 HC RX MED GY IP 250 OP 250 PS 637

## 2025-05-03 PROCEDURE — 20610 DRAIN/INJ JOINT/BURSA W/O US: CPT | Mod: RT

## 2025-05-03 PROCEDURE — 73562 X-RAY EXAM OF KNEE 3: CPT | Mod: RT

## 2025-05-03 PROCEDURE — 87040 BLOOD CULTURE FOR BACTERIA: CPT

## 2025-05-03 RX ORDER — SULFAMETHOXAZOLE AND TRIMETHOPRIM 800; 160 MG/1; MG/1
2 TABLET ORAL 2 TIMES DAILY
Qty: 40 TABLET | Refills: 0 | Status: SHIPPED | OUTPATIENT
Start: 2025-05-03 | End: 2025-05-13

## 2025-05-03 RX ORDER — SULFAMETHOXAZOLE AND TRIMETHOPRIM 800; 160 MG/1; MG/1
2 TABLET ORAL ONCE
Status: COMPLETED | OUTPATIENT
Start: 2025-05-03 | End: 2025-05-03

## 2025-05-03 RX ORDER — SULFAMETHOXAZOLE AND TRIMETHOPRIM 800; 160 MG/1; MG/1
1 TABLET ORAL ONCE
Status: DISCONTINUED | OUTPATIENT
Start: 2025-05-03 | End: 2025-05-03

## 2025-05-03 RX ORDER — ACETAMINOPHEN 325 MG/1
975 TABLET ORAL ONCE
Status: COMPLETED | OUTPATIENT
Start: 2025-05-03 | End: 2025-05-03

## 2025-05-03 RX ADMIN — LIDOCAINE HYDROCHLORIDE 5 ML: 10 INJECTION, SOLUTION EPIDURAL; INFILTRATION; INTRACAUDAL; PERINEURAL at 18:56

## 2025-05-03 RX ADMIN — SULFAMETHOXAZOLE AND TRIMETHOPRIM 2 TABLET: 800; 160 TABLET ORAL at 20:30

## 2025-05-03 RX ADMIN — ACETAMINOPHEN 975 MG: 325 TABLET ORAL at 18:58

## 2025-05-03 ASSESSMENT — COLUMBIA-SUICIDE SEVERITY RATING SCALE - C-SSRS
6. HAVE YOU EVER DONE ANYTHING, STARTED TO DO ANYTHING, OR PREPARED TO DO ANYTHING TO END YOUR LIFE?: NO
1. IN THE PAST MONTH, HAVE YOU WISHED YOU WERE DEAD OR WISHED YOU COULD GO TO SLEEP AND NOT WAKE UP?: NO
2. HAVE YOU ACTUALLY HAD ANY THOUGHTS OF KILLING YOURSELF IN THE PAST MONTH?: NO

## 2025-05-03 ASSESSMENT — ACTIVITIES OF DAILY LIVING (ADL)
ADLS_ACUITY_SCORE: 41

## 2025-05-03 NOTE — ED TRIAGE NOTES
Pt arrives with concerns of right knee swelling, redness and pain that started yesterday. Pt states he has taken ibuprofen today with no relief. No known injury to the site. Denies fevers. No cellulitis history.  /79   Pulse 110   Temp 97.8  F (36.6  C) (Tympanic)   Resp 18   SpO2 94%       Triage Assessment (Adult)       Row Name 05/03/25 0338          Triage Assessment    Airway WDL WDL        Respiratory WDL    Respiratory WDL WDL        Skin Circulation/Temperature WDL    Skin Circulation/Temperature WDL X        Cardiac WDL    Cardiac WDL WDL        Peripheral/Neurovascular WDL    Peripheral Neurovascular WDL X        Cognitive/Neuro/Behavioral WDL    Cognitive/Neuro/Behavioral WDL WDL

## 2025-05-03 NOTE — ED PROVIDER NOTES
History     Chief Complaint   Patient presents with    Knee Pain     The history is provided by the patient and medical records.     Jarad Purvis is a 74 year old male who presents to the emergency department today complaining of right knee swelling and redness.  Swelling started yesterday, noticed the redness today after he was out raking the yard.  It hurts to walk on the knee.  Pain is anterior knee.  No trauma or injury.  No fevers. No known tick bites. No history of gout. Denies alcohol. Eats hamburger, protein, no seafood.       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    Past Surgical History:    Past Surgical History:   Procedure Laterality Date    CHOLECYSTECTOMY      No Comments Provided    COLONOSCOPY  06/01/2010    ESOPHAGOSCOPY, GASTROSCOPY, DUODENOSCOPY (EGD), COMBINED      1998    EXTRACAPSULAR CATARACT EXTRATION WITH INTRAOCULAR LENS IMPLANT       rt    OTHER SURGICAL HISTORY      02572.1,ME LAP VENTRAL INCISIONAL HERNIORRAPHY,with mesh    OTHER SURGICAL HISTORY      85959.0,ME FUSION OF WRIST JOINT    OTHER SURGICAL HISTORY      [id number],OTHER    OTHER SURGICAL HISTORY      [id number],BONE MARROW ASPIRATION       Family History:    Family History   Problem Relation Age of Onset    Breast Cancer Mother         Cancer-breast    Heart Disease Father 74        Heart Disease,MI    Hypertension Brother         Hypertension    Arthritis Brother         Arthritis,Rheumatoid    Diabetes No family hx of         Diabetes       Social History:  Marital Status:   [2]  Social History     Tobacco Use    Smoking status: Former     Current packs/day: 0.00     Types: Cigarettes     Quit date: 10/8/1985     Years since quittin.5    Smokeless tobacco: Former   Vaping Use    Vaping status: Never Used   Substance Use Topics    Alcohol use: Yes     Alcohol/week: 2.0 standard drinks of alcohol     Types: 2 Standard drinks or equivalent per week     Comment: occasional    Drug use: No        Medications:    sulfamethoxazole-trimethoprim (BACTRIM DS) 800-160 MG tablet  albuterol (PROAIR HFA/PROVENTIL HFA/VENTOLIN HFA) 108 (90 Base) MCG/ACT inhaler  fluticasone (FLONASE) 50 MCG/ACT nasal spray  hydrochlorothiazide (HYDRODIURIL) 25 MG tablet  metoprolol succinate ER (TOPROL XL) 25 MG 24 hr tablet  Multiple Vitamins-Minerals (MULTI-VITAMIN/MINERALS) TABS  multivitamin  with lutein (OCUVITE WITH LUTEIN) CAPS per capsule  order for DME  potassium 99 MG TABS  Sharps Container (SHARPS-A-GATOR LOCKING BRACKET) MISC  tamsulosin (FLOMAX) 0.4 MG capsule  triamcinolone (ARISTOCORT HP) 0.5 % external cream          Review of Systems   Musculoskeletal:         Right knee pain, swelling redness   All other systems reviewed and are negative.      Physical Exam   BP: 117/79  Pulse: 110  Temp: 97.8  F (36.6  C)  Resp: 18  SpO2: 94 %      Physical Exam  Vitals and nursing note reviewed.    Constitutional:       General: He is not in acute distress.     Appearance: He is not ill-appearing or toxic-appearing.   Cardiovascular:      Rate and Rhythm: Normal rate and regular rhythm.      Pulses: Normal pulses.   Pulmonary:      Effort: Pulmonary effort is normal.   Musculoskeletal:      Cervical back: Neck supple.      Right knee: Swelling and erythema present. Normal range of motion.   Skin:     General: Skin is warm.   Neurological:      General: No focal deficit present.      Mental Status: He is alert.   Psychiatric:         Mood and Affect: Mood normal.         ED Course       Arthrocentesis    Date/Time: 5/3/2025 7:43 PM    Performed by: Danyelle Allen APRN CNP  Authorized by: Danyelle Allen APRN CNP    Risks, benefits and alternatives discussed.      UNIVERSAL PROTOCOL   Site Marked: Yes    LOCATION:   Location:  Knee  ANESTHESIA (see MAR for exact dosages):   Anesthesia method:  Local infiltration  Local anesthetic:  Lidocaine 1% w/o epi    PROCEDURE DETAILS:     Needle gauge:  18 G    Approach:  Medial    Aspirate amount:  0    Steroid injected: no      Specimen collected: no    Dressing: adhesive bandage      PROCEDURE  Describe Procedure: Sterile technique to attempt to aspirate joint fluid.     Patient Tolerance:  Patient tolerated the procedure well with no immediate complications        Results for orders placed or performed during the hospital encounter of 05/03/25 (from the past 24 hours)   XR Knee Right 3 Views    Narrative    EXAM: XR KNEE RIGHT 3 VIEWS  LOCATION: [facility]  DATE: 5/3/2025    INDICATION: swelling  COMPARISON: None.      Impression    IMPRESSION: Chronic enthesitis along the poles of the patella. Mild prepatellar edema but no evidence for fracture or intra-articular effusion.   CBC with platelets differential    Narrative    The following orders were created for panel order CBC with platelets differential.  Procedure                                Abnormality         Status                     ---------                               -----------         ------                     CBC with platelets and ...[[id number]]                      Final result                 Please view results for these tests on the individual orders.   Basic metabolic panel   Result Value Ref Range    Sodium 132 (L) 135 - 145 mmol/L    Potassium 3.8 3.4 - 5.3 mmol/L    Chloride 96 (L) 98 - 107 mmol/L    Carbon Dioxide (CO2) 25 22 - 29 mmol/L    Anion Gap 11 7 - 15 mmol/L    Urea Nitrogen 17.2 8.0 - 23.0 mg/dL    Creatinine 0.85 0.67 - 1.17 mg/dL    GFR Estimate 90 >60 mL/min/1.73m2    Calcium 8.9 8.8 - 10.4 mg/dL    Glucose 97 70 - 99 mg/dL   Erythrocyte sedimentation rate auto   Result Value Ref Range    Erythrocyte Sedimentation Rate 29 (H) 0 - 20 mm/hr   CRP inflammation   Result Value Ref Range    CRP Inflammation 31.93 (H) <5.00 mg/L   CBC with platelets and differential   Result Value Ref Range    WBC Count 10.5 4.0 - 11.0 10e3/uL    RBC Count 4.63 4.40 - 5.90 10e6/uL    Hemoglobin 14.3 13.3 - 17.7 g/dL    Hematocrit 41.6 40.0 - 53.0 %    MCV 90 78 - 100 fL    MCH 30.9 26.5 - 33.0 pg    MCHC 34.4 31.5 - 36.5 g/dL    RDW 13.3 10.0 - 15.0 %    Platelet Count 245 150 - 450 10e3/uL    % Neutrophils 71 %    % Lymphocytes 16 %    % Monocytes 12 %    % Eosinophils 1 %    % Basophils 0 %    % Immature Granulocytes 0 %    NRBCs per 100 WBC 0 <1 /100    Absolute Neutrophils 7.4 1.6 - 8.3 10e3/uL    Absolute Lymphocytes 1.7 0.8 - 5.3 10e3/uL    Absolute Monocytes 1.2 0.0 - 1.3 10e3/uL    Absolute Eosinophils 0.1 0.0 - 0.7 10e3/uL    Absolute Basophils 0.0 0.0 - 0.2 10e3/uL    Absolute Immature Granulocytes 0.0 <=0.4 10e3/uL    Absolute NRBCs 0.0 10e3/uL       Medications   sulfamethoxazole-trimethoprim (BACTRIM DS) 800-160 MG per tablet 2 tablet (has no administration in time range)   lidocaine 1 % 5 mL (5 mLs Other $Given by Other 5/3/25 0730)   acetaminophen (TYLENOL) tablet 975 mg  (975 mg Oral $Given 5/3/25 2732)       Assessments & Plan (with Medical Decision Making)  Jarad Purvis is a 74 year old male who presents to the emergency department today complaining of right knee swelling and redness.  Swelling started yesterday, noticed the redness today after he was out raking the yard.  It hurts to walk on the knee.  Pain is anterior knee.  No trauma or injury.  No fevers. No known tick bites. No history of gout. Denies alcohol. Eats hamburger, protein, no seafood  /85   Pulse 92   Temp 97.8  F (36.6  C) (Tympanic)   Resp 18   SpO2 93%  He is vitally stable, afebrile, on room air.   He is alert, orientated, non-toxic. Pleasant. Atraumatic right knee pain with redness, warmth, mild swelling. He is not immunocompromised, diabetic. No blood thinners.   Differential diagnosis includes but not limited to: bursitis, septic joint, gout, pseudogout, septic bursitis, cellulitis  CBC: normal. BMP: unremarkable.  ESR: 29, CRP 31.93. Blood cultures pending  Pre-patellar bursitis vs less likely septic joint; Right knee is has erythema, warmth, but he is able to flex and extend his knee without complaints. Attempted sterile aspiration of fluid from joint, avoiding the erythema of the skin for medial insertion site, Dr. Russell assisting me, and we were unable to obtain fluid. Xray of joint shows: Chronic enthesitis along the poles of the patella. Mild prepatellar edema but no evidence for fracture or intra-articular effusion   He is given double dose bactrim here due to weight, prescription sent to pharmacy. Close follow up evaluation in 2 days, strict return precautions explained.      I have reviewed the nursing notes.    I have reviewed the findings, diagnosis, plan and need for follow up with the patient.    Medical Decision Making  The patient's presentation was of moderate complexity (knee joint redness, swelling, pain).    The patient's evaluation involved:  review of external note(s)  from 1 sources (see separate area of note for details)  ordering and/or review of 1 test(s) in this encounter (see separate area of note for details)  discussion of management or test interpretation with another health professional (see separate area of note for details)    The patient's management necessitated moderate risk (a decision regarding minor procedure (arthrocentesis ) with risk factors of none).      Current Discharge Medication List        START taking these medications    Details   sulfamethoxazole-trimethoprim (BACTRIM DS) 800-160 MG tablet Take 2 tablets by mouth 2 times daily for 10 days.  Qty: 40 tablet, Refills: 0             Final diagnoses:   Bursitis   Pain and swelling of right knee       5/3/2025   [facility] CenterDanyelle, APRN CNP  05/03/25 2030

## 2025-05-04 NOTE — DISCHARGE INSTRUCTIONS
We will treat you for an infection of the skin, bursa of the knee.   You will take bactrim twice a day for 10 days, prescription sent to your pharmacy. Follow up in the clinic for recheck in two days.   Return to be seen if fever, increased, pain, swelling.

## 2025-05-05 ENCOUNTER — OFFICE VISIT (OUTPATIENT)
Dept: FAMILY MEDICINE | Facility: OTHER | Age: 75
End: 2025-05-05
Attending: FAMILY MEDICINE
Payer: COMMERCIAL

## 2025-05-05 VITALS
RESPIRATION RATE: 20 BRPM | DIASTOLIC BLOOD PRESSURE: 64 MMHG | TEMPERATURE: 98 F | OXYGEN SATURATION: 96 % | HEART RATE: 72 BPM | BODY MASS INDEX: 32.55 KG/M2 | WEIGHT: 240 LBS | SYSTOLIC BLOOD PRESSURE: 120 MMHG

## 2025-05-05 DIAGNOSIS — M70.51 PATELLAR BURSITIS OF RIGHT KNEE: Primary | ICD-10-CM

## 2025-05-05 PROCEDURE — 99213 OFFICE O/P EST LOW 20 MIN: CPT | Performed by: FAMILY MEDICINE

## 2025-05-05 PROCEDURE — 3074F SYST BP LT 130 MM HG: CPT | Performed by: FAMILY MEDICINE

## 2025-05-05 PROCEDURE — G0463 HOSPITAL OUTPT CLINIC VISIT: HCPCS

## 2025-05-05 PROCEDURE — G2211 COMPLEX E/M VISIT ADD ON: HCPCS | Performed by: FAMILY MEDICINE

## 2025-05-05 PROCEDURE — 3078F DIAST BP <80 MM HG: CPT | Performed by: FAMILY MEDICINE

## 2025-05-05 PROCEDURE — 1125F AMNT PAIN NOTED PAIN PRSNT: CPT | Performed by: FAMILY MEDICINE

## 2025-05-05 ASSESSMENT — PAIN SCALES - GENERAL: PAINLEVEL_OUTOF10: MILD PAIN (1)

## 2025-05-05 NOTE — PROGRESS NOTES
SUBJECTIVE:   Jarad Purvis is a 74 year old male who presents to clinic today for the following health issues:    HPI  Patient arrives here for follow-up in ER.  He was recently seen for patellar bursitis.  He states he started Saturday his Bactrim.  He states that he feels that he has seen some improvement.  No fevers or chills.    Patient Active Problem List    Diagnosis Date Noted    Acute non-recurrent pansinusitis 09/26/2024     Priority: Medium    Morbid obesity (H) 08/29/2022     Priority: Medium    Cerebral palsy (H) 01/25/2018     Priority: Medium     Overview:   right arm flexion contractures      Diverticular disease of colon 01/25/2018     Priority: Medium    Hiatal hernia 01/25/2018     Priority: Medium    Hypertension 01/25/2018     Priority: Medium    Convulsions (H) 01/25/2018     Priority: Medium     Overview:   last seizure age 14  no longer on medications      Sleep apnea 01/25/2018     Priority: Medium    Elevated blood sugar 09/03/2015     Priority: Medium    Hemorrhoids 01/16/2015     Priority: Medium    Incontinence 09/30/2013     Priority: Medium    H/O colonoscopy 10/08/2012     Priority: Medium     Overview:   2008      Corns and callosities 12/12/2011     Priority: Medium    Personal history of other mental disorder 06/03/2010     Priority: Medium       Review of Systems     OBJECTIVE:     /64   Pulse 72   Temp 98  F (36.7  C)   Resp 20   Wt 108.9 kg (240 lb)   SpO2 96%   BMI 32.55 kg/m    Body mass index is 32.55 kg/m .  Physical Exam  Musculoskeletal:         General: Normal range of motion.      Comments: .Patient has good range of motion to his right knee without any significant pain.  The patella is red and inflamed.   Neurological:      Mental Status: He is alert.         Diagnostic Test Results:  none     ASSESSMENT/PLAN:         (M70.51) Patellar bursitis of right knee  (primary encounter diagnosis)  Patient is showing improvement.  Continue current care.  Follow-up  ----- Message from Sima Sneed MD sent at 6/12/2017 10:18 PM EDT -----  Please call patient with these results and let her know that they are stable, but cholesterol is still high. I think she can return for follow up as planned with Dr. Izquierdo in September.    Pt given lab results.dg   if patient's start worsening.  The longitudinal plan of care for the diagnosis(es)/condition(s) as documented were addressed during this visit. Due to the added complexity in care, I will continue to support Ang in the subsequent management and with ongoing continuity of care.      Neal Enriquez MD  [facility] AND [facility]

## 2025-05-05 NOTE — NURSING NOTE
Patient here for ER follow up for right knee pain. He is taking his antibiotic. Knee continues to be red and swollen with pain. Medication Reconciliation: complete.    Devi Vivar LPN  5/5/2025 10:36 AM

## 2025-05-08 LAB
BACTERIA BLD CULT: NO GROWTH
BACTERIA BLD CULT: NO GROWTH

## 2025-05-11 ENCOUNTER — NURSE TRIAGE (OUTPATIENT)
Dept: NURSING | Facility: CLINIC | Age: 75
End: 2025-05-11
Payer: COMMERCIAL

## 2025-05-12 ENCOUNTER — OFFICE VISIT (OUTPATIENT)
Dept: FAMILY MEDICINE | Facility: OTHER | Age: 75
End: 2025-05-12
Attending: PHYSICIAN ASSISTANT
Payer: COMMERCIAL

## 2025-05-12 VITALS
HEIGHT: 72 IN | RESPIRATION RATE: 20 BRPM | TEMPERATURE: 98 F | DIASTOLIC BLOOD PRESSURE: 78 MMHG | SYSTOLIC BLOOD PRESSURE: 108 MMHG | BODY MASS INDEX: 32.34 KG/M2 | HEART RATE: 74 BPM | OXYGEN SATURATION: 94 % | WEIGHT: 238.8 LBS

## 2025-05-12 DIAGNOSIS — M70.41 PREPATELLAR BURSITIS OF RIGHT KNEE: ICD-10-CM

## 2025-05-12 DIAGNOSIS — I10 PRIMARY HYPERTENSION: ICD-10-CM

## 2025-05-12 DIAGNOSIS — L27.0 DRUG ERUPTION: Primary | ICD-10-CM

## 2025-05-12 DIAGNOSIS — L56.8 DRUG-INDUCED PHOTOSENSITIVITY: ICD-10-CM

## 2025-05-12 DIAGNOSIS — T50.905A DRUG-INDUCED PHOTOSENSITIVITY: ICD-10-CM

## 2025-05-12 PROCEDURE — 3078F DIAST BP <80 MM HG: CPT | Performed by: PHYSICIAN ASSISTANT

## 2025-05-12 PROCEDURE — 1126F AMNT PAIN NOTED NONE PRSNT: CPT | Performed by: PHYSICIAN ASSISTANT

## 2025-05-12 PROCEDURE — 99214 OFFICE O/P EST MOD 30 MIN: CPT | Performed by: PHYSICIAN ASSISTANT

## 2025-05-12 PROCEDURE — 3074F SYST BP LT 130 MM HG: CPT | Performed by: PHYSICIAN ASSISTANT

## 2025-05-12 PROCEDURE — G0463 HOSPITAL OUTPT CLINIC VISIT: HCPCS

## 2025-05-12 RX ORDER — PREDNISONE 20 MG/1
TABLET ORAL
Qty: 14 TABLET | Refills: 0 | Status: SHIPPED | OUTPATIENT
Start: 2025-05-12 | End: 2025-05-24

## 2025-05-12 ASSESSMENT — PAIN SCALES - GENERAL: PAINLEVEL_OUTOF10: NO PAIN (0)

## 2025-05-12 NOTE — NURSING NOTE
Chief Complaint   Patient presents with    Medication Problem     Started couple days ago, think it may be from Bactrim, itchy, rash on face, back, chest, RT Knee, was soaking with aloe.        Initial /78 (BP Location: Right arm, Patient Position: Sitting, Cuff Size: Adult Large)   Pulse 74   Temp 98  F (36.7  C) (Temporal)   Resp 20   Ht 1.829 m (6')   Wt 108.3 kg (238 lb 12.8 oz)   SpO2 94%   BMI 32.39 kg/m   Estimated body mass index is 32.39 kg/m  as calculated from the following:    Height as of this encounter: 1.829 m (6').    Weight as of this encounter: 108.3 kg (238 lb 12.8 oz).  Medication Review: complete    The next two questions are to help us understand your food security.  If you are feeling you need any assistance in this area, we have resources available to support you today.          9/26/2024   SDOH- Food Insecurity   Within the past 12 months, did you worry that your food would run out before you got money to buy more? N   Within the past 12 months, did the food you bought just not last and you didn t have money to get more? N        Data saved with a previous flowsheet row definition         Health Care Directive:  Patient has a Health Care Directive on file      Sinai Arenas

## 2025-05-12 NOTE — PATIENT INSTRUCTIONS
You can take Claritin along with the Prednisone (Deltasone) to help with rash    Please refer to your AVS for follow up and pain/symptoms management recommendations (I.e.: medications, helpful conservative treatment modalities, appropriate follow up if need to a specialist or family practice, etc.). Please return to urgent care if your symptoms change or worsen.     Discharge instructions:  -If you were prescribed a medication(s), please take this as prescribed/directed  -Monitor your symptoms, if changing/worsening, return to UC/ER or PCP for follow up    Allergic Dermatitis   -Occurs by exposure to a substance or direct contact with an object that causes an allergic reaction. Nickel, certain medications and other plants (ie: poison ivy are common causes).   -Avoid the irritating agent and wash all objects/clothing that came in contact with the allergen.   -Treatment: antihistamines (Benadryl, Zyrtec, etc.) to alleviate the itching, topical corticosteroids (calamine or hydrocortisone cream) OR oral (prednisone).   -Cool compresses may help.   -Baking soda baths  -Recommend follow up with PCP, who may refer to dermatology if the rash does not get better with conservative treatments. If you notice fevers, chills, blistering, shortness of breath or chest pain, return to see your PCP or ER/UC immediately.   -Luke warm water baths - warmer water than this can dry out skin and cause further irritation

## 2025-05-12 NOTE — TELEPHONE ENCOUNTER
Reason for Disposition    Hives or itching    Additional Information    Negative: [1] Life-threatening reaction (anaphylaxis) in the past to the same drug AND [2] < 2 hours since exposure    Negative: Difficulty breathing or wheezing    Negative: [1] Hoarseness or cough AND [2] started soon after 1st dose of drug    Negative: [1] Swollen tongue AND [2] started soon after 1st dose of drug    Negative: [1] Purple or blood-colored rash (spots or dots) AND [2] fever    Negative: Sounds like a life-threatening emergency to the triager    Negative: Rash is only on 1 part of the body (localized)    Negative: Taking new non-prescription (OTC) antihistamine, decongestant, ear drops, eye drops, or other OTC cough/cold medicine    Negative: Taking new prescription antihistamine, allergy medicine, asthma medicine, eye drops, ear drops or nose drops    Negative: Rash started more than 3 days after stopping new prescription medicine    Negative: Swollen tongue    Negative: [1] Widespread hives AND [2] onset < 2 hours of exposure to 1st dose of drug    Negative: Fever    Negative: Patient sounds very sick or weak to the triager    Negative: [1] Purple or blood-colored rash (spots or dots) AND [2] no fever AND [3] sounds well to triager    Negative: [1] Taking new prescription medication AND [2] rash within 4 hours of 1st dose    Negative: Large or small blisters on skin (i.e., fluid filled bubbles or sacs)    Negative: Bloody crusts on lips or sores in mouth    Negative: Face or lip swelling    Protocols used: Rash - Widespread On Drugs-A-

## 2025-05-12 NOTE — PROGRESS NOTES
Assessment & Plan       ICD-10-CM    1. Drug eruption  L27.0 predniSONE (DELTASONE) 20 MG tablet      2. 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 Prepatellar bursitis of right knee  M70.41       4. Primary hypertension  I10         Drug eruption - photosensitivity rash with sulfa antibiotics, this was added to allergies as an adverse reaction. Provided education in regards to this. Stop sulfa/Bactrim. See #3. Start Prednisone as taper regimen from 40 mg to 10 mg over the next 12 days. Recommend to take Prednisone in the morning with food (Discussed risks, benefits and side effects of medication at length). Continue Claritin 10 mg daily. Utilize topical hydrocortisone if needed. If shortness of breath, progression of rash, difficulties with breathing, etc., recommend prompt reevaluation.   Drug induced photosensitivity - provided education in regard to this with Ang and wife, Robert. Stop Bactrim. Restart potassium.   Prepatellar bursitis of right knee - vastly improved, no signs of infection, therefore, with above photosensitivity reaction, will opt to stop Bactrim.   Primary hypertension - well controlled, blood pressure of 108/78 today. Known hypokalemia, recommend to restart oral potassium. No changes to hypertensive regimen.     BMI  Estimated body mass index is 32.39 kg/m  as calculated from the following:    Height as of this encounter: 1.829 m (6').    Weight as of this encounter: 108.3 kg (238 lb 12.8 oz).   Weight management plan: Discussed healthy diet and exercise guidelines    Follow-up  Return if symptoms worsen or fail to improve.    Subjective   Ang is a 74 year old, presenting for the following health issues:  Medication Problem (Started couple days ago, think it may be from Bactrim, itchy, rash on face, back, chest, RT Knee, was soaking with aloe. )        5/12/2025     9:19 AM   Additional Questions   Roomed by Sinai MORROW   Accompanied by Wife     History of Present Illness        Reason for visit:  Rash on face and skin  Symptoms include:  Blotchy face and neck  itchy  Symptom intensity:  Moderate  Symptom progression:  Staying the same  Had these symptoms before:  No  What makes it worse:  Laying down  What makes it better:  Not now   He is taking medications regularly.     Ang presents to the clinic today for medication reaction. He was triaged by the nursing team yesterday for a rash thought to be due to Bactrim. He reported red, blotchy skin and rash to face, neck, wrist and itching to his head. Declines SOB, blistering ulcers, difficulties breathing, etc. Does not appear he has previously been on sulfa antibiotics.     He was started on Bactrim on 5/3/25 in the ER for concerns of right knee prepatellar bursitis - noted redness and swelling which onset one-day prior to ER visit. ER attempted aspiration without success. ESR and CRP elevated, normal blood counts and renal function otherwise.     Ang was not specifically outside, but did spend time in the car in the sunlight and rash started around this timeframe.     He stopped Sulfa and his oral potassium last night. He took Claritin at 7pm last night with minimal improvement. He declines shortness of breath, difficulties breathing/swallowing, worsening rash, etc.     Hypertension Follow-up  Do you check your blood pressure regularly outside of the clinic? Yes   Are you following a low salt diet? Yes  Are your blood pressures ever more than 140 on the top number (systolic) OR more   than 90 on the bottom number (diastolic), for example 140/90? No    BP Readings from Last 2 Encounters:   05/12/25 108/78   05/05/25 120/64     Review of Systems  Constitutional, HEENT, cardiovascular, pulmonary, GI, , musculoskeletal, neuro, skin, endocrine and psych systems are negative, except as otherwise noted.    Objective    /78 (BP Location: Right arm, Patient Position: Sitting, Cuff Size: Adult Large)   Pulse 74   Temp 98  F (36.7  C)  (Temporal)   Resp 20   Ht 1.829 m (6')   Wt 108.3 kg (238 lb 12.8 oz)   SpO2 94%   BMI 32.39 kg/m    Body mass index is 32.39 kg/m .  Physical Exam   GENERAL: alert and no distress  EYES: Eyes grossly normal to inspection  HENT: patent airway  RESP: lungs clear to auscultation - no rales, rhonchi or wheezes  CV: regular rate and rhythm, normal S1 S2, no S3 or S4, no murmur, click or rub, no peripheral edema  MS: no gross musculoskeletal defects noted, no edema  SKIN: erythematous rash which appears similar to a photosensitivity rash to face (cheeks, forehead, etc.), chest, right knee.   PSYCH: mentation appears normal, affect normal/bright    No results found for any visits on 05/12/25.    Signed Electronically by: Birgit Hernandez PA-C

## 2025-05-12 NOTE — CONFIDENTIAL NOTE
Nurse Triage SBAR    Is this a 2nd Level Triage? NO    Situation: Last sat, got a med from [facility]    Bactrim - has a bad rash  Has 2 days left; med started on 5/3/25  Red blotchy skin and rash is on his face and neck, wrist, head itches  No shortness of breath, no blistering or ulcers in his mouth      Background: no previous allergy to this med or any antibiotic    Assessment: r/o allergic reaction to antibiotic    Protocol Recommended Disposition:   Caller was informed of care advice. Patient should be evaluated per RN protocol: be seen within 24 hours. Caller verbalized understanding.      Lakshmi Beckford RN, BSN  Triage Nurse Advisor

## 2025-05-16 ENCOUNTER — RESULTS FOLLOW-UP (OUTPATIENT)
Dept: CARDIOLOGY | Facility: OTHER | Age: 75
End: 2025-05-16

## 2025-05-21 ENCOUNTER — DOCUMENTATION ONLY (OUTPATIENT)
Dept: FAMILY MEDICINE | Facility: OTHER | Age: 75
End: 2025-05-21
Payer: COMMERCIAL

## 2025-05-21 DIAGNOSIS — G47.33 OBSTRUCTIVE SLEEP APNEA (ADULT) (PEDIATRIC): Primary | ICD-10-CM

## 2025-06-23 ENCOUNTER — OFFICE VISIT (OUTPATIENT)
Dept: FAMILY MEDICINE | Facility: OTHER | Age: 75
End: 2025-06-23
Attending: NURSE PRACTITIONER
Payer: COMMERCIAL

## 2025-06-23 VITALS
OXYGEN SATURATION: 93 % | TEMPERATURE: 97.2 F | BODY MASS INDEX: 33.09 KG/M2 | HEART RATE: 65 BPM | RESPIRATION RATE: 16 BRPM | DIASTOLIC BLOOD PRESSURE: 85 MMHG | WEIGHT: 244 LBS | SYSTOLIC BLOOD PRESSURE: 138 MMHG

## 2025-06-23 DIAGNOSIS — N39.43 BENIGN PROSTATIC HYPERPLASIA WITH POST-VOID DRIBBLING: Primary | ICD-10-CM

## 2025-06-23 DIAGNOSIS — N39.43 DRIBBLING OF URINE: ICD-10-CM

## 2025-06-23 DIAGNOSIS — N40.1 BENIGN PROSTATIC HYPERPLASIA WITH POST-VOID DRIBBLING: Primary | ICD-10-CM

## 2025-06-23 LAB
ALBUMIN UR-MCNC: NEGATIVE MG/DL
APPEARANCE UR: CLEAR
BILIRUB UR QL STRIP: NEGATIVE
COLOR UR AUTO: NORMAL
GLUCOSE UR STRIP-MCNC: NEGATIVE MG/DL
HGB UR QL STRIP: NEGATIVE
KETONES UR STRIP-MCNC: NEGATIVE MG/DL
LEUKOCYTE ESTERASE UR QL STRIP: NEGATIVE
NITRATE UR QL: NEGATIVE
PH UR STRIP: 8 [PH] (ref 5–9)
SP GR UR STRIP: 1.01 (ref 1–1.03)
UROBILINOGEN UR STRIP-MCNC: NORMAL MG/DL

## 2025-06-23 PROCEDURE — 81003 URINALYSIS AUTO W/O SCOPE: CPT | Mod: ZL

## 2025-06-23 PROCEDURE — 3079F DIAST BP 80-89 MM HG: CPT

## 2025-06-23 PROCEDURE — G0463 HOSPITAL OUTPT CLINIC VISIT: HCPCS

## 2025-06-23 PROCEDURE — 1126F AMNT PAIN NOTED NONE PRSNT: CPT

## 2025-06-23 PROCEDURE — 3075F SYST BP GE 130 - 139MM HG: CPT

## 2025-06-23 PROCEDURE — 99213 OFFICE O/P EST LOW 20 MIN: CPT

## 2025-06-23 ASSESSMENT — PAIN SCALES - GENERAL: PAINLEVEL_OUTOF10: NO PAIN (0)

## 2025-06-23 NOTE — PROGRESS NOTES
ASSESSMENT/PLAN:    I have reviewed the nursing notes.  I have reviewed the findings, diagnosis, plan and need for follow up with the patient.    1. Benign prostatic hyperplasia with post-void dribbling (Primary)  2. Dribbling of urine  - UA Macroscopic with reflex to Microscopic and Culture    Patient presents with urinary symptoms.  Vitals are stable.  Urinalysis was negative for any signs of infection or abnormalities.  Discussed results with patient in clinic.  He has been experiencing dribbling of urine.  Patient is currently on Flomax for BPH.  Discussed that his current symptoms are consistent with his BPH.  Advised patient to continue with the Flomax and pushing fluids.  Avoid caffeine.  Patient has a follow-up with his PCP next week.    Discussed warning signs/symptoms indicative of need to f/u    Follow up if symptoms persist or worsen or concerns    I explained my diagnostic considerations and recommendations to the patient, who voiced understanding and agreement with the treatment plan. All questions were answered. We discussed potential side effects of any prescribed or recommended therapies, as well as expectations for response to treatments.    YODIT Grewal CNP  6/23/2025  11:01 AM    HPI:    Jarad Purvis is a 75 year old male who presents to [facility] today for concerns of urinary symptoms    Genitourinary - Male  Onset:  this morning  Description:   Dysuria (painful): No  Hematuria (blood in urine): No  Frequency: No  Are you urinating at night : YES  Hesitancy (delay in urine): YES  Retention (unable to empty): No  Decrease in urinary flow: occasionally  Incontinence: YES- mild dribbling  Color of urine: yellow and clear  Accompanying Signs & Symptoms:   Fever: No  Back/Flank pain: No  Nausea and/or vomiting: No  Abdominal pain: YES- mild suprapubic discomfort  Urethral discharge: No  Testicle lumps/masses: No  History:    History of frequent UTI's: No  History of kidney stones:  No  History of hernias: No  Personal or Family history of Prostate problems: YES- on Flomax  Sexually active: No  Therapies tried:  Flomax       History reviewed. No pertinent past medical history.  Past Surgical History:   Procedure Laterality Date    CHOLECYSTECTOMY      No Comments Provided    COLONOSCOPY  2010    ESOPHAGOSCOPY, GASTROSCOPY, DUODENOSCOPY (EGD), COMBINED          EXTRACAPSULAR CATARACT EXTRATION WITH INTRAOCULAR LENS IMPLANT      rt    OTHER SURGICAL HISTORY      46046.1,[place] LAP VENTRAL INCISIONAL HERNIORRAPHY,with mesh    OTHER SURGICAL HISTORY      21888.0,[place] FUSION OF WRIST JOINT    OTHER SURGICAL HISTORY      803433,OTHER    OTHER SURGICAL HISTORY      830554,BONE MARROW ASPIRATION     Social History     Tobacco Use    Smoking status: Former     Current packs/day: 0.00     Types: Cigarettes     Quit date: 10/8/1985     Years since quittin.7    Smokeless tobacco: Former   Substance Use Topics    Alcohol use: Yes     Alcohol/week: 2.0 standard drinks of alcohol     Types: 2 Standard drinks or equivalent per week     Comment: occasional     Current Outpatient Medications   Medication Sig Dispense Refill    albuterol (PROAIR HFA/PROVENTIL HFA/VENTOLIN HFA) 108 (90 Base) MCG/ACT inhaler Inhale 2 puffs into the lungs every 6 hours as needed for shortness of breath, wheezing or cough. 18 g 5    fluticasone (FLONASE) 50 MCG/ACT nasal spray Spray 2 sprays into both nostrils daily 16 g 11    hydrochlorothiazide (HYDRODIURIL) 25 MG tablet Take 1 tablet (25 mg) by mouth daily. 90 tablet 4    metoprolol succinate ER (TOPROL XL) 25 MG 24 hr tablet Take 1 tablet (25 mg) by mouth daily. 90 tablet 3    Multiple Vitamins-Minerals (MULTI-VITAMIN/MINERALS) TABS Take 1 tablet by mouth daily      multivitamin  with lutein (OCUVITE WITH LUTEIN) CAPS per capsule Take 1 capsule by mouth daily      order for DME Equipment being ordered: CPAP and supplies 1 Units 0    potassium 99 MG TABS       Sharps  Container (SHARPS-A-GATOR LOCKING BRACKET) MISC CPAP, heated humidifier, mask, headgear, filters and tubing. For home use. Pressure: ?  Length of Need: lifetime      tamsulosin (FLOMAX) 0.4 MG capsule Take 1 capsule (0.4 mg) by mouth daily. 90 capsule 4    triamcinolone (ARISTOCORT HP) 0.5 % external cream Apply topically 2 times daily. 30 g 3     Allergies   Allergen Reactions    Phenobarbital Hives     Past medical history, past surgical history, current medications and allergies reviewed and accurate to the best of my knowledge.      ROS:  Refer to HPI    /85 (BP Location: Left arm, Patient Position: Sitting, Cuff Size: Adult Regular)   Pulse 65   Temp 97.2  F (36.2  C) (Tympanic)   Resp 16   Wt 110.7 kg (244 lb)   SpO2 93%   BMI 33.09 kg/m      EXAM:  General Appearance: Well appearing 75 year old male, appropriate appearance for age. No acute distress   Respiratory: normal chest wall and respirations.  Normal effort.  No increased work of breathing.  No cough appreciated.  Cardiac: RRR  Neuro: Alert and oriented to person, place, and time.    Psychological: normal affect, alert, oriented, and pleasant.     Labs:  Results for orders placed or performed in visit on 06/23/25   UA Macroscopic with reflex to Microscopic and Culture     Status: Normal    Specimen: Urine, Clean Catch   Result Value Ref Range    Color Urine Light Yellow Colorless, Straw, Light Yellow, Yellow    Appearance Urine Clear Clear    Glucose Urine Negative Negative mg/dL    Bilirubin Urine Negative Negative    Ketones Urine Negative Negative mg/dL    Specific Gravity Urine 1.008 1.000 - 1.030    Blood Urine Negative Negative    pH Urine 8.0 5.0 - 9.0    Protein Albumin Urine Negative Negative mg/dL    Urobilinogen Urine Normal Normal mg/dL    Nitrite Urine Negative Negative    Leukocyte Esterase Urine Negative Negative    Narrative    Microscopic not indicated

## 2025-06-23 NOTE — NURSING NOTE
Chief Complaint   Patient presents with    UTI       Initial /85 (BP Location: Left arm, Patient Position: Sitting, Cuff Size: Adult Regular)   Pulse 65   Temp 97.2  F (36.2  C) (Tympanic)   Resp 16   Wt 110.7 kg (244 lb)   SpO2 93%   BMI 33.09 kg/m   Estimated body mass index is 33.09 kg/m  as calculated from the following:    Height as of 5/12/25: 1.829 m (6').    Weight as of this encounter: 110.7 kg (244 lb).  Medication Review: complete    The next two questions are to help us understand your food security.  If you are feeling you need any assistance in this area, we have resources available to support you today.          9/26/2024   SDOH- Food Insecurity   Within the past 12 months, did you worry that your food would run out before you got money to buy more? N   Within the past 12 months, did the food you bought just not last and you didn t have money to get more? N        Data saved with a previous flowsheet row definition         Health Care Directive:  Patient has a Health Care Directive on file      Elaina Parry

## 2025-07-01 ENCOUNTER — OFFICE VISIT (OUTPATIENT)
Dept: FAMILY MEDICINE | Facility: OTHER | Age: 75
End: 2025-07-01
Attending: FAMILY MEDICINE
Payer: MEDICARE

## 2025-07-01 VITALS
BODY MASS INDEX: 33.36 KG/M2 | OXYGEN SATURATION: 94 % | TEMPERATURE: 97.4 F | DIASTOLIC BLOOD PRESSURE: 80 MMHG | WEIGHT: 246 LBS | HEART RATE: 92 BPM | RESPIRATION RATE: 20 BRPM | SYSTOLIC BLOOD PRESSURE: 132 MMHG

## 2025-07-01 DIAGNOSIS — N39.490 OVERFLOW INCONTINENCE OF URINE: ICD-10-CM

## 2025-07-01 DIAGNOSIS — Z12.5 ENCOUNTER FOR SCREENING FOR MALIGNANT NEOPLASM OF PROSTATE: ICD-10-CM

## 2025-07-01 PROBLEM — E66.01 MORBID OBESITY (H): Status: RESOLVED | Noted: 2022-08-29 | Resolved: 2025-07-01

## 2025-07-01 LAB — PSA SERPL DL<=0.01 NG/ML-MCNC: 0.39 NG/ML (ref 0–6.5)

## 2025-07-01 PROCEDURE — G0103 PSA SCREENING: HCPCS | Mod: ZL | Performed by: FAMILY MEDICINE

## 2025-07-01 PROCEDURE — G0463 HOSPITAL OUTPT CLINIC VISIT: HCPCS

## 2025-07-01 PROCEDURE — 36415 COLL VENOUS BLD VENIPUNCTURE: CPT | Mod: ZL | Performed by: FAMILY MEDICINE

## 2025-07-01 RX ORDER — TAMSULOSIN HYDROCHLORIDE 0.4 MG/1
0.4 CAPSULE ORAL 2 TIMES DAILY
Qty: 180 CAPSULE | Refills: 4 | Status: SHIPPED | OUTPATIENT
Start: 2025-07-01

## 2025-07-01 ASSESSMENT — PAIN SCALES - GENERAL: PAINLEVEL_OUTOF10: NO PAIN (0)

## 2025-07-01 NOTE — NURSING NOTE
Patient here for prostate issues that he was seen in the [facility] on 06/25/2025. Medication Reconciliation: complete.    Devi Vivar LPN  7/1/2025 1:22 PM

## 2025-07-01 NOTE — PROGRESS NOTES
SUBJECTIVE:   Jarad Purvis is a 75 year old male who presents to clinic today for the following health issues:    History of Present Illness       Reason for visit:  Followupfrom visit    He eats 2-3 servings of fruits and vegetables daily.He consumes 1 sweetened beverage(s) daily.He exercises with enough effort to increase his heart rate 10 to 19 minutes per day.  He exercises with enough effort to increase his heart rate 3 or less days per week.   He is taking medications regularly.    Patient arrives here to follow-up prostate issues.  Patient does periodically have slow flow.  Especially in the morning or after sitting for long periods of times a period such as driving.  Does not occur all the time but usually when he gets up in the morning.  The patient does have a history of urinary frequency.  And is on Flomax.    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14  no longer on medications      Sleep apnea 01/25/2018     Priority: Medium    Elevated blood sugar 09/03/2015     Priority: Medium    Hemorrhoids 01/16/2015     Priority: Medium    Incontinence 09/30/2013     Priority: Medium    H/O colonoscopy 10/08/2012     Priority: Medium     Overview:   2008      Corns and callosities 12/12/2011     Priority: Medium    Personal history of other mental disorder 06/03/2010     Priority: Medium       Review of Systems     OBJECTIVE:     /80   Pulse 92   Temp 97.4  F (36.3  C)   Resp 20   Wt 111.6 kg (246 lb)   SpO2 94%   BMI 33.36 kg/m    Body mass index is 33.36 kg/m .  Physical  Exam  Genitourinary:     Comments: I did have difficulty reaching the prostate and could only palpate a small portion of it.  Which did not feel abnormal  Neurological:      Mental Status: He is alert.         Diagnostic Test Results:  none     ASSESSMENT/PLAN:         (N39.490) Overflow incontinence of urine  Comment: Increase his Flomax to twice a day.  Plan: tamsulosin (FLOMAX) 0.4 MG capsule, PSA Screen         GH, CANCELED: UA reflex to Microscopic        Also check a PSA.  If the PSA is elevated proceed to urology consult.  Otherwise advised him that when his symptoms get to the point where he wants to see urology we can set him up also.    (Z12.5) Encounter for screening for malignant neoplasm of prostate  Comment:   Plan: PSA Screen [facility]          The longitudinal plan of care for the diagnosis(es)/condition(s) as documented were addressed during this visit. Due to the added complexity in care, I will continue to support Ang in the subsequent management and with ongoing continuity of care.      Neal Enriquez MD  [facility]

## 2025-07-07 ENCOUNTER — OFFICE VISIT (OUTPATIENT)
Dept: ORTHOPEDICS | Facility: OTHER | Age: 75
End: 2025-07-07
Attending: ORTHOPAEDIC SURGERY
Payer: MEDICARE

## 2025-07-07 DIAGNOSIS — M19.012 PRIMARY OSTEOARTHRITIS OF LEFT SHOULDER: Primary | ICD-10-CM

## 2025-07-07 PROCEDURE — 250N000011 HC RX IP 250 OP 636: Mod: JZ | Performed by: ORTHOPAEDIC SURGERY

## 2025-07-07 PROCEDURE — 250N000009 HC RX 250: Performed by: ORTHOPAEDIC SURGERY

## 2025-07-07 PROCEDURE — G0463 HOSPITAL OUTPT CLINIC VISIT: HCPCS

## 2025-07-07 RX ORDER — LIDOCAINE HYDROCHLORIDE 10 MG/ML
4 INJECTION, SOLUTION INFILTRATION; PERINEURAL ONCE
Status: COMPLETED | OUTPATIENT
Start: 2025-07-07 | End: 2025-07-07

## 2025-07-07 RX ORDER — TRIAMCINOLONE ACETONIDE 40 MG/ML
40 INJECTION, SUSPENSION INTRA-ARTICULAR; INTRAMUSCULAR ONCE
Status: COMPLETED | OUTPATIENT
Start: 2025-07-07 | End: 2025-07-07

## 2025-07-07 RX ADMIN — LIDOCAINE HYDROCHLORIDE 4 ML: 10 INJECTION, SOLUTION INFILTRATION; PERINEURAL at 14:54

## 2025-07-07 RX ADMIN — TRIAMCINOLONE ACETONIDE 40 MG: 40 INJECTION, SUSPENSION INTRA-ARTICULAR; INTRAMUSCULAR at 14:54

## 2025-07-07 NOTE — PROGRESS NOTES
Subjective:    75-year-old gentleman with severe left shoulder glenohumeral arthritis.  Last injection was on 3/31/2025.  He is interested in another injection today.  Is unsure of exactly how long the last 1 worked for but he had fairly reliable relief through most of the last 3 months.    Objective:    On examination today he has very poor range of motion of his left shoulder.  He has about 90 degrees of forward flexion and external rotation only to about neutral.  He is neurovascularly intact otherwise    Assessment:    75-year-old gentleman with left glenohumeral arthritis    Plan:    We gave him another injection into the left glenohumeral joint today.  He tolerated well good relief of his pain.  Will see him back on as-needed basis.    A steroid injection was performed at left glenohumeral joint using 1% plain Lidocaine and 40 mg of Kenalog. This was well tolerated.

## 2025-07-13 ENCOUNTER — OFFICE VISIT (OUTPATIENT)
Dept: FAMILY MEDICINE | Facility: OTHER | Age: 75
End: 2025-07-13
Payer: COMMERCIAL

## 2025-07-13 VITALS
WEIGHT: 243.2 LBS | RESPIRATION RATE: 18 BRPM | OXYGEN SATURATION: 96 % | DIASTOLIC BLOOD PRESSURE: 84 MMHG | HEART RATE: 61 BPM | SYSTOLIC BLOOD PRESSURE: 138 MMHG | BODY MASS INDEX: 32.98 KG/M2 | TEMPERATURE: 97.8 F

## 2025-07-13 DIAGNOSIS — L03.113 CELLULITIS OF RIGHT UPPER EXTREMITY: Primary | ICD-10-CM

## 2025-07-13 PROCEDURE — 3075F SYST BP GE 130 - 139MM HG: CPT | Performed by: FAMILY MEDICINE

## 2025-07-13 PROCEDURE — G0463 HOSPITAL OUTPT CLINIC VISIT: HCPCS

## 2025-07-13 PROCEDURE — 99213 OFFICE O/P EST LOW 20 MIN: CPT | Performed by: FAMILY MEDICINE

## 2025-07-13 PROCEDURE — 1125F AMNT PAIN NOTED PAIN PRSNT: CPT | Performed by: FAMILY MEDICINE

## 2025-07-13 PROCEDURE — 3079F DIAST BP 80-89 MM HG: CPT | Performed by: FAMILY MEDICINE

## 2025-07-13 RX ORDER — CEPHALEXIN 500 MG/1
500 CAPSULE ORAL 3 TIMES DAILY
Qty: 30 CAPSULE | Refills: 0 | Status: SHIPPED | OUTPATIENT
Start: 2025-07-13 | End: 2025-07-23

## 2025-07-13 ASSESSMENT — PAIN SCALES - GENERAL: PAINLEVEL_OUTOF10: MILD PAIN (2)

## 2025-07-13 NOTE — NURSING NOTE
Chief Complaint   Patient presents with    Swelling     Right elbow pain, swelling, and redness since last night.        Initial /84 (BP Location: Left arm, Patient Position: Sitting, Cuff Size: Adult Regular)   Pulse 61   Temp 97.8  F (36.6  C) (Tympanic)   Resp 18   Wt 110.3 kg (243 lb 3.2 oz)   SpO2 96%   BMI 32.98 kg/m   Estimated body mass index is 32.98 kg/m  as calculated from the following:    Height as of 5/12/25: 1.829 m (6').    Weight as of this encounter: 110.3 kg (243 lb 3.2 oz).  Medication Reconciliation: complete    Rosamaria Beauchamp LPN    Advance Care Directive reviewed

## 2025-07-13 NOTE — PROGRESS NOTES
SUBJECTIVE:   Jarad Purvis is a 75 year old male who presents to clinic today for the following health issues:    HPI  Patient arrives here for right elbow swelling.  Started last night.  With redness and soreness.  He was at a concert.    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   Past Surgical History:   Procedure Laterality Date    CHOLECYSTECTOMY      No Comments Provided    COLONOSCOPY  06/01/2010    ESOPHAGOSCOPY, GASTROSCOPY, DUODENOSCOPY (EGD), COMBINED      1998    EXTRACAPSULAR CATARACT EXTRATION WITH INTRAOCULAR LENS IMPLANT      rt    OTHER SURGICAL HISTORY      11642.1,[place] LAP VENTRAL INCISIONAL HERNIORRAPHY,with mesh    OTHER SURGICAL HISTORY      15597.0,[place] FUSION OF WRIST JOINT    OTHER SURGICAL HISTORY      [id number],OTHER    OTHER SURGICAL HISTORY      [id number],BONE MARROW ASPIRATION       Review of Systems     OBJECTIVE:     /84 (BP Location: Left arm, Patient Position: Sitting, Cuff Size: Adult Regular)   Pulse 61   Temp 97.8  F (36.6  C) (Tympanic)   Resp 18   Wt 110.3 kg (243 lb 3.2 oz)   SpO2 96%    BMI 32.98 kg/m    Body mass index is 32.98 kg/m .  Physical Exam  Constitutional:       Appearance: Normal appearance.   Musculoskeletal:         General: Normal range of motion.      Comments: There is swelling around the right elbow.  Extends about 10 cm x 10 cm.  Olecranon bursa is tender.  Swollen.   Neurological:      Mental Status: He is alert.             ASSESSMENT/PLAN:         (L03.113) Cellulitis of right upper extremity  (primary encounter diagnosis) versus olecranon bursitis.  Will treat with Keflex.  Patient has allergies to Bactrim.  If worsening follow-up.  Comment:   Plan: cephALEXin (KEFLEX) 500 MG capsule              Neal Enriquez MD  [facility]

## 2025-08-13 ENCOUNTER — OFFICE VISIT (OUTPATIENT)
Dept: FAMILY MEDICINE | Facility: OTHER | Age: 75
End: 2025-08-13
Attending: FAMILY MEDICINE
Payer: MEDICARE

## 2025-08-13 VITALS
SYSTOLIC BLOOD PRESSURE: 138 MMHG | RESPIRATION RATE: 20 BRPM | DIASTOLIC BLOOD PRESSURE: 80 MMHG | OXYGEN SATURATION: 95 % | BODY MASS INDEX: 32.82 KG/M2 | WEIGHT: 242 LBS | TEMPERATURE: 97.9 F | HEART RATE: 86 BPM

## 2025-08-13 DIAGNOSIS — R35.0 URINE FREQUENCY: Primary | ICD-10-CM

## 2025-08-13 LAB
ALBUMIN UR-MCNC: NEGATIVE MG/DL
APPEARANCE UR: CLEAR
BILIRUB UR QL STRIP: NEGATIVE
COLOR UR AUTO: NORMAL
GLUCOSE UR STRIP-MCNC: NEGATIVE MG/DL
HGB UR QL STRIP: NEGATIVE
KETONES UR STRIP-MCNC: NEGATIVE MG/DL
LEUKOCYTE ESTERASE UR QL STRIP: NEGATIVE
NITRATE UR QL: NEGATIVE
PH UR STRIP: 7.5 [PH] (ref 5–9)
SP GR UR STRIP: 1.01 (ref 1–1.03)
UROBILINOGEN UR STRIP-MCNC: NORMAL MG/DL

## 2025-08-13 PROCEDURE — 81003 URINALYSIS AUTO W/O SCOPE: CPT | Mod: ZL | Performed by: FAMILY MEDICINE

## 2025-08-13 PROCEDURE — G0463 HOSPITAL OUTPT CLINIC VISIT: HCPCS

## 2025-08-13 ASSESSMENT — PAIN SCALES - GENERAL: PAINLEVEL_OUTOF10: NO PAIN (0)

## (undated) RX ORDER — TRIAMCINOLONE ACETONIDE 40 MG/ML
INJECTION, SUSPENSION INTRA-ARTICULAR; INTRAMUSCULAR
Status: DISPENSED
Start: 2025-07-07

## (undated) RX ORDER — IBUPROFEN 200 MG
TABLET ORAL
Status: DISPENSED
Start: 2025-03-26

## (undated) RX ORDER — LIDOCAINE HYDROCHLORIDE 10 MG/ML
INJECTION, SOLUTION INFILTRATION; PERINEURAL
Status: DISPENSED
Start: 2025-07-07

## (undated) RX ORDER — LIDOCAINE HYDROCHLORIDE 10 MG/ML
INJECTION, SOLUTION EPIDURAL; INFILTRATION; INTRACAUDAL; PERINEURAL
Status: DISPENSED
Start: 2025-03-31

## (undated) RX ORDER — LIDOCAINE HYDROCHLORIDE 10 MG/ML
INJECTION, SOLUTION EPIDURAL; INFILTRATION; INTRACAUDAL; PERINEURAL
Status: DISPENSED
Start: 2025-05-03

## (undated) RX ORDER — SULFAMETHOXAZOLE AND TRIMETHOPRIM 800; 160 MG/1; MG/1
TABLET ORAL
Status: DISPENSED
Start: 2025-05-03

## (undated) RX ORDER — IBUPROFEN 200 MG
TABLET ORAL
Status: DISPENSED
Start: 2021-06-13

## (undated) RX ORDER — TRIAMCINOLONE ACETONIDE 40 MG/ML
INJECTION, SUSPENSION INTRA-ARTICULAR; INTRAMUSCULAR
Status: DISPENSED
Start: 2025-03-31

## (undated) RX ORDER — IBUPROFEN 400 MG/1
TABLET, FILM COATED ORAL
Status: DISPENSED
Start: 2021-06-13

## (undated) RX ORDER — DOXYCYCLINE 100 MG/1
CAPSULE ORAL
Status: DISPENSED
Start: 2021-06-13

## (undated) RX ORDER — ACETAMINOPHEN 325 MG/1
TABLET ORAL
Status: DISPENSED
Start: 2025-05-03

## (undated) RX ORDER — DEXAMETHASONE SODIUM PHOSPHATE 4 MG/ML
INJECTION, SOLUTION INTRA-ARTICULAR; INTRALESIONAL; INTRAMUSCULAR; INTRAVENOUS; SOFT TISSUE
Status: DISPENSED
Start: 2022-10-16

## (undated) RX ORDER — KETOROLAC TROMETHAMINE 15 MG/ML
INJECTION, SOLUTION INTRAMUSCULAR; INTRAVENOUS
Status: DISPENSED
Start: 2022-09-03